# Patient Record
Sex: MALE | Race: BLACK OR AFRICAN AMERICAN | Employment: UNEMPLOYED | URBAN - METROPOLITAN AREA
[De-identification: names, ages, dates, MRNs, and addresses within clinical notes are randomized per-mention and may not be internally consistent; named-entity substitution may affect disease eponyms.]

---

## 2017-04-18 ENCOUNTER — OFFICE VISIT (OUTPATIENT)
Dept: FAMILY MEDICINE CLINIC | Age: 62
End: 2017-04-18

## 2017-04-18 VITALS
HEART RATE: 89 BPM | TEMPERATURE: 97.7 F | DIASTOLIC BLOOD PRESSURE: 84 MMHG | WEIGHT: 268 LBS | BODY MASS INDEX: 39.69 KG/M2 | RESPIRATION RATE: 18 BRPM | HEIGHT: 69 IN | SYSTOLIC BLOOD PRESSURE: 138 MMHG | OXYGEN SATURATION: 98 %

## 2017-04-18 DIAGNOSIS — R93.89 ABNORMAL CHEST X-RAY: Primary | ICD-10-CM

## 2017-04-18 DIAGNOSIS — K21.9 GASTROESOPHAGEAL REFLUX DISEASE WITHOUT ESOPHAGITIS: ICD-10-CM

## 2017-04-18 DIAGNOSIS — I10 ESSENTIAL HYPERTENSION: ICD-10-CM

## 2017-04-18 NOTE — MR AVS SNAPSHOT
Visit Information Date & Time Provider Department Dept. Phone Encounter #  
 4/18/2017  9:30 AM Kun Carr, [address] [facility] [id number] Follow-up Instructions Return if symptoms worsen or fail to improve. Upcoming Health Maintenance Date Due Hepatitis C Screening 1955 DTaP/Tdap/Td series (1 - Tdap) 5/24/1976 FOBT Q 1 YEAR AGE 50-75 5/24/2005 ZOSTER VACCINE AGE 60> 5/24/2015 INFLUENZA AGE 9 TO ADULT 8/1/2016 Allergies as of 4/18/2017  Review Complete On: 4/18/2017 By: Kun Carr MD  
 No Known Allergies Current Immunizations  Never Reviewed No immunizations on file. Not reviewed this visit You Were Diagnosed With   
  
 Codes Comments Abnormal chest x-ray    -  Primary ICD-10-CM: R93.8 ICD-9-CM: 793.2 Essential hypertension     ICD-10-CM: I10 
ICD-9-CM: 401.9 Gastroesophageal reflux disease without esophagitis     ICD-10-CM: K21.9 ICD-9-CM: 530.81 Vitals BP Pulse Temp Resp Height(growth percentile) Weight(growth percentile) 138/84 89 97.7 °F (36.5 °C) (Temporal) 18 5' 9\" (1.753 m) 268 lb (121.6 kg) SpO2 BMI Smoking Status 98% 39.58 kg/m2 Never Smoker Vitals History BMI and BSA Data Body Mass Index Body Surface Area  
 39.58 kg/m 2 2.43 m 2 Preferred Pharmacy Pharmacy Name Phone [facility], [address] [facility] [phone] Your Updated Medication List  
  
   
This list is accurate as of: 4/18/17 10:23 AM.  Always use your most recent med list. amLODIPine 10 mg tablet Commonly known as:  Rice Jus Take  by mouth daily. amoxicillin 500 mg Tab Take  by mouth. celecoxib 200 mg capsule Commonly known as:  CELEBREX Take 1 Cap by mouth two (2) times a day. cetirizine 10 mg tablet Commonly known as:  ZYRTEC Take  by mouth. CINNAMON 500 mg Cap Generic drug:  cinnamon bark Take  by mouth. DULoxetine 30 mg capsule Commonly known as:  CYMBALTA Take 30 mg by mouth daily. finasteride 5 mg tablet Commonly known as:  PROSCAR Take 1 Tab by mouth daily. FISH OIL PO Take  by mouth.  
  
 gabapentin 300 mg capsule Commonly known as:  NEURONTIN Take 300 mg by mouth three (3) times daily. meclizine 25 mg tablet Commonly known as:  ANTIVERT Take 1 Tab by mouth daily. Omeprazole delayed release 20 mg tablet Commonly known as:  PRILOSEC D/R Take 1 Tab by mouth daily. ONE-A-DAY MEN VITACRAVES 200 mcg Chew Generic drug:  multivit with min-folic acid Take  by mouth. raNITIdine 150 mg tablet Commonly known as:  ZANTAC Take 150 mg by mouth two (2) times a day. sertraline 100 mg tablet Commonly known as:  ZOLOFT Take  by mouth daily. sildenafil citrate 100 mg tablet Commonly known as:  VIAGRA Take 1 Tab by mouth as needed. Indications: Erectile Dysfunction  
  
 tamsulosin 0.4 mg capsule Commonly known as:  FLOMAX Take 0.4 mg by mouth daily. telmisartan-hydroCHLOROthiazide 80-12.5 mg per tablet Commonly known as:  MICARDIS HCT Take 1 Tab by mouth daily. traZODone 150 mg tablet Commonly known as:  Jerelene French Take 150 mg by mouth nightly. Follow-up Instructions Return if symptoms worsen or fail to improve. To-Do List   
 04/18/2017 Imaging:  XR CHEST PA LAT Patient Instructions Chest X-Rays: About These Tests What is it? A chest X-ray is a picture of the chest that shows your heart, lungs, airway, blood vessels, and lymph nodes. Chest X-rays can also show the bones of your spine and chest. 
Why is this test done? A chest X-ray is done to find problems with the organs and structures inside the chest. 
How can you prepare for the test? 
· Tell your doctor if you are or might be pregnant.  A chest X-ray is usually not done during pregnancy, but the chance of harm to the fetus is very small. If you need a chest X-ray during pregnancy, you will wear a lead apron to help protect your baby. What happens before the test? 
· Remove any jewelry that might get in the way of the X-ray picture. · You may need to take off all or most of your clothes above the waist. You will be given a gown to wear during the test. 
What happens during the test? 
Two X-ray views of the chest are usually taken. One view is taken from the back. The other view is taken from the side. · You stand with your chest against an X-ray plate for the pictures. · You will need to hold very still while the X-ray is taken. You may be asked to hold your breath for a few seconds. What else should you know about the test? 
· You won't feel any pain from the chest X-ray itself. · If you have pain from a chest problem, you may feel some discomfort if you need to hold a certain position, breathe deep, or hold your breath while the X-ray is done. How long does the test take? · The test will take about 10 minutes. What happens after the test? 
· You will probably be able to go home right away. The results of a chest X-ray are usually available in 1 to 2 days. · You can go back to your usual activities right away. Follow-up care is a key part of your treatment and safety. Be sure to make and go to all appointments, and call your doctor if you are having problems. It's also a good idea to keep a list of the medicines you take. Ask your doctor when you can expect to have your test results. Where can you learn more? Go to http://neel-stone.info/. Enter N777 in the search box to learn more about \"Chest X-Rays: About These Tests. \" Current as of: October 14, 2016 Content Version: 11.2 © 4496-1466 [employer].  Care instructions adapted under license by [employer] (which disclaims liability or warranty for this information). If you have questions about a medical condition or this instruction, always ask your healthcare professional. [address] any warranty or liability for your use of this information. Introducing [facility] SERVICES! [employer] introduces [employer] patient portal. Now you can access parts of your medical record, email your doctor's office, and request medication refills online. 1. In your internet browser, go to [email or web address] 2. Click on the First Time User? Click Here link in the Sign In box. You will see the New Member Sign Up page. 3. Enter your [employer] Access Code exactly as it appears below. You will not need to use this code after youve completed the sign-up process. If you do not sign up before the expiration date, you must request a new code. · Applix Access Code: [email or web address] Expires: 7/17/2017 10:16 AM 
 
4. Enter the last four digits of your Social Security Number (xxxx) and Date of Birth (mm/dd/yyyy) as indicated and click Submit. You will be taken to the next sign-up page. 5. Create a Applix ID. This will be your Applix login ID and cannot be changed, so think of one that is secure and easy to remember. 6. Create a [employer]ix password. You can change your password at any time. 7. Enter your Password Reset Question and Answer. This can be used at a later time if you forget your password. 8. Enter your e-mail address. You will receive e-mail notification when new information is available in [address] 9. Click Sign Up. You can now view and download portions of your medical record. 10. Click the Download Summary menu link to download a portable copy of your medical information. If you have questions, please visit the Frequently Asked Questions section of the [employer] website. Remember, Applix is NOT to be used for urgent needs. For medical emergencies, dial 911. Now available from your iPhone and Android! Please provide this summary of care documentation to your next provider. Your primary care clinician is listed as [id number] [facility]. If you have any questions after today's visit, please call [phone].

## 2017-04-18 NOTE — PROGRESS NOTES
Jake Melendez is a 64 y.o. male  presents for follow up. He was seen at [facility] in [place] and was told that his chest xray was abnormal and it should be repeated. No cough or cold sxs. No SOB fever or chills. No Known Allergies  Outpatient Prescriptions Marked as Taking for the 4/18/17 encounter (Office Visit) with Claire Swenson MD   Medication Sig Dispense Refill    gabapentin (NEURONTIN) 300 mg capsule Take 300 mg by mouth three (3) times daily.  amoxicillin 500 mg tab Take  by mouth.  cinnamon bark (CINNAMON) 500 mg cap Take  by mouth.  tamsulosin (FLOMAX) 0.4 mg capsule Take 0.4 mg by mouth daily.  traZODone (DESYREL) 150 mg tablet Take 150 mg by mouth nightly.  multivit with min-folic acid (ONE-A-DAY MEN VITACRAVES) 200 mcg chew Take  by mouth.  telmisartan-hydroCHLOROthiazide (MICARDIS HCT) 80-12.5 mg per tablet Take 1 Tab by mouth daily. 90 Tab 1    celecoxib (CELEBREX) 200 mg capsule Take 1 Cap by mouth two (2) times a day. 60 Cap 3    finasteride (PROSCAR) 5 mg tablet Take 1 Tab by mouth daily. 90 Tab 1    meclizine (ANTIVERT) 25 mg tablet Take 1 Tab by mouth daily. 90 Tab 1    Omeprazole delayed release (PRILOSEC D/R) 20 mg tablet Take 1 Tab by mouth daily. 90 Tab 1    sildenafil citrate (VIAGRA) 100 mg tablet Take 1 Tab by mouth as needed.  Indications: Erectile Dysfunction 24 Tab 1     Patient Active Problem List   Diagnosis Code    Arthritis M19.90    Essential hypertension I10    Gastroesophageal reflux disease without esophagitis K21.9    PTSD (post-traumatic stress disorder) F43.10    Benign prostatic hyperplasia without lower urinary tract symptoms N40.0    Vertigo R42     Past Medical History:   Diagnosis Date    Arthritis     Headache     Hypercholesterolemia     Hypertension     PTSD (post-traumatic stress disorder)     TBI (traumatic brain injury) ([facility] [address]      Social History     Social History    Marital status:      Spouse name: N/A  Number of children: N/A    Years of education: N/A     Social History Main Topics    Smoking status: Never Smoker    Smokeless tobacco: None    Alcohol use No    Drug use: None    Sexual activity: Not Asked     Other Topics Concern    None     Social History Narrative     No family history on file. Review of Systems   Constitutional: Negative for chills and fever. Respiratory: Negative for cough, shortness of breath and wheezing. Cardiovascular: Negative for chest pain and palpitations. Gastrointestinal: Negative for nausea and vomiting. Neurological: Negative. Vitals:    04/18/17 0954   BP: 138/84   Pulse: 89   Resp: 18   Temp: 97.7 °F (36.5 °C)   TempSrc: Temporal   SpO2: 98%   Weight: 268 lb (121.6 kg)   Height: 5' 9\" (1.753 m)   PainSc:   4   PainLoc: Ankle       Physical Exam   Constitutional: He is well-developed, well-nourished, and in no distress. Neck: Normal range of motion. Neck supple. Cardiovascular: Normal rate, regular rhythm and normal heart sounds. Pulmonary/Chest: Effort normal and breath sounds normal.   Skin: Skin is warm and dry. Nursing note and vitals reviewed. Assessment/Plan      ICD-10-CM ICD-9-CM    1. Abnormal chest x-ray R93.8 793.2 XR CHEST PA LAT   2. Essential hypertension I10 401.9    3. Gastroesophageal reflux disease without esophagitis K21.9 530.81      I have discussed the diagnosis with the patient and the intended plan of care as seen in the above orders. The patient has received an after-visit summary and questions were answered concerning future plans. I have discussed medication, side effects, and warnings with the patient in detail. The patient verbalized understanding and is in agreement with the plan of care. The patient will contact the office with any additional concerns.       Follow-up Disposition:  Return if symptoms worsen or fail to improve.  lab results and schedule of future lab studies reviewed with patient      Carson FORD Cammy Jacome MD

## 2017-04-18 NOTE — PROGRESS NOTES
Patient in the office for chest xray. Exam done and images sent to [facility].

## 2017-04-18 NOTE — PROGRESS NOTES
Pt in office to discuss xrays. 1. Have you been to the ER, urgent care clinic since your last visit? Hospitalized since your last visit? No     2. Have you seen or consulted any other health care providers outside of the [address] [place] [address] since your last visit? Include any pap smears or colon screening.  No

## 2017-04-18 NOTE — PATIENT INSTRUCTIONS
Chest X-Rays: About These Tests  What is it? A chest X-ray is a picture of the chest that shows your heart, lungs, airway, blood vessels, and lymph nodes. Chest X-rays can also show the bones of your spine and chest.  Why is this test done? A chest X-ray is done to find problems with the organs and structures inside the chest.  How can you prepare for the test?  · Tell your doctor if you are or might be pregnant. A chest X-ray is usually not done during pregnancy, but the chance of harm to the fetus is very small. If you need a chest X-ray during pregnancy, you will wear a lead apron to help protect your baby. What happens before the test?  · Remove any jewelry that might get in the way of the X-ray picture. · You may need to take off all or most of your clothes above the waist. You will be given a gown to wear during the test.  What happens during the test?  Two X-ray views of the chest are usually taken. One view is taken from the back. The other view is taken from the side. · You stand with your chest against an X-ray plate for the pictures. · You will need to hold very still while the X-ray is taken. You may be asked to hold your breath for a few seconds. What else should you know about the test?  · You won't feel any pain from the chest X-ray itself. · If you have pain from a chest problem, you may feel some discomfort if you need to hold a certain position, breathe deep, or hold your breath while the X-ray is done. How long does the test take? · The test will take about 10 minutes. What happens after the test?  · You will probably be able to go home right away. The results of a chest X-ray are usually available in 1 to 2 days. · You can go back to your usual activities right away. Follow-up care is a key part of your treatment and safety. Be sure to make and go to all appointments, and call your doctor if you are having problems. It's also a good idea to keep a list of the medicines you take. Ask your doctor when you can expect to have your test results. Where can you learn more? Go to http://neel-stone.info/. Enter I868 in the search box to learn more about \"Chest X-Rays: About These Tests. \"  Current as of: October 14, 2016  Content Version: 11.2  © 3630-0133 [employer]. Care instructions adapted under license by [employer] (which disclaims liability or warranty for this information). If you have questions about a medical condition or this instruction, always ask your healthcare professional. [address] any warranty or liability for your use of this information.

## 2017-06-20 ENCOUNTER — PATIENT OUTREACH (OUTPATIENT)
Dept: FAMILY MEDICINE CLINIC | Age: 62
End: 2017-06-20

## 2017-06-29 ENCOUNTER — OFFICE VISIT (OUTPATIENT)
Dept: FAMILY MEDICINE CLINIC | Age: 62
End: 2017-06-29

## 2017-06-29 ENCOUNTER — HOSPITAL ENCOUNTER (OUTPATIENT)
Dept: LAB | Age: 62
Discharge: HOME | End: 2017-06-29
Payer: MEDICARE

## 2017-06-29 VITALS
TEMPERATURE: 97.8 F | RESPIRATION RATE: 12 BRPM | OXYGEN SATURATION: 97 % | HEIGHT: 69 IN | HEART RATE: 79 BPM | SYSTOLIC BLOOD PRESSURE: 144 MMHG | DIASTOLIC BLOOD PRESSURE: 82 MMHG | WEIGHT: 271 LBS | BODY MASS INDEX: 40.14 KG/M2

## 2017-06-29 DIAGNOSIS — M54.50 CHRONIC BILATERAL LOW BACK PAIN WITHOUT SCIATICA: Primary | ICD-10-CM

## 2017-06-29 DIAGNOSIS — M19.90 ARTHRITIS: ICD-10-CM

## 2017-06-29 DIAGNOSIS — I10 ESSENTIAL HYPERTENSION: ICD-10-CM

## 2017-06-29 DIAGNOSIS — Z11.59 NEED FOR HEPATITIS C SCREENING TEST: ICD-10-CM

## 2017-06-29 DIAGNOSIS — G89.29 CHRONIC BILATERAL LOW BACK PAIN WITHOUT SCIATICA: Primary | ICD-10-CM

## 2017-06-29 LAB
ALBUMIN SERPL BCP-MCNC: 3.7 G/DL (ref 3.4–5)
ALBUMIN/GLOB SERPL: 1 {RATIO} (ref 0.8–1.7)
ALP SERPL-CCNC: 126 U/L (ref 45–117)
ALT SERPL-CCNC: 31 U/L (ref 16–61)
ANION GAP BLD CALC-SCNC: 8 MMOL/L (ref 3–18)
AST SERPL W P-5'-P-CCNC: 26 U/L (ref 15–37)
BILIRUB SERPL-MCNC: 0.4 MG/DL (ref 0.2–1)
BUN SERPL-MCNC: 26 MG/DL (ref 7–18)
BUN/CREAT SERPL: 19 (ref 12–20)
CALCIUM SERPL-MCNC: 9.4 MG/DL (ref 8.5–10.1)
CHLORIDE SERPL-SCNC: 106 MMOL/L (ref 100–108)
CHOLEST SERPL-MCNC: 226 MG/DL
CO2 SERPL-SCNC: 28 MMOL/L (ref 21–32)
CREAT SERPL-MCNC: 1.37 MG/DL (ref 0.6–1.3)
GLOBULIN SER CALC-MCNC: 3.6 G/DL (ref 2–4)
GLUCOSE SERPL-MCNC: 83 MG/DL (ref 74–99)
HDLC SERPL-MCNC: 51 MG/DL (ref 40–60)
HDLC SERPL: 4.4 {RATIO} (ref 0–5)
LDLC SERPL CALC-MCNC: 147.8 MG/DL (ref 0–100)
LIPID PROFILE,FLP: ABNORMAL
POTASSIUM SERPL-SCNC: 4.2 MMOL/L (ref 3.5–5.5)
PROT SERPL-MCNC: 7.3 G/DL (ref 6.4–8.2)
SODIUM SERPL-SCNC: 142 MMOL/L (ref 136–145)
TRIGL SERPL-MCNC: 136 MG/DL (ref ?–150)
VLDLC SERPL CALC-MCNC: 27.2 MG/DL

## 2017-06-29 PROCEDURE — 80061 LIPID PANEL: CPT | Performed by: FAMILY MEDICINE

## 2017-06-29 PROCEDURE — 86803 HEPATITIS C AB TEST: CPT | Performed by: FAMILY MEDICINE

## 2017-06-29 PROCEDURE — 80053 COMPREHEN METABOLIC PANEL: CPT | Performed by: FAMILY MEDICINE

## 2017-06-29 RX ORDER — AMLODIPINE BESYLATE 10 MG/1
10 TABLET ORAL DAILY
Qty: 90 TAB | Refills: 3 | Status: SHIPPED | OUTPATIENT
Start: 2017-06-29

## 2017-06-29 RX ORDER — CELECOXIB 200 MG/1
200 CAPSULE ORAL 2 TIMES DAILY
Qty: 180 CAP | Refills: 0 | Status: SHIPPED | OUTPATIENT
Start: 2017-06-29 | End: 2018-07-09 | Stop reason: SDUPTHER

## 2017-06-29 RX ORDER — DOXYCYCLINE 100 MG/1
100 CAPSULE ORAL DAILY
COMMUNITY

## 2017-06-29 RX ORDER — ONABOTULINUMTOXINA 200 [USP'U]/1
INJECTION, POWDER, LYOPHILIZED, FOR SOLUTION INTRADERMAL; INTRAMUSCULAR
COMMUNITY
Start: 2017-06-06 | End: 2018-10-16

## 2017-06-29 NOTE — MR AVS SNAPSHOT
Visit Information Date & Time Provider Department Dept. Phone Encounter #  
 6/29/2017  8:30 AM Brady Lott MD [address] [id number] Follow-up Instructions Return in about 3 months (around 9/29/2017). Upcoming Health Maintenance Date Due Hepatitis C Screening 1955 FOBT Q 1 YEAR AGE 50-75 5/24/2005 ZOSTER VACCINE AGE 60> 5/24/2015 INFLUENZA AGE 9 TO ADULT 8/1/2017 DTaP/Tdap/Td series (2 - Td) 6/29/2027 Allergies as of 6/29/2017  Review Complete On: 6/29/2017 By: Brady Lott MD  
 No Known Allergies Current Immunizations  Never Reviewed No immunizations on file. Not reviewed this visit You Were Diagnosed With   
  
 Codes Comments Chronic bilateral low back pain without sciatica    -  Primary ICD-10-CM: M54.5, G89.29 ICD-9-CM: 724.2, 338.29 Need for hepatitis C screening test     ICD-10-CM: Z11.59 
ICD-9-CM: V73.89 Arthritis     ICD-10-CM: M19.90 ICD-9-CM: 716.90 Essential hypertension     ICD-10-CM: I10 
ICD-9-CM: 401.9 Vitals BP Pulse Temp Resp Height(growth percentile) Weight(growth percentile) 144/82 (BP 1 Location: Left arm, BP Patient Position: Sitting) 79 97.8 °F (36.6 °C) (Oral) 12 5' 9\" (1.753 m) 271 lb (122.9 kg) SpO2 BMI Smoking Status 97% 40.02 kg/m2 Never Smoker Vitals History BMI and BSA Data Body Mass Index Body Surface Area 40.02 kg/m 2 2.45 m 2 Preferred Pharmacy Pharmacy Name Phone [facility], [address] [facility] [phone] Your Updated Medication List  
  
   
This list is accurate as of: 6/29/17  9:05 AM.  Always use your most recent med list. amLODIPine 10 mg tablet Commonly known as:  Opal Heymann Take 1 Tab by mouth daily. amoxicillin 500 mg Tab Take  by mouth. BOTOX 200 unit injection Generic drug:  onabotulinumtoxinA  
  
 celecoxib 200 mg capsule Commonly known as:  CELEBREX Take 1 Cap by mouth two (2) times a day. cetirizine 10 mg tablet Commonly known as:  ZYRTEC Take  by mouth. CINNAMON 500 mg Cap Generic drug:  cinnamon bark Take  by mouth. doxycycline 100 mg capsule Commonly known as:  Bean Apa Take 100 mg by mouth daily. DULoxetine 30 mg capsule Commonly known as:  CYMBALTA Take 30 mg by mouth daily. finasteride 5 mg tablet Commonly known as:  PROSCAR Take 1 Tab by mouth daily. FISH OIL PO Take  by mouth.  
  
 gabapentin 300 mg capsule Commonly known as:  NEURONTIN Take 300 mg by mouth three (3) times daily. meclizine 25 mg tablet Commonly known as:  ANTIVERT Take 1 Tab by mouth daily. Omeprazole delayed release 20 mg tablet Commonly known as:  PRILOSEC D/R Take 1 Tab by mouth daily. ONE-A-DAY MEN VITACRAVES 200 mcg Chew Generic drug:  multivit with min-folic acid Take  by mouth. raNITIdine 150 mg tablet Commonly known as:  ZANTAC Take 150 mg by mouth two (2) times a day. sertraline 100 mg tablet Commonly known as:  ZOLOFT Take  by mouth daily. sildenafil citrate 100 mg tablet Commonly known as:  VIAGRA Take 1 Tab by mouth as needed. Indications: Erectile Dysfunction  
  
 tamsulosin 0.4 mg capsule Commonly known as:  FLOMAX Take 0.4 mg by mouth daily. telmisartan-hydroCHLOROthiazide 80-12.5 mg per tablet Commonly known as:  MICARDIS HCT Take 1 Tab by mouth daily. traZODone 150 mg tablet Commonly known as:  Randye Rinku Take 150 mg by mouth nightly. Prescriptions Printed Refills  
 amLODIPine (NORVASC) 10 mg tablet 3 Sig: Take 1 Tab by mouth daily. Class: Print Route: Oral  
 celecoxib (CELEBREX) 200 mg capsule 0 Sig: Take 1 Cap by mouth two (2) times a day. Class: Print Route: Oral  
  
Follow-up Instructions Return in about 3 months (around 9/29/2017). To-Do List   
 06/29/2017 Lab:  HEPATITIS C AB   
  
 06/29/2017 Lab:  LIPID PANEL   
  
 06/29/2017 Lab:  METABOLIC PANEL, COMPREHENSIVE   
  
 06/29/2017 Imaging:  XR SPINE LUMB MIN 4 V Introducing [facility]! Ric Perez introduces [employer] patient portal. Now you can access parts of your medical record, email your doctor's office, and request medication refills online. 1. In your internet browser, go to [email or web address] 2. Click on the First Time User? Click Here link in the Sign In box. You will see the New Member Sign Up page. 3. Enter your [employer] Access Code exactly as it appears below. You will not need to use this code after youve completed the sign-up process. If you do not sign up before the expiration date, you must request a new code. · [employer] Access Code: [email or web address] Expires: 7/17/2017 10:16 AM 
 
4. Enter the last four digits of your Social Security Number (xxxx) and Date of Birth (mm/dd/yyyy) as indicated and click Submit. You will be taken to the next sign-up page. 5. Create a [employer] ID. This will be your [employer] login ID and cannot be changed, so think of one that is secure and easy to remember. 6. Create a [employer] password. You can change your password at any time. 7. Enter your Password Reset Question and Answer. This can be used at a later time if you forget your password. 8. Enter your e-mail address. You will receive e-mail notification when new information is available in [address] 9. Click Sign Up. You can now view and download portions of your medical record. 10. Click the Download Summary menu link to download a portable copy of your medical information. If you have questions, please visit the Frequently Asked Questions section of the [employer] website. Remember, [employer] is NOT to be used for urgent needs. For medical emergencies, dial 911. Now available from your iPhone and Android! Please provide this summary of care documentation to your next provider. Your primary care clinician is listed as [phone] [facility]. If you have any questions after today's visit, please call [phone].

## 2017-06-29 NOTE — PROGRESS NOTES
C/o bilateral flank pain and back pain x 1 month that is getting worse patient states he can barely stand up straight. 1. Have you been to the ER, urgent care clinic since your last visit? Hospitalized since your last visit? Yes When: 5/25/17 [facility]: [facility] Reason for visit: Prostate Biopsy    2. Have you seen or consulted any other health care providers outside of the [address] [place] [address] since your last visit? Include any pap smears or colon screening. Yes When: 5/25/17 Where: [facility] Reason for visit: proccedure  Pt asking for refills, HM has been addressed.

## 2017-06-29 NOTE — PROGRESS NOTES
Grover Mendez is a 58 y.o. male  presents for back pain. He has had sxs for months  He has sxs on both sides. He has prostate bx on May 25 2017. He had labs at that time. No Known Allergies  Outpatient Prescriptions Marked as Taking for the 6/29/17 encounter (Office Visit) with Mikhail Vazquez MD   Medication Sig Dispense Refill    BOTOX 200 unit injection       doxycycline (MONODOX) 100 mg capsule Take 100 mg by mouth daily.  gabapentin (NEURONTIN) 300 mg capsule Take 300 mg by mouth three (3) times daily.  cinnamon bark (CINNAMON) 500 mg cap Take  by mouth.  raNITIdine (ZANTAC) 150 mg tablet Take 150 mg by mouth two (2) times a day.  tamsulosin (FLOMAX) 0.4 mg capsule Take 0.4 mg by mouth daily.  traZODone (DESYREL) 150 mg tablet Take 150 mg by mouth nightly.  multivit with min-folic acid (ONE-A-DAY MEN VITACRAVES) 200 mcg chew Take  by mouth.  telmisartan-hydroCHLOROthiazide (MICARDIS HCT) 80-12.5 mg per tablet Take 1 Tab by mouth daily. 90 Tab 1    celecoxib (CELEBREX) 200 mg capsule Take 1 Cap by mouth two (2) times a day. 60 Cap 3    finasteride (PROSCAR) 5 mg tablet Take 1 Tab by mouth daily. 90 Tab 1    meclizine (ANTIVERT) 25 mg tablet Take 1 Tab by mouth daily. 90 Tab 1    Omeprazole delayed release (PRILOSEC D/R) 20 mg tablet Take 1 Tab by mouth daily. 90 Tab 1    sildenafil citrate (VIAGRA) 100 mg tablet Take 1 Tab by mouth as needed.  Indications: Erectile Dysfunction 24 Tab 1     Patient Active Problem List   Diagnosis Code    Arthritis M19.90    Essential hypertension I10    Gastroesophageal reflux disease without esophagitis K21.9    PTSD (post-traumatic stress disorder) F43.10    Benign prostatic hyperplasia without lower urinary tract symptoms N40.0    Vertigo R42     Past Medical History:   Diagnosis Date    Arthritis     Headache     Hypercholesterolemia     Hypertension     PTSD (post-traumatic stress disorder)     TBI (traumatic brain injury[facility])      Social History     Social History    Marital status:      Spouse name: N/A    Number of children: N/A    Years of education: N/A     Social History Main Topics    Smoking status: Never Smoker    Smokeless tobacco: None    Alcohol use No    Drug use: None    Sexual activity: Not Asked     Other Topics Concern    None     Social History Narrative     History reviewed. No pertinent family history. Review of Systems   Constitutional: Negative for chills and fever. Cardiovascular: Negative for chest pain and palpitations. Gastrointestinal: Negative for nausea and vomiting. Genitourinary: Positive for flank pain. Negative for dysuria, frequency, hematuria and urgency. Musculoskeletal: Positive for back pain. Negative for falls, joint pain and myalgias. Psychiatric/Behavioral: Negative. Vitals:    06/29/17 0844   BP: 144/82   Pulse: 79   Resp: 12   Temp: 97.8 °F (36.6 °C)   TempSrc: Oral   SpO2: 97%   Weight: 271 lb (122.9 kg)   Height: 5' 9\" (1.753 m)   PainSc:   3   PainLoc: Flank       Physical Exam   Constitutional: He is oriented to person, place, and time and well-developed, well-nourished, and in no distress. Neck: Normal range of motion. Neck supple. Cardiovascular: Normal rate, regular rhythm and normal heart sounds. Pulmonary/Chest: Effort normal and breath sounds normal.   Musculoskeletal: Normal range of motion. He exhibits no edema or tenderness. Neurological: He is alert and oriented to person, place, and time. Gait normal.   Skin: Skin is warm and dry. Nursing note and vitals reviewed. Assessment/Plan      ICD-10-CM ICD-9-CM    1. Chronic bilateral low back pain without sciatica M54.5 724.2 XR SPINE LUMB MIN 4 V    G89.29 338.29    2. Need for hepatitis C screening test Z11.59 V73.89 BOTOX 200 unit injection      HEPATITIS C AB   3. Arthritis M19.90 716.90 celecoxib (CELEBREX) 200 mg capsule   4.  Essential hypertension I10 401.9 amLODIPine (NORVASC) 10 mg tablet      METABOLIC PANEL, COMPREHENSIVE      LIPID PANEL     I have discussed the diagnosis with the patient and the intended plan of care as seen in the above orders. The patient has received an after-visit summary and questions were answered concerning future plans. I have discussed medication, side effects, and warnings with the patient in detail. The patient verbalized understanding and is in agreement with the plan of care. The patient will contact the office with any additional concerns. Follow-up Disposition:  Return in about 3 months (around 9/29/2017).   lab results and schedule of future lab studies reviewed with patient    Mikhail Vazquez MD

## 2017-06-30 LAB
HCV AB SER IA-ACNC: 0.04 INDEX
HCV AB SERPL QL IA: NEGATIVE
HCV COMMENT,HCGAC: NORMAL

## 2017-07-19 ENCOUNTER — TELEPHONE (OUTPATIENT)
Dept: FAMILY MEDICINE CLINIC | Age: 62
End: 2017-07-19

## 2017-07-19 NOTE — TELEPHONE ENCOUNTER
Spoke to patient to let him know that I would have to call him with results as results were not showing up in the chart. Pt expressed clear understanding and had no further questions.

## 2017-07-19 NOTE — TELEPHONE ENCOUNTER
Patient walked in for results of xray. States he just left physical therapy for his ankle but now his left hip area is bothering him and would like to know the results of this. Nurse aware patient is in waiting room.

## 2017-07-20 ENCOUNTER — PATIENT OUTREACH (OUTPATIENT)
Dept: FAMILY MEDICINE CLINIC | Age: 62
End: 2017-07-20

## 2017-07-20 NOTE — PROGRESS NOTES
NN health screening:    Noted colonoscopy report under media in [facility] this morning. There was no indication for a repeat. I called Lauree Hamman and it is to be repeated in 5 years making it due again in 2020. I have asked the office staff to scan into .

## 2017-07-21 NOTE — PROGRESS NOTES
Called patient had him verify his  he has been made aware of his results and told he will need to repeat his CMP appointment scheduled for lab on 17 at 9:00 AM.

## 2017-07-31 ENCOUNTER — LAB ONLY (OUTPATIENT)
Dept: FAMILY MEDICINE CLINIC | Age: 62
End: 2017-07-31

## 2017-07-31 ENCOUNTER — TELEPHONE (OUTPATIENT)
Dept: FAMILY MEDICINE CLINIC | Age: 62
End: 2017-07-31

## 2017-07-31 ENCOUNTER — HOSPITAL ENCOUNTER (OUTPATIENT)
Dept: LAB | Age: 62
Discharge: HOME | End: 2017-07-31
Payer: MEDICARE

## 2017-07-31 DIAGNOSIS — I10 ESSENTIAL HYPERTENSION: ICD-10-CM

## 2017-07-31 DIAGNOSIS — I10 ESSENTIAL HYPERTENSION: Primary | ICD-10-CM

## 2017-07-31 LAB
ALBUMIN SERPL BCP-MCNC: 3.4 G/DL (ref 3.4–5)
ALBUMIN/GLOB SERPL: 1.1 {RATIO} (ref 0.8–1.7)
ALP SERPL-CCNC: 100 U/L (ref 45–117)
ALT SERPL-CCNC: 38 U/L (ref 16–61)
ANION GAP BLD CALC-SCNC: 10 MMOL/L (ref 3–18)
AST SERPL W P-5'-P-CCNC: 25 U/L (ref 15–37)
BILIRUB SERPL-MCNC: 0.5 MG/DL (ref 0.2–1)
BUN SERPL-MCNC: 24 MG/DL (ref 7–18)
BUN/CREAT SERPL: 17 (ref 12–20)
CALCIUM SERPL-MCNC: 8.9 MG/DL (ref 8.5–10.1)
CHLORIDE SERPL-SCNC: 105 MMOL/L (ref 100–108)
CO2 SERPL-SCNC: 26 MMOL/L (ref 21–32)
CREAT SERPL-MCNC: 1.42 MG/DL (ref 0.6–1.3)
GLOBULIN SER CALC-MCNC: 3.2 G/DL (ref 2–4)
GLUCOSE SERPL-MCNC: 134 MG/DL (ref 74–99)
POTASSIUM SERPL-SCNC: 3.6 MMOL/L (ref 3.5–5.5)
PROT SERPL-MCNC: 6.6 G/DL (ref 6.4–8.2)
SODIUM SERPL-SCNC: 141 MMOL/L (ref 136–145)

## 2017-07-31 PROCEDURE — 80053 COMPREHEN METABOLIC PANEL: CPT | Performed by: FAMILY MEDICINE

## 2017-07-31 NOTE — TELEPHONE ENCOUNTER
Patient is requesting his results of the imaging done in [facility]. He would like a return call following Dr. Gaye Pacheco review.

## 2017-07-31 NOTE — PROGRESS NOTES
Pt came in to have blood drawn. Name/ verified. Venipuncture performed on left hand. Pt tolerated it well.      Frederick Strong LPN

## 2017-08-04 DIAGNOSIS — G89.29 CHRONIC BILATERAL LOW BACK PAIN WITHOUT SCIATICA: Primary | ICD-10-CM

## 2017-08-04 DIAGNOSIS — M47.819 ARTHRITIS OF SPINE: ICD-10-CM

## 2017-08-04 DIAGNOSIS — M54.50 CHRONIC BILATERAL LOW BACK PAIN WITHOUT SCIATICA: Primary | ICD-10-CM

## 2017-09-11 ENCOUNTER — OFFICE VISIT (OUTPATIENT)
Dept: ORTHOPEDIC SURGERY | Age: 62
End: 2017-09-11

## 2017-09-11 VITALS
DIASTOLIC BLOOD PRESSURE: 97 MMHG | RESPIRATION RATE: 18 BRPM | BODY MASS INDEX: 40.61 KG/M2 | HEART RATE: 72 BPM | OXYGEN SATURATION: 97 % | SYSTOLIC BLOOD PRESSURE: 161 MMHG | TEMPERATURE: 97.5 F | WEIGHT: 275 LBS

## 2017-09-11 DIAGNOSIS — M51.36 DDD (DEGENERATIVE DISC DISEASE), LUMBAR: Primary | ICD-10-CM

## 2017-09-11 DIAGNOSIS — M17.0 OSTEOARTHRITIS OF BOTH KNEES, UNSPECIFIED OSTEOARTHRITIS TYPE: ICD-10-CM

## 2017-09-11 DIAGNOSIS — M62.830 MUSCLE SPASM OF BACK: ICD-10-CM

## 2017-09-11 DIAGNOSIS — M47.816 LUMBAR FACET ARTHROPATHY: ICD-10-CM

## 2017-09-11 RX ORDER — DICLOFENAC SODIUM 10 MG/G
GEL TOPICAL 4 TIMES DAILY
Qty: 5 EACH | Refills: 3 | Status: SHIPPED | OUTPATIENT
Start: 2017-09-11 | End: 2018-02-12 | Stop reason: SDUPTHER

## 2017-09-11 NOTE — MR AVS SNAPSHOT
Visit Information Date & Time Provider Department Dept. Phone Encounter #  
 9/11/2017 11:00 AM Irma Charles, 27 [facility] [phone] Follow-up Instructions Return in about 3 weeks (around 10/2/2017) for on Thursday (in 3-4 weeks), Diagnostic Test follow up. Routing History Your Appointments 9/28/2017  8:30 AM  
ROUTINE CARE with Osiris Kaye MD  
[facility] Appt Note: 3 month f/u and MWV welcome   
 [id number] [address] [place] [address]  
[phone]  
  
   
 [address] Upcoming Health Maintenance Date Due ZOSTER VACCINE AGE 60> 3/24/2015 INFLUENZA AGE 9 TO ADULT 8/1/2017 COLONOSCOPY 10/7/2020 DTaP/Tdap/Td series (2 - Td) 6/29/2027 Allergies as of 9/11/2017  Review Complete On: 9/11/2017 By: Irma Charles MD  
 No Known Allergies Current Immunizations  Never Reviewed No immunizations on file. Not reviewed this visit You Were Diagnosed With   
  
 Codes Comments DDD (degenerative disc disease), lumbar    -  Primary ICD-10-CM: M51.36 
ICD-9-CM: 722.52 Lumbar facet arthropathy     ICD-10-CM: M12.88 ICD-9-CM: 721.3 Muscle spasm of back     ICD-10-CM: E79.146 ICD-9-CM: 724.8 Osteoarthritis of both knees, unspecified osteoarthritis type     ICD-10-CM: M17.0 ICD-9-CM: 715.96 Vitals BP Pulse Temp Resp Weight(growth percentile) SpO2  
 (!) 161/97 72 97.5 °F (36.4 °C) 18 275 lb (124.7 kg) 97% BMI Smoking Status 40.61 kg/m2 Never Smoker BMI and BSA Data Body Mass Index Body Surface Area  
 40.61 kg/m 2 2.46 m 2 Preferred Pharmacy Pharmacy Name Phone [facility], [address] [phone] Your Updated Medication List  
  
   
 This list is accurate as of: 9/11/17 12:17 PM.  Always use your most recent med list. amLODIPine 10 mg tablet Commonly known as:  Ama Robin Take 1 Tab by mouth daily. amoxicillin 500 mg Tab Take  by mouth. BOTOX 200 unit injection Generic drug:  onabotulinumtoxinA  
  
 celecoxib 200 mg capsule Commonly known as:  CELEBREX Take 1 Cap by mouth two (2) times a day. cetirizine 10 mg tablet Commonly known as:  ZYRTEC Take  by mouth. CINNAMON 500 mg Cap Generic drug:  cinnamon bark Take  by mouth. diclofenac 1 % Gel Commonly known as:  VOLTAREN Apply  to affected area four (4) times daily. Apply to both knees qid as directed  
  
 doxycycline 100 mg capsule Commonly known as:  Mary Austin Take 100 mg by mouth daily. DULoxetine 30 mg capsule Commonly known as:  CYMBALTA Take 30 mg by mouth daily. finasteride 5 mg tablet Commonly known as:  PROSCAR Take 1 Tab by mouth daily. FISH OIL PO Take  by mouth.  
  
 gabapentin 300 mg capsule Commonly known as:  NEURONTIN Take 300 mg by mouth three (3) times daily. meclizine 25 mg tablet Commonly known as:  ANTIVERT Take 1 Tab by mouth daily. Omeprazole delayed release 20 mg tablet Commonly known as:  PRILOSEC D/R Take 1 Tab by mouth daily. ONE-A-DAY MEN VITACRAVES 200 mcg Chew Generic drug:  multivit with min-folic acid Take  by mouth. raNITIdine 150 mg tablet Commonly known as:  ZANTAC Take 150 mg by mouth two (2) times a day. sertraline 100 mg tablet Commonly known as:  ZOLOFT Take  by mouth daily. sildenafil citrate 100 mg tablet Commonly known as:  VIAGRA Take 1 Tab by mouth as needed. Indications: Erectile Dysfunction  
  
 tamsulosin 0.4 mg capsule Commonly known as:  FLOMAX Take 0.4 mg by mouth daily. telmisartan-hydroCHLOROthiazide 80-12.5 mg per tablet Commonly known as:  MICARDIS HCT  
 Take 1 Tab by mouth daily. traZODone 150 mg tablet Commonly known as:  Laura Torres Take 150 mg by mouth nightly. Prescriptions Printed Refills  
 diclofenac (VOLTAREN) 1 % gel 3 Sig: Apply  to affected area four (4) times daily. Apply to both knees qid as directed Class: Print Route: Topical  
  
Follow-up Instructions Return in about 3 weeks (around 10/2/2017) for on Thursday (in 3-4 weeks), Diagnostic Test follow up. To-Do List   
 09/18/2017 Imaging:  MRI LUMB SPINE WO CONT Patient Instructions Low Back Arthritis: Exercises Your Care Instructions Here are some examples of typical rehabilitation exercises for your condition. Start each exercise slowly. Ease off the exercise if you start to have pain. Your doctor or physical therapist will tell you when you can start these exercises and which ones will work best for you. When you are not being active, find a comfortable position for rest. Some people are comfortable on the floor or a medium-firm bed with a small pillow under their head and another under their knees. Some people prefer to lie on their side with a pillow between their knees. Don't stay in one position for too long. Take short walks (10 to 20 minutes) every 2 to 3 hours. Avoid slopes, hills, and stairs until you feel better. Walk only distances you can manage without pain, especially leg pain. How to do the exercises Pelvic tilt 1. Lie on your back with your knees bent. 2. \"Brace\" your stomachtighten your muscles by pulling in and imagining your belly button moving toward your spine. 3. Press your lower back into the floor. You should feel your hips and pelvis rock back. 4. Hold for 6 seconds while breathing smoothly. 5. Relax and allow your pelvis and hips to rock forward. 6. Repeat 8 to 12 times. Back stretches 1. Get down on your hands and knees on the floor. 2. Relax your head and allow it to droop. Round your back up toward the ceiling until you feel a nice stretch in your upper, middle, and lower back. Hold this stretch for as long as it feels comfortable, or about 15 to 30 seconds. 3. Return to the starting position with a flat back while you are on your hands and knees. 4. Let your back sway by pressing your stomach toward the floor. Lift your buttocks toward the ceiling. 5. Hold this position for 15 to 30 seconds. 6. Repeat 2 to 4 times. Follow-up care is a key part of your treatment and safety. Be sure to make and go to all appointments, and call your doctor if you are having problems. It's also a good idea to know your test results and keep a list of the medicines you take. Where can you learn more? Go to http://neel-stone.info/. Enter E717 in the search box to learn more about \"Low Back Arthritis: Exercises. \" Current as of: March 21, 2017 Content Version: 11.3 © 3310-7155 Awesome.me. Care instructions adapted under license by [employer] (which disclaims liability or warranty for this information). If you have questions about a medical condition or this instruction, always ask your healthcare professional. [address] any warranty or liability for your use of this information. Introducing [facility]! Celina Laboy introduces SwingPal patient portal. Now you can access parts of your medical record, email your doctor's office, and request medication refills online. 1. In your internet browser, go to [email or web address] 2. Click on the First Time User? Click Here link in the Sign In box. You will see the New Member Sign Up page. 3. Enter your SwingPal Access Code exactly as it appears below. You will not need to use this code after youve completed the sign-up process. If you do not sign up before the expiration date, you must request a new code. · [email or web address] Access Code: [email or web address] Expires: 10/29/2017  9:26 AM 
 
4. Enter the last four digits of your Social Security Number (xxxx) and Date of Birth (mm/dd/yyyy) as indicated and click Submit. You will be taken to the next sign-up page. 5. Create a AHAlife.com ID. This will be your [email or web address] login ID and cannot be changed, so think of one that is secure and easy to remember. 6. Create a [email or web address].com password. You can change your password at any time. 7. Enter your Password Reset Question and Answer. This can be used at a later time if you forget your password. 8. Enter your e-mail address. You will receive e-mail notification when new information is available in [address] 9. Click Sign Up. You can now view and download portions of your medical record. 10. Click the Download Summary menu link to download a portable copy of your medical information. If you have questions, please visit the Frequently Asked Questions section of the [email or web address] website. Remember, [email or web address] is NOT to be used for urgent needs. For medical emergencies, dial 911. Now available from your iPhone and Android! Please provide this summary of care documentation to your next provider. Your primary care clinician is listed as [address]. If you have any questions after today's visit, please call [phone].

## 2017-09-11 NOTE — PROGRESS NOTES
[facility]  [address] [place], [address]  Phone: [phone]  Fax: [phone]    NEW PATIENT    ASSESSMENT   Diagnoses and all orders for this visit:    1. DDD (degenerative disc disease), lumbar  -     MRI LUMB SPINE WO CONT; Future    2. Lumbar facet arthropathy  -     MRI LUMB SPINE WO CONT; Future    3. Muscle spasm of back  -     MRI LUMB SPINE WO CONT; Future    4. Osteoarthritis of both knees, unspecified osteoarthritis type  -     diclofenac (VOLTAREN) 1 % gel; Apply  to affected area four (4) times daily. Apply to both knees qid as directed         IMPRESSION AND PLAN:  Sheila Lovelace is a 58 y.o. male with history of lumbar pain x 20+ years. Pt c/o across the lower back that has recently progressed but denies any pain radiating down the legs at this time. Of note, Pt was a paratrooper and also c/o knee, ankle, and shoulder pain. 1) Pt was given information on lumbar arthritis exercises. 2) A lumbar MRI was ordered. Pt has progressive lower back pain with difficulty standing and walking. 3) I recommended the Pt try water exercise. 4) He was prescribed Voltaren 1% gel. 5) Mr. Gilbert Celis has a reminder for a \"due or due soon\" health maintenance. I have asked that he contact his primary care provider, Elva Bailey MD, for follow-up on this health maintenance. 6)  demonstrated consistency with prescribing. 7) Pt will follow-up in 3-4 weeks. HISTORY OF PRESENT ILLNESS:  Sheila Lovelace is a 58 y.o. male with history of lumbar pain x 20+ years. He presents to the office today as a new patient referred by Dr. Cami Barth. Pt c/o across the lower back but denies any pain radiating down the legs at this time. He reports that his pain has recently progressed but he denies any falls or inciting injuries. Pt's pain is worse when getting out of bed in the morning.  He has difficulty extending his back once getting out of bed but this improves after a hot shower and throughout the day. Pt reports minimal pain when sitting but experiences pain when transitioning from sit to stand. He denies any loss of bowel or bladder control. Pt has not tried physical therapy. He takes Celebrex 200 mg 1 tab daily for arthritis in both hands with minimal relief. Pt experiences no relief with naproxen or ibuprofen 800 mg. He has never tried diclofenac. Pt at this time desires to proceed with a lumbar MRI and medication evaluation. Pt is a  and goes to the [employer] and occasionally goes to [employer]. Pt was a paratrooper and also c/o knee, ankle, and shoulder pain. Of note, Pt is not a smoker. He denies any prior spinal surgery or knee surgery. Pain Scale: 6/10     PCP: Indra Lew MD      Past Medical History:   Diagnosis Date    Arthritis     Headache     Hypercholesterolemia     Hypertension     PTSD (post-traumatic stress disorder)     TBI (traumatic brain injury) ([facility] [address]         Social History     Social History    Marital status:      Spouse name: N/A    Number of children: N/A    Years of education: N/A     Occupational History    Not on file. Social History Main Topics    Smoking status: Never Smoker    Smokeless tobacco: Not on file    Alcohol use No    Drug use: Not on file    Sexual activity: Not on file     Other Topics Concern    Not on file     Social History Narrative       Current Outpatient Prescriptions   Medication Sig Dispense Refill    diclofenac (VOLTAREN) 1 % gel Apply  to affected area four (4) times daily. Apply to both knees qid as directed 5 Each 3    BOTOX 200 unit injection       doxycycline (MONODOX) 100 mg capsule Take 100 mg by mouth daily.  amLODIPine (NORVASC) 10 mg tablet Take 1 Tab by mouth daily. 90 Tab 3    celecoxib (CELEBREX) 200 mg capsule Take 1 Cap by mouth two (2) times a day.  180 Cap 0    gabapentin (NEURONTIN) 300 mg capsule Take 300 mg by mouth three (3) times daily.  amoxicillin 500 mg tab Take  by mouth.  cinnamon bark (CINNAMON) 500 mg cap Take  by mouth.  DULoxetine (CYMBALTA) 30 mg capsule Take 30 mg by mouth daily.  raNITIdine (ZANTAC) 150 mg tablet Take 150 mg by mouth two (2) times a day.  sertraline (ZOLOFT) 100 mg tablet Take  by mouth daily.  tamsulosin (FLOMAX) 0.4 mg capsule Take 0.4 mg by mouth daily.  traZODone (DESYREL) 150 mg tablet Take 150 mg by mouth nightly.  multivit with min-folic acid (ONE-A-DAY MEN VITACRAVES) 200 mcg chew Take  by mouth.  telmisartan-hydroCHLOROthiazide (MICARDIS HCT) 80-12.5 mg per tablet Take 1 Tab by mouth daily. 90 Tab 1    finasteride (PROSCAR) 5 mg tablet Take 1 Tab by mouth daily. 90 Tab 1    meclizine (ANTIVERT) 25 mg tablet Take 1 Tab by mouth daily. 90 Tab 1    Omeprazole delayed release (PRILOSEC D/R) 20 mg tablet Take 1 Tab by mouth daily. 90 Tab 1    sildenafil citrate (VIAGRA) 100 mg tablet Take 1 Tab by mouth as needed. Indications: Erectile Dysfunction 24 Tab 1    cetirizine (ZYRTEC) 10 mg tablet Take  by mouth.  DOCOSAHEXANOIC ACID/EPA (FISH OIL PO) Take  by mouth. No Known Allergies    REVIEW OF SYSTEMS    Constitutional: Negative for fever, chills, or weight change. Respiratory: Negative for cough or shortness of breath. Cardiovascular: Negative for chest pain or palpitations. Gastrointestinal: Negative for acid reflux, change in bowel habits, or constipation. Genitourinary: Negative for dysuria and flank pain. Musculoskeletal: Positive for lumbar pain. Skin: Negative for rash. Neurological: Negative for headaches, dizziness, or numbness. Endo/Heme/Allergies: Negative for increased bruising. Psychiatric/Behavioral: Negative for difficulty with sleep.     PHYSICAL EXAMINATION  Visit Vitals    BP (!) 161/97    Pulse 72    Temp 97.5 °F (36.4 °C)    Resp 18    Wt 275 lb (124.7 kg)    SpO2 97%    BMI 40.61 kg/m2 Constitutional: Awake, alert, and in no acute distress  HEENT: Normocephalic. Atraumatic. Oropharynx is moist and clear. PERRL. EOMI. Sclerae are nonicteric  Heart: Regular rate and rhythm  Lungs: Clear to auscultation bilaterally  Abdomen: Soft and nontender. Bowel sounds are present  Neurological: 1+ symmetrical DTRs in the upper extremities. 1+ symmetrical DTRs in the lower extremities. Sensation to light touch is intact. Negative Marbella's sign bilaterally. Skin: warm, dry, and intact. Musculoskeletal: Good ROM in the cervical spine on all planes. Moderate pain with extension, lateral bending, and axial loading. Better with forward flexion. Minimal tenderness to palpation in the lower lumbar region. Moderate pain with internal rotation of his right hip. Negative straight leg raise bilaterally. Pt ambulates with the assistance of a single point cane. Biceps  Triceps Deltoids Wrist Ext Wrist Flex Hand Intrin   Right +4/5 +4/5 +4/5 +4/5 +4/5 +4/5   Left +4/5 +4/5 +4/5 +4/5 +4/5 +4/5      Hip Flex  Quads Hamstrings Ankle DF EHL Ankle PF   Right +4/5 +4/5 +4/5 +4/5 +4/5 +4/5   Left +4/5 +4/5 +4/5 +4/5 +4/5 +4/5     IMAGING:    Lumbar spine 4V x-rays from 06/30/2017 was personally reviewed with the patient and demonstrated:    Results from Appointment encounter on 06/30/17   XR SPINE LUMB MIN 4 V   Narrative Lumbar spine, 5 views:        INDICATION:    Chronic bilateral low back pain with sciatica. COMPARISON STUDY: None. FINDINGS:    No evidence of lumbar scoliosis or hyperlordosis. Lumbar vertebral bodies are of normal heights and in normal alignment. There is no evidence of lumbar spondylolysis or spondylolisthesis. At L5-S1 level there are findings of mild degenerative disc disease with mild  narrowing of the space and mild spurring from and place. In the facet joints  there are mild-to-moderate facet osteoarthritic changes present.     At L4/L5 level there are findings of mild to moderate degenerative disc disease  with mild to moderate narrowing of the space and mild osteophytosis from  vertebral endplates. At the facet joints there are moderate osteoarthritic  changes demonstrated. In rest of lumbar spine there is no other diagnostic finding. IMPRESSIONS:        At L4/L5 and L5 and S1 levels of lumbar spine, there are findings of spondylosis  with mild-to-moderate degenerative disc disease and facet osteoarthritis, as  described. Written by Sam Meadows, as dictated by Bam Martínez MD.  I, Dr. Bam Martínez confirm that all documentation is accurate.

## 2017-09-11 NOTE — PATIENT INSTRUCTIONS

## 2017-09-14 ENCOUNTER — HOSPITAL ENCOUNTER (OUTPATIENT)
Age: 62
Discharge: HOME | End: 2017-09-14
Attending: PHYSICAL MEDICINE & REHABILITATION
Payer: MEDICARE

## 2017-09-14 DIAGNOSIS — M51.36 DDD (DEGENERATIVE DISC DISEASE), LUMBAR: ICD-10-CM

## 2017-09-14 DIAGNOSIS — M62.830 MUSCLE SPASM OF BACK: ICD-10-CM

## 2017-09-14 DIAGNOSIS — M47.816 LUMBAR FACET ARTHROPATHY: ICD-10-CM

## 2017-09-14 PROCEDURE — 72148 MRI LUMBAR SPINE W/O DYE: CPT

## 2017-09-28 ENCOUNTER — OFFICE VISIT (OUTPATIENT)
Dept: FAMILY MEDICINE CLINIC | Age: 62
End: 2017-09-28

## 2017-09-28 VITALS
BODY MASS INDEX: 40.73 KG/M2 | WEIGHT: 275 LBS | OXYGEN SATURATION: 99 % | DIASTOLIC BLOOD PRESSURE: 82 MMHG | HEART RATE: 78 BPM | TEMPERATURE: 98.3 F | RESPIRATION RATE: 16 BRPM | HEIGHT: 69 IN | SYSTOLIC BLOOD PRESSURE: 128 MMHG

## 2017-09-28 DIAGNOSIS — Z23 ENCOUNTER FOR IMMUNIZATION: ICD-10-CM

## 2017-09-28 DIAGNOSIS — I10 ESSENTIAL HYPERTENSION: ICD-10-CM

## 2017-09-28 DIAGNOSIS — Z71.89 ACP (ADVANCE CARE PLANNING): ICD-10-CM

## 2017-09-28 DIAGNOSIS — Z00.00 WELCOME TO MEDICARE PREVENTIVE VISIT: Primary | ICD-10-CM

## 2017-09-28 NOTE — PROGRESS NOTES
Chief Complaint   Patient presents with    Welcome To Medicare         This is a \"Welcome to [employer]"  Initial Preventive Physical Examination (IPPE) providing Personalized Prevention Plan Services (Performed in the first 12 months of enrollment)    I have reviewed the patient's medical history in detail and updated the computerized patient record. History     Past Medical History:   Diagnosis Date    Arthritis     Headache     Hypercholesterolemia     Hypertension     PTSD (post-traumatic stress disorder)     TBI (traumatic brain injury) ([facility] [address]       Past Surgical History:   Procedure Laterality Date    HX ORTHOPAEDIC      HX OTHER SURGICAL  02/02/2017    tendon surgery      Current Outpatient Prescriptions   Medication Sig Dispense Refill    diclofenac (VOLTAREN) 1 % gel Apply  to affected area four (4) times daily. Apply to both knees qid as directed 5 Each 3    BOTOX 200 unit injection       doxycycline (MONODOX) 100 mg capsule Take 100 mg by mouth daily.  amLODIPine (NORVASC) 10 mg tablet Take 1 Tab by mouth daily. 90 Tab 3    celecoxib (CELEBREX) 200 mg capsule Take 1 Cap by mouth two (2) times a day. 180 Cap 0    gabapentin (NEURONTIN) 300 mg capsule Take 300 mg by mouth three (3) times daily.  amoxicillin 500 mg tab Take  by mouth.  cinnamon bark (CINNAMON) 500 mg cap Take  by mouth.  DULoxetine (CYMBALTA) 30 mg capsule Take 30 mg by mouth daily.  raNITIdine (ZANTAC) 150 mg tablet Take 150 mg by mouth two (2) times a day.  sertraline (ZOLOFT) 100 mg tablet Take  by mouth daily.  traZODone (DESYREL) 150 mg tablet Take 150 mg by mouth nightly.  multivit with min-folic acid (ONE-A-DAY MEN VITACRAVES) 200 mcg chew Take  by mouth.  telmisartan-hydroCHLOROthiazide (MICARDIS HCT) 80-12.5 mg per tablet Take 1 Tab by mouth daily. 90 Tab 1    finasteride (PROSCAR) 5 mg tablet Take 1 Tab by mouth daily.  90 Tab 1    meclizine (ANTIVERT) 25 mg tablet Take 1 Tab by mouth daily. 90 Tab 1    Omeprazole delayed release (PRILOSEC D/R) 20 mg tablet Take 1 Tab by mouth daily. 90 Tab 1    sildenafil citrate (VIAGRA) 100 mg tablet Take 1 Tab by mouth as needed. Indications: Erectile Dysfunction 24 Tab 1    cetirizine (ZYRTEC) 10 mg tablet Take  by mouth.  tamsulosin (FLOMAX) 0.4 mg capsule Take 0.4 mg by mouth daily.  DOCOSAHEXANOIC ACID/EPA (FISH OIL PO) Take  by mouth. No Known Allergies  No family history on file. Social History   Substance Use Topics    Smoking status: Never Smoker    Smokeless tobacco: Not on file    Alcohol use No     Diet, Lifestyle:  Pt avoids drinking sodas and eating fried foods    Exercise level: Just joined the [facility]    Depression Risk Screen     PHQ over the last two weeks 4/18/2017   PHQ Not Done Active Diagnosis of Depression or Bipolar Disorder     Alcohol Risk Screen   You do not drink alcohol or very rarely. Functional Ability and Level of Safety   Hearing LossHearing is good. Vision Screening  Vision is good. No exam data present      Activities of Daily Living  The home contains: no safety equipment  Patient does total self care    Fall Risk Screen  Fall Risk Assessment, last 12 mths 9/11/2017   Able to walk? Yes   Fall in past 12 months? No       Abuse Screen  Patient is not abused    Screening EKG   EKG order placed: Yes    Patient Care Team   Patient Care Team:  Micki Christine MD as PCP - General (Family Practice)     End of Life Planning   Advanced care planning directives were discussed with the patient and /or family/caregiver. Assessment/Plan   Education and counseling provided:  Are appropriate based on today's review and evaluation    Diagnoses and all orders for this visit:    1.  Welcome to Medicare preventive visit  -     AMB POC EKG ROUTINE W/ 12 LEADS, INTER & REP    2. Encounter for immunization  -     varicella zoster vacine live (ZOSTAVAX) 19,400 unit/0.65 mL susr injection; 1 Vial by SubCUTAneous route once for 1 dose. 3. Essential hypertension    4.  ACP (advance care planning)      Health Maintenance Due   Topic Date Due    ZOSTER VACCINE AGE 60>  03/24/2015    INFLUENZA AGE 9 TO ADULT  08/01/2017     Any Pressley MD

## 2017-09-28 NOTE — ACP (ADVANCE CARE PLANNING)
Advance Care Planning (ACP) Provider Conversation Snapshot    Date of ACP Conversation: 09/28/17  Persons included in Conversation:  patient  Length of ACP Conversation in minutes:  16 minutes    Authorized Decision Maker (if patient is incapable of making informed decisions):    This person is:   Healthcare Agent/Medical Power of  under Advance Directive          For Patients with Decision Making Capacity:   Values/Goals: Exploration of values, goals, and preferences if recovery is not expected, even with continued medical treatment in the event of:  Imminent death  Severe, permanent brain injury    Conversation Outcomes / Follow-Up Plan:   Recommended completion of Advance Directive form after review of ACP materials and conversation with prospective healthcare agent

## 2017-09-28 NOTE — PATIENT INSTRUCTIONS
Medicare Wellness Visit, Male    The best way to live healthy is to have a healthy lifestyle by eating a well-balanced diet, exercising regularly, limiting alcohol and stopping smoking. Regular physical exams and screening tests are another way to keep healthy. Preventive exams provided by your health care provider can find health problems before they become diseases or illnesses. Preventive services including immunizations, screening tests, monitoring and exams can help you take care of your own health. All people over age 72 should have a pneumovax  and and a prevnar shot to prevent pneumonia. These are once in a lifetime unless you and your provider decide differently. All people over 65 should have a yearly flu shot and a tetanus vaccine every 10 years. Screening for diabetes mellitus with a blood sugar test should be done every year. Glaucoma is a disease of the eye due to increased ocular pressure that can lead to blindness and it should be done every year by an eye professional.    Cardiovascular screening tests that check for elevated lipids (fatty part of blood) which can lead to heart disease and strokes should be done every 5 years. Colorectal screening that evaluates for blood or polyps in your colon should be done yearly as a stool test or every five years as a flexible sigmoidoscope or every 10 years as a colonoscopy up to age 76. Men up to age 76 may need a screening blood test for prostate cancer at certain intervals, depending on their personal and family history. This decision is between the patient and his provider. If you have been a smoker or had family history of abdominal aortic aneurysms, you and your provider may decide to schedule an ultrasound test of your aorta. Hepatitis C screening is also recommended for anyone born between [birth year] through [address]. A shingles vaccine is also recommended once in a lifetime after age 61.     Your Medicare Wellness Exam is recommended annually. Here is a list of your current Health Maintenance items with a due date:  Health Maintenance Due   Topic Date Due    Shingles Vaccine  03/24/2015    Flu Vaccine  08/01/2017          Advance Directives: Care Instructions  Your Care Instructions  An advance directive is a legal way to state your wishes at the end of your life. It tells your family and your doctor what to do if you can no longer say what you want. There are two main types of advance directives. You can change them any time that your wishes change. · A living will tells your family and your doctor your wishes about life support and other treatment. · A durable power of  for health care lets you name a person to make treatment decisions for you when you can't speak for yourself. This person is called a health care agent. If you do not have an advance directive, decisions about your medical care may be made by a doctor or a  who doesn't know you. It may help to think of an advance directive as a gift to the people who care for you. If you have one, they won't have to make tough decisions by themselves. Follow-up care is a key part of your treatment and safety. Be sure to make and go to all appointments, and call your doctor if you are having problems. It's also a good idea to know your test results and keep a list of the medicines you take. How can you care for yourself at home? · Discuss your wishes with your loved ones and your doctor. This way, there are no surprises. · Many states have a unique form. Or you might use a universal form that has been approved by many states. This kind of form can sometimes be completed and stored online. Your electronic copy will then be available wherever you have a connection to the Internet. In most cases, doctors will respect your wishes even if you have a form from a different state. · You don't need a  to do an advance directive.  But you may want to get legal advice. · Think about these questions when you prepare an advance directive:  ¨ Who do you want to make decisions about your medical care if you are not able to? Many people choose a family member or close friend. ¨ Do you know enough about life support methods that might be used? If not, talk to your doctor so you understand. ¨ What are you most afraid of that might happen? You might be afraid of having pain, losing your independence, or being kept alive by machines. ¨ Where would you prefer to die? Choices include your home, a hospital, or a nursing home. ¨ Would you like to have information about hospice care to support you and your family? ¨ Do you want to donate organs when you die? ¨ Do you want certain Hinduism practices performed before you die? If so, put your wishes in the advance directive. · Read your advance directive every year, and make changes as needed. When should you call for help? Be sure to contact your doctor if you have any questions. Where can you learn more? Go to http://neel-stone.info/. Enter R264 in the search box to learn more about \"Advance Directives: Care Instructions. \"  Current as of: November 17, 2016  Content Version: 11.3  © 8689-0776 [employer]. Care instructions adapted under license by "[employer] (which disclaims liability or warranty for this information). If you have questions about a medical condition or this instruction, always ask your healthcare professional. [place] [address] any warranty or liability for your use of this information.

## 2017-09-28 NOTE — MR AVS SNAPSHOT
Visit Information Date & Time Provider Department Dept. Phone Encounter #  
 9/28/2017  8:30 AM Sarbjit Mcwilliams MD [id number] Follow-up Instructions Return in about 3 months (around 12/28/2017). Your Appointments 10/5/2017  1:45 PM  
DIAG TEST F/U with Taj Orlando MD  
[facility] - [facility] Appt Note: MRI F/U BRING DISC/PT AWR OF [facility] LOC  
 [address] [facility] [address] [facility] [id number]  
[address] [facility] Upcoming Health Maintenance Date Due ZOSTER VACCINE AGE 60> 3/24/2015 INFLUENZA AGE 9 TO ADULT 8/1/2017 COLONOSCOPY 10/7/2020 DTaP/Tdap/Td series (2 - Td) 6/29/2027 Allergies as of 9/28/2017  Review Complete On: 9/28/2017 By: Sarbjit Mcwilliams MD  
 No Known Allergies Current Immunizations  Never Reviewed No immunizations on file. Not reviewed this visit You Were Diagnosed With   
  
 Codes Comments Welcome to Medicare preventive visit    -  Primary ICD-10-CM: Z00.00 ICD-9-CM: V70.0 Encounter for immunization     ICD-10-CM: R81 ICD-9-CM: V03.89 Essential hypertension     ICD-10-CM: I10 
ICD-9-CM: 401.9 ACP (advance care planning)     ICD-10-CM: Z71.89 ICD-9-CM: V65.49 Vitals BP Pulse Temp Resp Height(growth percentile) Weight(growth percentile) 128/82 (BP 1 Location: Left arm, BP Patient Position: Sitting) 78 98.3 °F (36.8 °C) 16 5' 9\" (1.753 m) 275 lb (124.7 kg) SpO2 BMI Smoking Status 99% 40.61 kg/m2 Never Smoker Vitals History BMI and BSA Data Body Mass Index Body Surface Area  
 40.61 kg/m 2 2.46 m 2 Preferred Pharmacy Pharmacy Name Phone [facility] [address] [phone] Your Updated Medication List  
  
   
This list is accurate as of: 9/28/17  8:58 AM.  Always use your most recent med list. amLODIPine 10 mg tablet Commonly known as:  Harlon Doyne Take 1 Tab by mouth daily. amoxicillin 500 mg Tab Take  by mouth. BOTOX 200 unit injection Generic drug:  onabotulinumtoxinA  
  
 celecoxib 200 mg capsule Commonly known as:  CELEBREX Take 1 Cap by mouth two (2) times a day. cetirizine 10 mg tablet Commonly known as:  ZYRTEC Take  by mouth. CINNAMON 500 mg Cap Generic drug:  cinnamon bark Take  by mouth. diclofenac 1 % Gel Commonly known as:  VOLTAREN Apply  to affected area four (4) times daily. Apply to both knees qid as directed  
  
 doxycycline 100 mg capsule Commonly known as:  Cassel Nik Take 100 mg by mouth daily. DULoxetine 30 mg capsule Commonly known as:  CYMBALTA Take 30 mg by mouth daily. finasteride 5 mg tablet Commonly known as:  PROSCAR Take 1 Tab by mouth daily. FISH OIL PO Take  by mouth.  
  
 gabapentin 300 mg capsule Commonly known as:  NEURONTIN Take 300 mg by mouth three (3) times daily. meclizine 25 mg tablet Commonly known as:  ANTIVERT Take 1 Tab by mouth daily. Omeprazole delayed release 20 mg tablet Commonly known as:  PRILOSEC D/R Take 1 Tab by mouth daily. ONE-A-DAY MEN VITACRAVES 200 mcg Chew Generic drug:  multivit with min-folic acid Take  by mouth. raNITIdine 150 mg tablet Commonly known as:  ZANTAC Take 150 mg by mouth two (2) times a day. sertraline 100 mg tablet Commonly known as:  ZOLOFT Take  by mouth daily. sildenafil citrate 100 mg tablet Commonly known as:  VIAGRA Take 1 Tab by mouth as needed. Indications: Erectile Dysfunction  
  
 tamsulosin 0.4 mg capsule Commonly known as:  FLOMAX Take 0.4 mg by mouth daily. telmisartan-hydroCHLOROthiazide 80-12.5 mg per tablet Commonly known as:  MICARDIS HCT Take 1 Tab by mouth daily. traZODone 150 mg tablet Commonly known as:  Parry Spatz Take 150 mg by mouth nightly. varicella zoster vacine live 19,400 unit/0.65 mL Susr injection Commonly known as:  ZOSTAVAX  
1 Vial by SubCUTAneous route once for 1 dose. Prescriptions Printed Refills  
 varicella zoster vacine live (ZOSTAVAX) 19,400 unit/0.65 mL susr injection 0 Si Vial by SubCUTAneous route once for 1 dose. Class: Print Route: SubCUTAneous We Performed the Following AMB POC EKG ROUTINE  LEADS, INTER & REP [47968 CPT(R)] Follow-up Instructions Return in about 3 months (around 2017). Patient Instructions Medicare Wellness Visit, Male The best way to live healthy is to have a healthy lifestyle by eating a well-balanced diet, exercising regularly, limiting alcohol and stopping smoking. Regular physical exams and screening tests are another way to keep healthy. Preventive exams provided by your health care provider can find health problems before they become diseases or illnesses. Preventive services including immunizations, screening tests, monitoring and exams can help you take care of your own health. All people over age 72 should have a pneumovax  and and a prevnar shot to prevent pneumonia. These are once in a lifetime unless you and your provider decide differently. All people over 65 should have a yearly flu shot and a tetanus vaccine every 10 years. Screening for diabetes mellitus with a blood sugar test should be done every year. Glaucoma is a disease of the eye due to increased ocular pressure that can lead to blindness and it should be done every year by an eye professional. 
 
Cardiovascular screening tests that check for elevated lipids (fatty part of blood) which can lead to heart disease and strokes should be done every 5 years.  
 
Colorectal screening that evaluates for blood or polyps in your colon should be done yearly as a stool test or every five years as a flexible sigmoidoscope or every 10 years as a colonoscopy up to age 76. Men up to age 76 may need a screening blood test for prostate cancer at certain intervals, depending on their personal and family history. This decision is between the patient and his provider. If you have been a smoker or had family history of abdominal aortic aneurysms, you and your provider may decide to schedule an ultrasound test of your aorta. Hepatitis C screening is also recommended for anyone born between [birth year] through [address]. A shingles vaccine is also recommended once in a lifetime after age 61. Your Medicare Wellness Exam is recommended annually. Here is a list of your current Health Maintenance items with a due date: 
Health Maintenance Due Topic Date Due  Shingles Vaccine  03/24/2015  Flu Vaccine  08/01/2017 Advance Directives: Care Instructions Your Care Instructions An advance directive is a legal way to state your wishes at the end of your life. It tells your family and your doctor what to do if you can no longer say what you want. There are two main types of advance directives. You can change them any time that your wishes change. · A living will tells your family and your doctor your wishes about life support and other treatment. · A durable power of  for health care lets you name a person to make treatment decisions for you when you can't speak for yourself. This person is called a health care agent. If you do not have an advance directive, decisions about your medical care may be made by a doctor or a  who doesn't know you. It may help to think of an advance directive as a gift to the people who care for you. If you have one, they won't have to make tough decisions by themselves. Follow-up care is a key part of your treatment and safety.  Be sure to make and go to all appointments, and call your doctor if you are having problems. It's also a good idea to know your test results and keep a list of the medicines you take. How can you care for yourself at home? · Discuss your wishes with your loved ones and your doctor. This way, there are no surprises. · Many states have a unique form. Or you might use a universal form that has been approved by many states. This kind of form can sometimes be completed and stored online. Your electronic copy will then be available wherever you have a connection to the Internet. In most cases, doctors will respect your wishes even if you have a form from a different state. · You don't need a  to do an advance directive. But you may want to get legal advice. · Think about these questions when you prepare an advance directive: ¨ Who do you want to make decisions about your medical care if you are not able to? Many people choose a family member or close friend. ¨ Do you know enough about life support methods that might be used? If not, talk to your doctor so you understand. ¨ What are you most afraid of that might happen? You might be afraid of having pain, losing your independence, or being kept alive by machines. ¨ Where would you prefer to die? Choices include your home, a hospital, or a nursing home. ¨ Would you like to have information about hospice care to support you and your family? ¨ Do you want to donate organs when you die? ¨ Do you want certain Mandaeism practices performed before you die? If so, put your wishes in the advance directive. · Read your advance directive every year, and make changes as needed. When should you call for help? Be sure to contact your doctor if you have any questions. Where can you learn more? Go to http://neel-stone.info/. Enter R264 in the search box to learn more about \"Advance Directives: Care Instructions. \" Current as of: November 17, 2016 Content Version: 11.3 © 2284-7731 [employer]. Care instructions adapted under license by [employer] (which disclaims liability or warranty for this information). If you have questions about a medical condition or this instruction, always ask your healthcare professional. [address]  any warranty or liability for your use of this information. Introducing [facility]! [address] introduces [employer] patient portal. Now you can access parts of your medical record, email your doctor's office, and request medication refills online. 1. In your internet browser, go to [email or web address] 2. Click on the First Time User? Click Here link in the Sign In box. You will see the New Member Sign Up page. 3. Enter your [employer] Access Code exactly as it appears below. You will not need to use this code after youve completed the sign-up process. If you do not sign up before the expiration date, you must request a new code. · [employer] Access Code: [email or web address] Expires: 10/29/2017  9:26 AM 
 
4. Enter the last four digits of your Social Security Number (xxxx) and Date of Birth (mm/dd/yyyy) as indicated and click Submit. You will be taken to the next sign-up page. 5. Create a [employer] ID. This will be your [employer] login ID and cannot be changed, so think of one that is secure and easy to remember. 6. Create a [employer] password. You can change your password at any time. 7. Enter your Password Reset Question and Answer. This can be used at a later time if you forget your password. 8. Enter your e-mail address. You will receive e-mail notification when new information is available in [address] 9. Click Sign Up. You can now view and download portions of your medical record. 10. Click the Download Summary menu link to download a portable copy of your medical information. If you have questions, please visit the Frequently Asked Questions section of the [employer] website. Remember, [employer] is NOT to be used for urgent needs. For medical emergencies, dial 911. Now available from your iPhone and Android! Please provide this summary of care documentation to your next provider. Your primary care clinician is listed as [id number] [facility]. If you have any questions after today's visit, please call [phone].

## 2017-10-11 ENCOUNTER — OFFICE VISIT (OUTPATIENT)
Dept: ORTHOPEDIC SURGERY | Age: 62
End: 2017-10-11

## 2017-10-11 VITALS
HEART RATE: 89 BPM | WEIGHT: 276 LBS | DIASTOLIC BLOOD PRESSURE: 76 MMHG | BODY MASS INDEX: 40.88 KG/M2 | TEMPERATURE: 98.1 F | SYSTOLIC BLOOD PRESSURE: 124 MMHG | HEIGHT: 69 IN | RESPIRATION RATE: 18 BRPM

## 2017-10-11 DIAGNOSIS — M48.061 SPINAL STENOSIS OF LUMBAR REGION WITHOUT NEUROGENIC CLAUDICATION: Primary | ICD-10-CM

## 2017-10-11 DIAGNOSIS — M17.0 OSTEOARTHRITIS OF BOTH KNEES, UNSPECIFIED OSTEOARTHRITIS TYPE: ICD-10-CM

## 2017-10-11 DIAGNOSIS — M47.816 LUMBAR FACET ARTHROPATHY: ICD-10-CM

## 2017-10-11 DIAGNOSIS — N28.1 BILATERAL RENAL CYSTS: ICD-10-CM

## 2017-10-11 DIAGNOSIS — M62.830 MUSCLE SPASM OF BACK: ICD-10-CM

## 2017-10-11 NOTE — PATIENT INSTRUCTIONS
Low Back Arthritis: Exercises  Your Care Instructions  Here are some examples of typical rehabilitation exercises for your condition. Start each exercise slowly. Ease off the exercise if you start to have pain. Your doctor or physical therapist will tell you when you can start these exercises and which ones will work best for you. When you are not being active, find a comfortable position for rest. Some people are comfortable on the floor or a medium-firm bed with a small pillow under their head and another under their knees. Some people prefer to lie on their side with a pillow between their knees. Don't stay in one position for too long. Take short walks (10 to 20 minutes) every 2 to 3 hours. Avoid slopes, hills, and stairs until you feel better. Walk only distances you can manage without pain, especially leg pain. How to do the exercises  Pelvic tilt    1. Lie on your back with your knees bent. 2. \"Brace\" your stomach--tighten your muscles by pulling in and imagining your belly button moving toward your spine. 3. Press your lower back into the floor. You should feel your hips and pelvis rock back. 4. Hold for 6 seconds while breathing smoothly. 5. Relax and allow your pelvis and hips to rock forward. 6. Repeat 8 to 12 times. Back stretches    1. Get down on your hands and knees on the floor. 2. Relax your head and allow it to droop. Round your back up toward the ceiling until you feel a nice stretch in your upper, middle, and lower back. Hold this stretch for as long as it feels comfortable, or about 15 to 30 seconds. 3. Return to the starting position with a flat back while you are on your hands and knees. 4. Let your back sway by pressing your stomach toward the floor. Lift your buttocks toward the ceiling. 5. Hold this position for 15 to 30 seconds. 6. Repeat 2 to 4 times. Follow-up care is a key part of your treatment and safety.  Be sure to make and go to all appointments, and call your doctor if you are having problems. It's also a good idea to know your test results and keep a list of the medicines you take. Where can you learn more? Go to http://neel-stone.info/. Enter R219 in the search box to learn more about \"Low Back Arthritis: Exercises. \"  Current as of: March 21, 2017  Content Version: 11.3  © 9951-0200 [employer]. Care instructions adapted under license by [employer] (which disclaims liability or warranty for this information). If you have questions about a medical condition or this instruction, always ask your healthcare professional. [place] [address] any warranty or liability for your use of this information. Lumbar Spinal Stenosis: Care Instructions  Your Care Instructions    Stenosis in the spine is a narrowing of the canal that is around the spinal cord and nerve roots in your back. It can happen as part of aging. Sometimes bone and other tissue grow into this canal and press on the nerves that branch out from the spinal cord. This can cause pain, numbness, and weakness. When it happens in the lower part of your back, it is called lumbar spinal stenosis. It can cause problems in the legs, feet, and rear end (buttocks). You may be able to get relief from the symptoms of spinal stenosis by taking pain medicine. Your doctor may suggest physical therapy and exercises to keep your spine strong and flexible. Some people try steroid shots to reduce swelling. If pain and numbness in your legs are still so bad that you cannot do your normal activities, you may need surgery. Follow-up care is a key part of your treatment and safety. Be sure to make and go to all appointments, and call your doctor if you are having problems. It's also a good idea to know your test results and keep a list of the medicines you take. How can you care for yourself at home?   · Take an over-the-counter pain medicine, such as acetaminophen (Tylenol), ibuprofen (Advil, Motrin), or naproxen (Aleve). Be safe with medicines. Read and follow all instructions on the label. · Do not take two or more pain medicines at the same time unless the doctor told you to. Many pain medicines have acetaminophen, which is Tylenol. Too much acetaminophen (Tylenol) can be harmful. · Stay at a healthy weight. Being overweight puts extra strain on your spine. · Change positions often when you sit or stand. This can ease pain. It may also reduce pressure on the spinal cord and its nerves. · Avoid doing things that make your symptoms worse. Walking downhill and standing for a long time may cause pain. · Stretch and strengthen your back muscles as your doctor or physical therapist recommends. If your doctor says it is okay to do them, these exercises may help. ¨ Lie on your back with your knees bent. Gently pull one bent knee to your chest. Put that foot back on the floor, and then pull the other knee to your chest.  ¨ Do pelvic tilts. Lie on your back with your knees bent. Tighten your stomach muscles. Pull your belly button (navel) in and up toward your ribs. You should feel like your back is pressing to the floor and your hips and pelvis are slightly lifting off the floor. Hold for 6 seconds while breathing smoothly. ¨ Stand with your back flat against a wall. Slowly slide down until your knees are slightly bent. Hold for 10 seconds, then slide back up the wall. · Remove or change anything in your house that may cause you to fall. Keep walkways clear of clutter, electrical cords, and throw rugs. When should you call for help? Call 911 anytime you think you may need emergency care. For example, call if:  · You are unable to move a leg at all. Call your doctor now or seek immediate medical care if:  · You have new or worse symptoms in your legs, belly, or buttocks. Symptoms may include:  ¨ Numbness or tingling. ¨ Weakness. ¨ Pain.   · You lose bladder or bowel control. Watch closely for changes in your health, and be sure to contact your doctor if:  · You are not getting better as expected. Where can you learn more? Go to http://neel-stone.info/. Geovanna Marcelo in the search box to learn more about \"Lumbar Spinal Stenosis: Care Instructions. \"  Current as of: March 21, 2017  Content Version: 11.3  © 9225-3767 [employer]. Care instructions adapted under license by [employer] (which disclaims liability or warranty for this information). If you have questions about a medical condition or this instruction, always ask your healthcare professional. [place] [address] any warranty or liability for your use of this information. Learning About Medial Branch Block and Neurotomy  What are medial branch block and neurotomy? Facet joints connect your vertebrae to each other. Problems in these joints can cause chronic (long-term) pain in the neck or back. They can sometimes affect the shoulders, arms, buttocks, or legs. Medial branch nerves are the nerves that carry many of the pain messages from your facet joints. Radiofrequency medial branch neurotomy is a type of medial branch neurotomy that is used to relieve arthritis pain. It uses radio waves to damage nerves in your neck or back so that they can no longer send pain messages to your brain. Before your doctor knows if a neurotomy will help you, he or she will do a medial branch block to find out if certain nerves are the ones that are a source of your pain. You will need two separate visits to the outpatient center or hospital to have both procedures. How is a medial branch block done? The doctor will use a tiny needle to numb the skin where you will get the block. Then he or she puts the block needle into the numbed area. You may feel some pressure, but you should not feel pain.  Using fluoroscopy (live X-ray) to guide the needle, the doctor injects medicine onto one or more nerves to make them numb. If you get relief from your pain in the next 4 to 6 hours, it's a sign that those nerves may be contributing to your pain. The relief will last only a short time. You may then have a medial branch neurotomy at a later visit to try to get longer relief. It takes 20 to 30 minutes to get the block. You can go home after the doctor watches you for about an hour. You will get instructions on how to report how much pain you have when you are at home. You will need someone to drive you home. How is medial branch neurotomy done? The doctor will use a tiny needle to numb the skin where you will get the neurotomy. Then he or she puts the neurotomy needle into the numbed area. You may feel some pressure. Using fluoroscopy (live X-ray) to guide the needle, the doctor sends radio waves through the needle to the nerve for 60 to 90 seconds. The radio waves heat the nerve, which damages it. The doctor may do this several times. And he or she may treat more than one nerve. It takes 45 to 90 minutes to get a neurotomy, depending on how many nerves are heated. You will probably go home 30 to 60 minutes later. You will need someone to drive you home. What can you expect after a neurotomy? You may feel a little sore or tender at the injection site at first. But after a successful neurotomy, most people have pain relief right away. It often lasts for 9 to 12 months or longer. Sometimes the pain relief is permanent. If your pain does come back, it may mean that the damaged nerve has healed and can send pain messages again. Or it can mean that a different nerve is causing pain. Your doctor will discuss your options with you. Follow-up care is a key part of your treatment and safety. Be sure to make and go to all appointments, and call your doctor if you are having problems. It's also a good idea to know your test results and keep a list of the medicines you take.   Where can you learn more? Go to http://neel-stone.info/. Enter O751 in the search box to learn more about \"Learning About Medial Branch Block and Neurotomy. \"  Current as of: October 14, 2016  Content Version: 11.3  © 5963-6872 [employer], [facility]. Care instructions adapted under license by [facility] (which disclaims liability or warranty for this information). If you have questions about a medical condition or this instruction, always ask your healthcare professional. [place] [address] any warranty or liability for your use of this information.

## 2017-10-11 NOTE — PROGRESS NOTES
[facility]  [address] [place], [facility] [address]  Phone: [phone]  Fax: [phone]      ASSESSMENT   Diagnoses and all orders for this visit:    1. Spinal stenosis of lumbar region without neurogenic claudication    2. Lumbar facet arthropathy  -     REFERRAL TO PAIN MANAGEMENT    3. Muscle spasm of back  -     REFERRAL TO PAIN MANAGEMENT    4. Bilateral renal cysts  -     US RETROPERITONEUM COMP; Future    5. Osteoarthritis of both knees, unspecified osteoarthritis type         IMPRESSION AND PLAN:  Celestina Betancourt is a 58 y.o. male with history of chronic lumbar pain. He c/o pain in the lower back that radiates down the right thigh. Pt reports difficulty extending his back when waking in the morning and states that this improves as the day progresses. 1) Pt was given information on lumbar arthritis exercises and spinal stenosis. 2) A renal ultrasound was ordered. 3) Discussed treatment options with the Pt, including physical therapy, steroid injections, a RFA, and surgical intervention. 4) Pt was given information on radiofrequency ablation. 5) He was referred to Dr. Jameel Mo for lumbar RFA evaluation. 6) Mr. Do Bolaños has a reminder for a \"due or due soon\" health maintenance. I have asked that he contact his primary care provider, Governor Stefania MD, for follow-up on this health maintenance. 7)  demonstrated consistency with prescribing. 8) Pt will follow-up in 1 month. HISTORY OF PRESENT ILLNESS:  Celestina Betancourt is a 58 y.o. male with history of chronic lumbar pain. He presents to the office today for lumbar MRI follow up. He c/o pain in the lower back that radiates down the right thigh. Pt reports difficulty extending his back when waking in the morning and states that this improves as the day progresses. 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  Pt at this time desires to proceed with a referral to Dr. Sully Nunes for lumbar RFA evaluation. Pain Scale: 5/10    PCP: Dalton Norris MD       Past Medical History:   Diagnosis Date    Arthritis     Headache     Hypercholesterolemia     Hypertension     PTSD (post-traumatic stress disorder)     TBI (traumatic brain injury) ([facility] [address]         Social History     Social History    Marital status:      Spouse name: N/A    Number of children: N/A    Years of education: N/A     Occupational History    Not on file. Social History Main Topics    Smoking status: Never Smoker    Smokeless tobacco: Not on file    Alcohol use No    Drug use: Not on file    Sexual activity: Not on file     Other Topics Concern    Not on file     Social History Narrative       Current Outpatient Prescriptions   Medication Sig Dispense Refill    diclofenac (VOLTAREN) 1 % gel Apply  to affected area four (4) times daily. Apply to both knees qid as directed 5 Each 3    BOTOX 200 unit injection       doxycycline (MONODOX) 100 mg capsule Take 100 mg by mouth daily.  amLODIPine (NORVASC) 10 mg tablet Take 1 Tab by mouth daily. 90 Tab 3    celecoxib (CELEBREX) 200 mg capsule Take 1 Cap by mouth two (2) times a day. 180 Cap 0    gabapentin (NEURONTIN) 300 mg capsule Take 300 mg by mouth three (3) times daily.  amoxicillin 500 mg tab Take  by mouth.  cinnamon bark (CINNAMON) 500 mg cap Take  by mouth.  DULoxetine (CYMBALTA) 30 mg capsule Take 30 mg by mouth daily.  raNITIdine (ZANTAC) 150 mg tablet Take 150 mg by mouth two (2) times a day.  sertraline (ZOLOFT) 100 mg tablet Take  by mouth daily.  tamsulosin (FLOMAX) 0.4 mg capsule Take 0.4 mg by mouth daily.  traZODone (DESYREL) 150 mg tablet Take 150 mg by mouth nightly.  multivit with min-folic acid (ONE-A-DAY MEN VITACRAVES) 200 mcg chew Take  by mouth.  telmisartan-hydroCHLOROthiazide (MICARDIS HCT) 80-12.5 mg per tablet Take 1 Tab by mouth daily. 90 Tab 1    finasteride (PROSCAR) 5 mg tablet Take 1 Tab by mouth daily. 90 Tab 1    meclizine (ANTIVERT) 25 mg tablet Take 1 Tab by mouth daily. 90 Tab 1    Omeprazole delayed release (PRILOSEC D/R) 20 mg tablet Take 1 Tab by mouth daily. 90 Tab 1    sildenafil citrate (VIAGRA) 100 mg tablet Take 1 Tab by mouth as needed. Indications: Erectile Dysfunction 24 Tab 1    cetirizine (ZYRTEC) 10 mg tablet Take  by mouth.  DOCOSAHEXANOIC ACID/EPA (FISH OIL PO) Take  by mouth. No Known Allergies      REVIEW OF SYSTEMS    Constitutional: Negative for fever, chills, or weight change. Respiratory: Negative for cough or shortness of breath. Cardiovascular: Negative for chest pain or palpitations. Gastrointestinal: Negative for acid reflux, change in bowel habits, or constipation. Genitourinary: Negative for dysuria and flank pain. Musculoskeletal: Positive for lumbar pain. Skin: Negative for rash. Neurological: Negative for headaches, dizziness, or numbness. Endo/Heme/Allergies: Negative for increased bruising. Psychiatric/Behavioral: Negative for difficulty with sleep. PHYSICAL EXAMINATION  Visit Vitals    /76    Pulse 89    Temp 98.1 °F (36.7 °C) (Oral)    Resp 18    Ht 5' 9\" (1.753 m)    Wt 276 lb (125.2 kg)    BMI 40.76 kg/m2       Constitutional: Awake, alert, and in no acute distress. Neurological: 1+ symmetrical DTRs in the lower extremities. Sensation to light touch is intact. Skin: warm, dry, and intact. Musculoskeletal: Tenderness to palpation in the lower lumbar region. Pain with extension and axial loading. Better with forward flexion. Difficulty transitioning from sit to stand. No pain with internal or external rotation of his hips. Negative straight leg raise bilaterally. Patient ambulates with the assistance of a single point cane.      Hip Flex  Quads Hamstrings Ankle DF EHL Ankle PF   Right +4/5 +4/5 +4/5 +4/5 +4/5 +4/5   Left +4/5 +4/5 +4/5 +4/5 +4/5 +4/5     IMAGING:    Lumbar spine MRI from 09/14/2017 was personally reviewed with the patient and demonstrated:    [facility]   MRI LUMB SPINE WO CONT   Narrative EXAM:  MRI LUMB SPINE WO CONT    INDICATION:   Degenerative disc disease lumbar, lumbar facet arthropathy, muscle  spasm of back    COMPARISON: None    TECHNIQUE:   MR imaging of the lumbar spine was performed with sagittal T1, T2, STIR;  axial  T1, T2. Contrast was not administered. FINDINGS:  Normal lumbar spine alignment. Vertebral body heights maintained. Disc space  heights are maintained and hydrated. Normal bone marrow signal. Normal conus  medullaris signal terminating at T12/L1 level. There is developmental narrowing  of the lumbar spinal canal due to short pedicles. Visualized lower thoracic levels T11-L1 demonstrate unremarkable discs with  patent canal and foramina. Correlation of sagittal and axial images demonstrates the following:    T12/L1: Unremarkable disc. No central canal or foraminal stenosis. Loletta Nunnery L1/2:  Unremarkable disc. No central canal or foraminal stenosis. L2/3:  Unremarkable disc. Mild facet and ligamentum hypertrophy. Mild central  canal stenosis. No foraminal stenosis. L3/4:  Minimal disc bulge. Mild facet and ligamentum hypertrophy. Mild central  canal stenosis. Mild foraminal stenosis    L4 level: At the level of L4 vertebral body there is abundant epidural fat and  moderate narrowing of the thecal sac. L4/5:  Mild disc bulge with central disc protrusion. Moderate facet and  ligamentum hypertrophy. Moderate to severe central canal stenosis. Moderate  foraminal stenosis. L5 level: At the level of L5 vertebral body, there is abundant epidural fat and  moderate to severe narrowing of the thecal sac. L5/S1:  Mild disc bulge. Moderate facet and ligamentum hypertrophy. Prominent  epidural fat. Lack of CSF and the tapering of the thecal sac.  Moderate foraminal  stenosis. Incompletely evaluated bilateral T2 hyperintense lesions in the kidneys up to 2  cm. Impression IMPRESSION:      1. Mild/moderate degenerative changes in the lower lumbar spine superimposed on  developmentally small central canal and prominent epidural  fat resulting in  moderate to severe compression of the thecal sac from L3/4 disc level to L5/S1  as discussed. Moderate foraminal stenosis at L4/5 and L5/S1. 2. Bilateral renal lesions up to 2 cm, likely cysts and can be evaluated with  ultrasound. Written by Araseli Rea, as dictated by Ryan Gregg MD.  I, Dr. Ryan Gregg confirm that all documentation is accurate.

## 2017-10-11 NOTE — MR AVS SNAPSHOT
Visit Information Date & Time Provider Department Dept. Phone Encounter #  
 10/11/2017  7:50 AM Jessica Stringer MD [facility] [phone] [id number] Follow-up Instructions Return in about 1 month (around 11/11/2017) for Diagnostic Test follow up. Routing History Your Appointments 12/28/2017  8:30 AM  
Follow Up with Teresita Díaz MD  
[facility] [address]) Appt Note: 3mo f/u  
 [address] [facility] [address] [place] [address]  
[phone]  
  
   
 [facility] [address] [place] [address] Upcoming Health Maintenance Date Due COLONOSCOPY 10/7/2020 DTaP/Tdap/Td series (2 - Td) 6/29/2027 Allergies as of 10/11/2017  Review Complete On: 10/11/2017 By: Rashid Gurrola LPN No Known Allergies Current Immunizations  Never Reviewed No immunizations on file. Not reviewed this visit You Were Diagnosed With   
  
 Codes Comments Spinal stenosis of lumbar region without neurogenic claudication    -  Primary ICD-10-CM: M48.061 
ICD-9-CM: 724.02 Lumbar facet arthropathy     ICD-10-CM: M12.88 ICD-9-CM: 721.3 Muscle spasm of back     ICD-10-CM: F09.556 ICD-9-CM: 724.8 Bilateral renal cysts     ICD-10-CM: N28.1 ICD-9-CM: 753.10 Osteoarthritis of both knees, unspecified osteoarthritis type     ICD-10-CM: M17.0 ICD-9-CM: 715.96 Vitals BP Pulse Temp Resp Height(growth percentile) Weight(growth percentile) 124/76 89 98.1 °F (36.7 °C) (Oral) 18 5' 9\" (1.753 m) 276 lb (125.2 kg) BMI Smoking Status 40.76 kg/m2 Never Smoker BMI and BSA Data Body Mass Index Body Surface Area 40.76 kg/m 2 2.47 m 2 Preferred Pharmacy Pharmacy Name Phone [facility], [address] [facility] [phone] Your Updated Medication List  
  
   
 This list is accurate as of: 10/11/17  8:41 AM.  Always use your most recent med list. amLODIPine 10 mg tablet Commonly known as:  Lenrachelle Eagles Take 1 Tab by mouth daily. amoxicillin 500 mg Tab Take  by mouth. BOTOX 200 unit injection Generic drug:  onabotulinumtoxinA  
  
 celecoxib 200 mg capsule Commonly known as:  CELEBREX Take 1 Cap by mouth two (2) times a day. cetirizine 10 mg tablet Commonly known as:  ZYRTEC Take  by mouth. CINNAMON 500 mg Cap Generic drug:  cinnamon bark Take  by mouth. diclofenac 1 % Gel Commonly known as:  VOLTAREN Apply  to affected area four (4) times daily. Apply to both knees qid as directed  
  
 doxycycline 100 mg capsule Commonly known as:  Mitchael Oxford Take 100 mg by mouth daily. DULoxetine 30 mg capsule Commonly known as:  CYMBALTA Take 30 mg by mouth daily. finasteride 5 mg tablet Commonly known as:  PROSCAR Take 1 Tab by mouth daily. FISH OIL PO Take  by mouth.  
  
 gabapentin 300 mg capsule Commonly known as:  NEURONTIN Take 300 mg by mouth three (3) times daily. meclizine 25 mg tablet Commonly known as:  ANTIVERT Take 1 Tab by mouth daily. Omeprazole delayed release 20 mg tablet Commonly known as:  PRILOSEC D/R Take 1 Tab by mouth daily. ONE-A-DAY MEN VITACRAVES 200 mcg Chew Generic drug:  multivit with min-folic acid Take  by mouth. raNITIdine 150 mg tablet Commonly known as:  ZANTAC Take 150 mg by mouth two (2) times a day. sertraline 100 mg tablet Commonly known as:  ZOLOFT Take  by mouth daily. sildenafil citrate 100 mg tablet Commonly known as:  VIAGRA Take 1 Tab by mouth as needed. Indications: Erectile Dysfunction  
  
 tamsulosin 0.4 mg capsule Commonly known as:  FLOMAX Take 0.4 mg by mouth daily. telmisartan-hydroCHLOROthiazide 80-12.5 mg per tablet Commonly known as:  MICARDIS HCT  
 Take 1 Tab by mouth daily. traZODone 150 mg tablet Commonly known as:  Carry [place] Take 150 mg by mouth nightly. We Performed the Following REFERRAL TO PAIN MANAGEMENT [[id number] Custom] Comments:  
 Refer for Lumbar RFA Follow-up Instructions Return in about 1 month (around 11/11/2017) for Diagnostic Test follow up. To-Do List   
 10/12/2017 1:00 PM  
  Appointment with [facility] 1 at [address] [place] [address] ([phone]) OUTSIDE FILMS  - Any outside films related to the study being scheduled should be brought with you on the day of the exam.  If this cannot be done there may be a delay in the reading of the study. MEDICATIONS  - Patient must bring a complete list of all medications currently taking to include prescriptions, over-the-counter meds, herbals, vitamins & any dietary supplements  GENERAL -Patient must drink 32 ounces of water 1 hour prior to the exam. -Patient must arrive with a full bladder. 10/18/2017 Imaging:  US RETROPERITONEUM COMP Referral Information Referral ID Referred By Referred To  
  
 [id number] Haydee Rodriguez MD   
   [address] [place] [address] [facility] [address] Phone: [phone] Fax: [phone] Visits Status Start Date End Date 1 New Request 10/11/17 10/11/18 If your referral has a status of pending review or denied, additional information will be sent to support the outcome of this decision. Patient Instructions Low Back Arthritis: Exercises Your Care Instructions Here are some examples of typical rehabilitation exercises for your condition. Start each exercise slowly. Ease off the exercise if you start to have pain. Your doctor or physical therapist will tell you when you can start these exercises and which ones will work best for you. When you are not being active, find a comfortable position for rest. Some people are comfortable on the floor or a medium-firm bed with a small pillow under their head and another under their knees. Some people prefer to lie on their side with a pillow between their knees. Don't stay in one position for too long. Take short walks (10 to 20 minutes) every 2 to 3 hours. Avoid slopes, hills, and stairs until you feel better. Walk only distances you can manage without pain, especially leg pain. How to do the exercises Pelvic tilt 1. Lie on your back with your knees bent. 2. \"Brace\" your stomachtighten your muscles by pulling in and imagining your belly button moving toward your spine. 3. Press your lower back into the floor. You should feel your hips and pelvis rock back. 4. Hold for 6 seconds while breathing smoothly. 5. Relax and allow your pelvis and hips to rock forward. 6. Repeat 8 to 12 times. Back stretches 1. Get down on your hands and knees on the floor. 2. Relax your head and allow it to droop. Round your back up toward the ceiling until you feel a nice stretch in your upper, middle, and lower back. Hold this stretch for as long as it feels comfortable, or about 15 to 30 seconds. 3. Return to the starting position with a flat back while you are on your hands and knees. 4. Let your back sway by pressing your stomach toward the floor. Lift your buttocks toward the ceiling. 5. Hold this position for 15 to 30 seconds. 6. Repeat 2 to 4 times. Follow-up care is a key part of your treatment and safety. Be sure to make and go to all appointments, and call your doctor if you are having problems. It's also a good idea to know your test results and keep a list of the medicines you take. Where can you learn more? Go to http://neel-stone.info/. Enter D528 in the search box to learn more about \"Low Back Arthritis: Exercises. \" Current as of: March 21, 2017 Content Version: 11.3 © 8276-0219 [employer]. Care instructions adapted under license by [employer] (which disclaims liability or warranty for this information). If you have questions about a medical condition or this instruction, always ask your healthcare professional. [address] any warranty or liability for your use of this information. Lumbar Spinal Stenosis: Care Instructions Your Care Instructions Stenosis in the spine is a narrowing of the canal that is around the spinal cord and nerve roots in your back. It can happen as part of aging. Sometimes bone and other tissue grow into this canal and press on the nerves that branch out from the spinal cord. This can cause pain, numbness, and weakness. When it happens in the lower part of your back, it is called lumbar spinal stenosis. It can cause problems in the legs, feet, and rear end (buttocks). You may be able to get relief from the symptoms of spinal stenosis by taking pain medicine. Your doctor may suggest physical therapy and exercises to keep your spine strong and flexible. Some people try steroid shots to reduce swelling. If pain and numbness in your legs are still so bad that you cannot do your normal activities, you may need surgery. Follow-up care is a key part of your treatment and safety. Be sure to make and go to all appointments, and call your doctor if you are having problems. It's also a good idea to know your test results and keep a list of the medicines you take. How can you care for yourself at home? · Take an over-the-counter pain medicine, such as acetaminophen (Tylenol), ibuprofen (Advil, Motrin), or naproxen (Aleve). Be safe with medicines. Read and follow all instructions on the label. · Do not take two or more pain medicines at the same time unless the doctor told you to.  Many pain medicines have acetaminophen, which is Tylenol. Too much acetaminophen (Tylenol) can be harmful. · Stay at a healthy weight. Being overweight puts extra strain on your spine. · Change positions often when you sit or stand. This can ease pain. It may also reduce pressure on the spinal cord and its nerves. · Avoid doing things that make your symptoms worse. Walking downhill and standing for a long time may cause pain. · Stretch and strengthen your back muscles as your doctor or physical therapist recommends. If your doctor says it is okay to do them, these exercises may help. ¨ Lie on your back with your knees bent. Gently pull one bent knee to your chest. Put that foot back on the floor, and then pull the other knee to your chest. 
¨ Do pelvic tilts. Lie on your back with your knees bent. Tighten your stomach muscles. Pull your belly button (navel) in and up toward your ribs. You should feel like your back is pressing to the floor and your hips and pelvis are slightly lifting off the floor. Hold for 6 seconds while breathing smoothly. ¨ Stand with your back flat against a wall. Slowly slide down until your knees are slightly bent. Hold for 10 seconds, then slide back up the wall. · Remove or change anything in your house that may cause you to fall. Keep walkways clear of clutter, electrical cords, and throw rugs. When should you call for help? Call 911 anytime you think you may need emergency care. For example, call if: 
· You are unable to move a leg at all. Call your doctor now or seek immediate medical care if: 
· You have new or worse symptoms in your legs, belly, or buttocks. Symptoms may include: ¨ Numbness or tingling. ¨ Weakness. ¨ Pain. · You lose bladder or bowel control. Watch closely for changes in your health, and be sure to contact your doctor if: 
· You are not getting better as expected. Where can you learn more? Go to http://neel-stone.info/. Harrison Russell in the search box to learn more about \"Lumbar Spinal Stenosis: Care Instructions. \" Current as of: March 21, 2017 Content Version: 11.3 © 1408-5797 [employer]byMe. Care instructions adapted under license by [employer] (which disclaims liability or warranty for this information). If you have questions about a medical condition or this instruction, always ask your healthcare professional. [place] [address] any warranty or liability for your use of this information. Learning About Medial Branch Block and Neurotomy What are medial branch block and neurotomy? Facet joints connect your vertebrae to each other. Problems in these joints can cause chronic (long-term) pain in the neck or back. They can sometimes affect the shoulders, arms, buttocks, or legs. Medial branch nerves are the nerves that carry many of the pain messages from your facet joints. Radiofrequency medial branch neurotomy is a type of medial branch neurotomy that is used to relieve arthritis pain. It uses radio waves to damage nerves in your neck or back so that they can no longer send pain messages to your brain. Before your doctor knows if a neurotomy will help you, he or she will do a medial branch block to find out if certain nerves are the ones that are a source of your pain. You will need two separate visits to the outpatient center or hospital to have both procedures. How is a medial branch block done? The doctor will use a tiny needle to numb the skin where you will get the block. Then he or she puts the block needle into the numbed area. You may feel some pressure, but you should not feel pain. Using fluoroscopy (live X-ray) to guide the needle, the doctor injects medicine onto one or more nerves to make them numb. If you get relief from your pain in the next 4 to 6 hours, it's a sign that those nerves may be contributing to your pain.  The relief will last only a short time. You may then have a medial branch neurotomy at a later visit to try to get longer relief. It takes 20 to 30 minutes to get the block. You can go home after the doctor watches you for about an hour. You will get instructions on how to report how much pain you have when you are at home. You will need someone to drive you home. How is medial branch neurotomy done? The doctor will use a tiny needle to numb the skin where you will get the neurotomy. Then he or she puts the neurotomy needle into the numbed area. You may feel some pressure. Using fluoroscopy (live X-ray) to guide the needle, the doctor sends radio waves through the needle to the nerve for 60 to 90 seconds. The radio waves heat the nerve, which damages it. The doctor may do this several times. And he or she may treat more than one nerve. It takes 45 to 90 minutes to get a neurotomy, depending on how many nerves are heated. You will probably go home 30 to 60 minutes later. You will need someone to drive you home. What can you expect after a neurotomy? You may feel a little sore or tender at the injection site at first. But after a successful neurotomy, most people have pain relief right away. It often lasts for 9 to 12 months or longer. Sometimes the pain relief is permanent. If your pain does come back, it may mean that the damaged nerve has healed and can send pain messages again. Or it can mean that a different nerve is causing pain. Your doctor will discuss your options with you. Follow-up care is a key part of your treatment and safety. Be sure to make and go to all appointments, and call your doctor if you are having problems. It's also a good idea to know your test results and keep a list of the medicines you take. Where can you learn more? Go to http://neel-stone.info/. Enter X224 in the search box to learn more about \"Learning About Medial Branch Block and Neurotomy. \" 
 Current as of: October 14, 2016 Content Version: 11.3 © 6415-0847 [employer], [employer]. Care instructions adapted under license by [employer] (which disclaims liability or warranty for this information). If you have questions about a medical condition or this instruction, always ask your healthcare professional. [address] any warranty or liability for your use of this information. Introducing [facility]! Kali Johnny introduces DesignMyNight patient portal. Now you can access parts of your medical record, email your doctor's office, and request medication refills online. 1. In your internet browser, go to [email or web address] 2. Click on the First Time User? Click Here link in the Sign In box. You will see the New Member Sign Up page. 3. Enter your DesignMyNight Access Code exactly as it appears below. You will not need to use this code after youve completed the sign-up process. If you do not sign up before the expiration date, you must request a new code. · DesignMyNight Access Code: [email or web address] Expires: 10/29/2017  9:26 AM 
 
4. Enter the last four digits of your Social Security Number (xxxx) and Date of Birth (mm/dd/yyyy) as indicated and click Submit. You will be taken to the next sign-up page. 5. Create a DesignMyNight ID. This will be your DesignMyNight login ID and cannot be changed, so think of one that is secure and easy to remember. 6. Create a DesignMyNight password. You can change your password at any time. 7. Enter your Password Reset Question and Answer. This can be used at a later time if you forget your password. 8. Enter your e-mail address. You will receive e-mail notification when new information is available in [address] 9. Click Sign Up. You can now view and download portions of your medical record. 10. Click the Download Summary menu link to download a portable copy of your medical information. If you have questions, please visit the Frequently Asked Questions section of the [employer] website. Remember, [employer] is NOT to be used for urgent needs. For medical emergencies, dial 911. Now available from your iPhone and Android! Please provide this summary of care documentation to your next provider. Your primary care clinician is listed as [id number] [facility]. If you have any questions after today's visit, please call [phone].

## 2017-10-12 ENCOUNTER — HOSPITAL ENCOUNTER (OUTPATIENT)
Dept: ULTRASOUND IMAGING | Age: 62
Discharge: HOME | End: 2017-10-12
Attending: PHYSICAL MEDICINE & REHABILITATION
Payer: MEDICARE

## 2017-10-12 DIAGNOSIS — N28.1 BILATERAL RENAL CYSTS: ICD-10-CM

## 2017-10-12 PROCEDURE — 76770 US EXAM ABDO BACK WALL COMP: CPT

## 2017-10-24 ENCOUNTER — TELEPHONE (OUTPATIENT)
Dept: ORTHOPEDIC SURGERY | Age: 62
End: 2017-10-24

## 2017-10-24 NOTE — TELEPHONE ENCOUNTER
Patient called as he was told by [facility] . Dr. Tera De La Rosa is not taking new patients at this time. What is patient to do now for his pain?   Please call him back at [phone]

## 2017-10-26 ENCOUNTER — OFFICE VISIT (OUTPATIENT)
Dept: PAIN MANAGEMENT | Age: 62
End: 2017-10-26

## 2017-10-26 VITALS
TEMPERATURE: 97 F | DIASTOLIC BLOOD PRESSURE: 97 MMHG | HEIGHT: 69 IN | HEART RATE: 90 BPM | RESPIRATION RATE: 14 BRPM | BODY MASS INDEX: 41.18 KG/M2 | WEIGHT: 278 LBS | SYSTOLIC BLOOD PRESSURE: 137 MMHG

## 2017-10-26 DIAGNOSIS — G89.4 CHRONIC PAIN SYNDROME: ICD-10-CM

## 2017-10-26 DIAGNOSIS — M47.816 LUMBAR FACET ARTHROPATHY: ICD-10-CM

## 2017-10-26 DIAGNOSIS — M47.816 LUMBAR SPONDYLOSIS: Primary | ICD-10-CM

## 2017-10-26 DIAGNOSIS — M51.37 DEGENERATIVE DISC DISEASE AT L5-S1 LEVEL: ICD-10-CM

## 2017-10-26 DIAGNOSIS — M48.061 SPINAL STENOSIS OF LUMBAR REGION WITHOUT NEUROGENIC CLAUDICATION: ICD-10-CM

## 2017-10-26 NOTE — TELEPHONE ENCOUNTER
Sent Tyrone Samples an email with [facility] re:Whether Dr. Veda Floyd is taking on new patients, I think patient might have misunderstood the conversation. I have sent over many patients and not heard of any being rejected. Waiting to hear back.

## 2017-10-26 NOTE — PROGRESS NOTES
[address] [facility]  Interventional Pain Management Consultation History & Physical    PATIENT NAME:  Js Dailey     YOB: 1955    DATE OF SERVICE:   10/26/2017      CHIEF COMPLAINT:  Back Pain and Knee Pain (right)      REASON FOR VISIT:   Js Dailey presents to the pain clinic today for initial evaluation and to consider interventional pain management options as indicated for the type and location of the pain the patient is presenting with. HISTORY OF PRESENT ILLNESS:   This is an initial evaluation and consideration for interventional procedures. Patient is referred to us from Dr. Tyler Paredes for consideration for lumbar radiofrequency neurotomy procedures as indicated. Patient states that he has had chronic low back pain of a long time. Likely many years. He has had a recent exacerbation of his low back pain over the last 6 months. He endorses primarily axial and paraspinal low back pain. Aching throbbing pain, spreads to either buttocks area. He denies radicular symptoms. He likely has associated knee pain which is due to knee osteoarthritis, unrelated to his low back pain. Patient endorses lumbar facet loading maneuvers that increase lumbar paraspinal pain symptoms, including lumbar twisting and turning, flexion and extension of the lumbar spine. These increase lumbar and low back pain symptoms. [place] He is tried medications with some benefit. He has not tried physical therapy. He is not previously had interventional pain procedures. He has not had previous lumbar spine surgery. He does not take blood thinners. We reviewed his lumbar MRI, this is dated from 2012. This is significant for lumbar facet arthropathy lumbar facet hypertrophy at several lumbar levels. Moderate to advanced canal and neuroforaminal narrowing due to epidural lipomatosis at several levels.   Mild disc bulging otherwise noted.        ASSESSMENT/OPTIONS: as follows. We discussed options. I am in agreement with Dr. Segun Rm and that I believe this patient has signs and symptoms, as well as exam and imaging evidence suggestive of low back pain that is lumbar facet mediated and due to lumbar facet hypertrophy and lumbar facet arthropathy. I believe he will benefit from lumbar radiofrequency neurotomy procedures at bilateral L3-4, L4-5, L5-S1 levels with IV conscious sedation. I have discussed the risks and benefits, indications, contraindications, and side effects of intended procedure with the patient. I have used skeleton spine model to describe and discuss the procedure with the patient. I have answered all questions relating to the procedure. Patient understands the nature of the procedure and wishes to proceed. Patient has no further questions. MRI Results (most recent):    Results from [place] encounter on 09/14/17   MRI LUMB SPINE WO CONT   Narrative EXAM:  MRI LUMB SPINE WO CONT    INDICATION:   Degenerative disc disease lumbar, lumbar facet arthropathy, muscle  spasm of back    COMPARISON: None    TECHNIQUE:   MR imaging of the lumbar spine was performed with sagittal T1, T2, STIR;  axial  T1, T2. Contrast was not administered. FINDINGS:  Normal lumbar spine alignment. Vertebral body heights maintained. Disc space  heights are maintained and hydrated. Normal bone marrow signal. Normal conus  medullaris signal terminating at T12/L1 level. There is developmental narrowing  of the lumbar spinal canal due to short pedicles. Visualized lower thoracic levels T11-L1 demonstrate unremarkable discs with  patent canal and foramina. Correlation of sagittal and axial images demonstrates the following:    T12/L1: Unremarkable disc. No central canal or foraminal stenosis. [place] L1/2:  Unremarkable disc. No central canal or foraminal stenosis. L2/3:  Unremarkable disc.  Mild facet and ligamentum hypertrophy. Mild central  canal stenosis. No foraminal stenosis. L3/4:  Minimal disc bulge. Mild facet and ligamentum hypertrophy. Mild central  canal stenosis. Mild foraminal stenosis    L4 level: At the level of L4 vertebral body there is abundant epidural fat and  moderate narrowing of the thecal sac. L4/5:  Mild disc bulge with central disc protrusion. Moderate facet and  ligamentum hypertrophy. Moderate to severe central canal stenosis. Moderate  foraminal stenosis. L5 level: At the level of L5 vertebral body, there is abundant epidural fat and  moderate to severe narrowing of the thecal sac. L5/S1:  Mild disc bulge. Moderate facet and ligamentum hypertrophy. Prominent  epidural fat. Lack of CSF and the tapering of the thecal sac. Moderate foraminal  stenosis. Incompletely evaluated bilateral T2 hyperintense lesions in the kidneys up to 2  cm. Impression IMPRESSION:      1. Mild/moderate degenerative changes in the lower lumbar spine superimposed on  developmentally small central canal and prominent epidural  fat resulting in  moderate to severe compression of the thecal sac from L3/4 disc level to L5/S1  as discussed. Moderate foraminal stenosis at L4/5 and L5/S1. 2. Bilateral renal lesions up to 2 cm, likely cysts and can be evaluated with  ultrasound. PAST MEDICAL HISTORY:   The patient  has a past medical history of Arthritis; Headache; Hypercholesterolemia; Hypertension; PTSD (post-traumatic stress disorder); and TBI (traumatic brain injury) ([facility] [address] PAST SURGICAL HISTORY:   The patient  has a past surgical history that includes orthopaedic and other surgical (02/02/2017).     CURRENT MEDICATIONS:   The patient has a current medication list which includes the following prescription(s): diclofenac, botox, doxycycline, amlodipine, celecoxib, gabapentin, cinnamon bark, cetirizine, duloxetine, ranitidine, sertraline, tamsulosin, trazodone, docosahexanoic acid/epa, multivit with min-folic acid, telmisartan-hydrochlorothiazide, finasteride, meclizine, omeprazole delayed release, sildenafil citrate, and amoxicillin. ALLERGIES:   No Known Allergies    FAMILY HISTORY:   The patient family history is not on file. SOCIAL HISTORY:   The patient  reports that he has never smoked. He does not have any smokeless tobacco history on file. The patient  reports that he does not drink alcohol. He also  has no drug history on file. REVIEW OF SYSTEMS:    The patient denies fever, chills, weight loss (Constitutional), rash, itching (Skin), tinnitus, congestion (HENT), blurred vision, photophobia (Eyes), palpitations, orthopnea (Cardiovascular), hemoptysis, wheezing (Respiratory), nausea, vomiting, diarrhea (Gastrointestinal), dysuria, hematuria, urgency (Genitourinary), bowel or bladder incontinence, loss of consciousness (Neurologic), suicidal or homicidal ideation or hallucinations (Psychiatric). Denies swelling, axillary or groin masses (Lymphatic). PHYSICAL EXAM:  VS:   Visit Vitals    BP (!) 137/97  Comment: PCP to follow B/P    Pulse 90    Temp 97 °F (36.1 °C)    Resp 14    Ht 5' 9\" (1.753 m)    Wt 126.1 kg (278 lb)    BMI 41.05 kg/m2     General: Well-developed and well-nourished. Body habitus consistent with recorded height and weight and the calculated BMI. Apparent distress due to low back pain. Head: Normocephalic, atraumatic. Skin: Inspection of the skin reveals no rashes, lesions or infection. CV: Regular rate. No murmurs or rubs noted. No peripheral edema noted. Pulm: Respirations are even and unlabored. Extr: No clubbing, cyanosis, or edema noted. Musculoskeletal:  1. Cervical spine -decreased ROM. No paraspinous tenderness at any level. There is no scoliosis, asymmetry, or musculoskeletal defect. 2. Thoracic spine - Full ROM. No paraspinous tenderness at any level.   There is no scoliosis, asymmetry, or musculoskeletal defect. 3. Lumbar spine -decreased range of motion all axes . Paraspinous tenderness bilaterally . SI joints are nontender bilaterally. There is no scoliosis, asymmetry, or musculoskeletal defect. 4. Right upper extremity - Full ROM. 5/5 muscle strength in all muscle groups. No pain or tenderness in shoulder, elbow, wrist, or hand. 5. Left upper extremity - Full ROM. 5/5 muscle strength in all muscle groups. No pain or tenderness in shoulder, elbow, wrist, or hand. 6. Right lower extremity - Full ROM. 5/5 muscle strength in all muscle groups. No pain, tenderness, or swelling in the hip, knee, ankle or foot. 7. Left lower extremity - Full ROM. 5/5 muscle strength in all muscle groups. No pain, tenderness, or swelling in the hip, knee, ankle or foot. Neurological:  1. Mental Status - Alert, awake and oriented. Speech is clear and appropriate. 2. Cranial Nerves - Extraocular muscles intact bilaterally. Cranial nerves II-XII grossly intact bilaterally. 3. Gait - antalgic   4. Reflexes - 2+ and symmetric throughout. 5. Sensation - Intact to light touch and pin prick. 6. Provocative Tests - Straight leg raise negative bilaterally. Psychological:  1. Mood and affect - Appropriate. 2. Speech - Appropriate. 3. Though content - Appropriate. 4. Judgment - Appropriate. ASSESSMENT:      ICD-10-CM ICD-9-CM    1. Lumbar spondylosis M47.816 721.3    2. Lumbar facet arthropathy M12.88 721.3    3. Degenerative disc disease at L5-S1 level M51.36 722.52    4. Spinal stenosis of lumbar region without neurogenic claudication M48.061 724.02    5. Chronic pain syndrome G89.4 338.4            PLAN:    1.    I have thoroughly discussed the risks and benefits, indications, contraindications, and side effects of any and procedures that were mentioned at today's patient visit.  I have used a skeleton model to explain all procedures, as well as to provide added emphasis regarding procedures and as well for patient education purposes. I have answered all questions in great detail, and I have obtained verbal confirmation for all procedures planned with the patient. 3.    I have reviewed in great detail today the patient's MRI and other imaging studies with the patient. I have explained to the patient their condition using both actual recent and relevant images insofar as I am able to obtain actual images. I have used a skeleton model for added emphasis as well as patient education. 4.    I have advised patient to have a primary care provider continue to care for their health maintenance and general medical conditions. 5,    I have placed appropriate referrals to specialty care providers as I have deemed necessary through today's clinical consultation with the patient. 5.    I have explained to the patient that if any significant side effects, issues, problems, concerns, or perceived complications may have arisen at around the time of the patient's procedures, they should either call the pain management clinic or go to the emergency room immediately for medical provider evaluation. 6.   I have encouraged all patients to call the pain management clinic with any questions or concerns that they may have pertaining to their procedures. DISPOSITION:   The patients condition and plan were discussed at length and all questions were answered. The patient agrees with the plan. A total of 45 minutes was spent with the patient of which over half of the time was spent counseling the patient. Марина Gonzalez MD 10/26/2017 9:20 AM    Note: Although these clinic notes were documented by the provider at the time of the exam, they have not been proofed and are subject to transcription variance.

## 2017-10-30 ENCOUNTER — HOSPITAL ENCOUNTER (OUTPATIENT)
Age: 62
Setting detail: OUTPATIENT SURGERY
Discharge: HOME | End: 2017-10-30
Attending: PHYSICAL MEDICINE & REHABILITATION | Admitting: PHYSICAL MEDICINE & REHABILITATION
Payer: MEDICARE

## 2017-10-30 ENCOUNTER — APPOINTMENT (OUTPATIENT)
Dept: GENERAL RADIOLOGY | Age: 62
End: 2017-10-30
Attending: PHYSICAL MEDICINE & REHABILITATION
Payer: MEDICARE

## 2017-10-30 VITALS
HEIGHT: 69 IN | RESPIRATION RATE: 16 BRPM | SYSTOLIC BLOOD PRESSURE: 109 MMHG | BODY MASS INDEX: 41.18 KG/M2 | HEART RATE: 90 BPM | TEMPERATURE: 97.8 F | WEIGHT: 278 LBS | OXYGEN SATURATION: 96 % | DIASTOLIC BLOOD PRESSURE: 74 MMHG

## 2017-10-30 PROCEDURE — 74011000250 HC RX REV CODE- 250: Performed by: PHYSICAL MEDICINE & REHABILITATION

## 2017-10-30 PROCEDURE — 74011250637 HC RX REV CODE- 250/637: Performed by: PHYSICAL MEDICINE & REHABILITATION

## 2017-10-30 PROCEDURE — 74011000250 HC RX REV CODE- 250

## 2017-10-30 PROCEDURE — 77030003672 HC NDL SPN HALY -A: Performed by: PHYSICAL MEDICINE & REHABILITATION

## 2017-10-30 PROCEDURE — 76010000009 HC PAIN MGT 0 TO 30 MIN PROC: Performed by: PHYSICAL MEDICINE & REHABILITATION

## 2017-10-30 PROCEDURE — 74011250636 HC RX REV CODE- 250/636

## 2017-10-30 PROCEDURE — 74011250636 HC RX REV CODE- 250/636: Performed by: PHYSICAL MEDICINE & REHABILITATION

## 2017-10-30 RX ORDER — LIDOCAINE HYDROCHLORIDE 10 MG/ML
INJECTION, SOLUTION EPIDURAL; INFILTRATION; INTRACAUDAL; PERINEURAL AS NEEDED
Status: DISCONTINUED | OUTPATIENT
Start: 2017-10-30 | End: 2017-10-30 | Stop reason: HOSPADM

## 2017-10-30 RX ORDER — DIAZEPAM 5 MG/1
5-20 TABLET ORAL ONCE
Status: COMPLETED | OUTPATIENT
Start: 2017-10-30 | End: 2017-10-30

## 2017-10-30 RX ORDER — ROPIVACAINE HYDROCHLORIDE 2 MG/ML
INJECTION, SOLUTION EPIDURAL; INFILTRATION; PERINEURAL AS NEEDED
Status: DISCONTINUED | OUTPATIENT
Start: 2017-10-30 | End: 2017-10-30 | Stop reason: HOSPADM

## 2017-10-30 RX ADMIN — DIAZEPAM 10 MG: 5 TABLET ORAL at 11:29

## 2017-10-30 NOTE — DISCHARGE INSTRUCTIONS
[facility]      Post Procedures Instructions    *Resume Diet and Activity as tolerated. Rest for the remainder of the day. *You may fell worse before you feel better as the numbing medications wear off before the steroids take effect if used for your procedures. *Do not use affected extremity until numbness or loss of sensation has completely resolved without assistance. *DO NOT DRIVE, operate machinery/heavey equipment for 24 hours. *DO NOT DRINK ALCOHOL for 24 hours as it may interact with the sedation if you received it and also thins your blood and may cause you to bleed. *WAIT 24 hours before starting back ANY Blood thinning medications:   (Heparin, Coumadin, Warfarin, Lovenox, Plavix, Aggrenox)    *Resume Pre-Procedure Medications as prescribed except Blood Thinners unless directed by your Physician or Cardiologist.     *Avoid Hot tubs and Heating pad for 24 hours to prevent dissipation of medications, you may shower to remove bandages and remaining prep residue on the skin. * If you develop a Headache, drink plenty of fluids including beverages with caffeine (Coffee, Mt. Dew etc.) and rest.  If the headache persists longer than 24 hoursor intensifies - Please call [facility]) [phone]    * If you are DIABETIC, check your blood sugar three times a day for the next three days, the steroids will increase your blood sugar. If your blood sugar is greater than 400 have someone drive you to the nearest [address]. * If you experience any of the following problems, call the [facility] [phone] between 8:00 am - 4:30pm or After Hours [phone].     Shortness of breath    Fever of 101 F or higher    Nausea / Vomiting (not normal to you)    Increasing stiffness in the neck    Weakness or numbness in the arms or legs that is not resolving    Prolonged and increasing pain > than 4 days    ANYTHING OUT of the ORDINARY TO YOU    If YOU are experiencing a severe reaction / complication that you have never had before post procedure, call 911 or go to the nearest emergency room! All patients must have a  for transportation [place] regardless if you do or do not receive sedation. DISCHARGE SUMMARY from Nurse      PATIENT INSTRUCTIONS:    After Oral  or intravenous sedation, for 24 hours or while taking prescription Narcotics:  · Limit your activities  · Do not drive and operate hazardous machinery  · Do not make important personal or business decisions  · Do  not drink alcoholic beverages  · If you have not urinated within 8 hours after discharge, please contact your surgeon on call. Report the following to your surgeon:  · Excessive pain, swelling, redness or odor of or around the surgical area  · Temperature over 101  · Nausea and vomiting lasting longer than 4 hours or if unable to take medications  · Any signs of decreased circulation or nerve impairment to extremity: change in color, persistent  numbness, tingling, coldness or increase pain  · Any questions        What to do at Home:  Recommended activity: Activity as tolerated, NO DRIVING FOR 24 Hours post injection          *  Please give a list of your current medications to your Primary Care Provider. *  Please update this list whenever your medications are discontinued, doses are      changed, or new medications (including over-the-counter products) are added. *  Please carry medication information at all times in case of emergency situations. These are general instructions for a healthy lifestyle:    No smoking/ No tobacco products/ Avoid exposure to second hand smoke    Surgeon General's Warning:  Quitting smoking now greatly reduces serious risk to your health.     Obesity, smoking, and sedentary lifestyle greatly increases your risk for illness    A healthy diet, regular physical exercise & weight monitoring are important for maintaining a healthy lifestyle    You may be retaining fluid if you have a history of heart failure or if you experience any of the following symptoms:  Weight gain of 3 pounds or more overnight or 5 pounds in a week, increased swelling in our hands or feet or shortness of breath while lying flat in bed. Please call your doctor as soon as you notice any of these symptoms; do not wait until your next office visit. Recognize signs and symptoms of STROKE:    F-face looks uneven    A-arms unable to move or move unevenly    S-speech slurred or non-existent    T-time-call 911 as soon as signs and symptoms begin-DO NOT go       Back to bed or wait to see if you get better-TIME IS BRAIN.

## 2017-10-30 NOTE — INTERVAL H&P NOTE
H&P Update:  Js Dailey was seen and examined. History and physical has been reviewed. The patient has been examined.  There have been no significant clinical changes since the completion of the originally dated History and Physical.    Signed By: Raysa Guzman MD     October 30, 2017 10:53 AM

## 2017-10-30 NOTE — PROCEDURES
[facility]    DIAG LUMBAR FACET INJECTIONS  PROCEDURE REPORT      PATIENT:  Amanda Velazquez  YOB: 1955  DATE OF SERVICE:  10/30/2017  [facility]    PRE-PROCEDURE DIAGNOSIS:  See Above    POST-PROCEDURE DIAGNOSIS:  See Above                PROCEDURE:  1. Bilateral diagnostic lumbar medial branch blocks via the  L3/L4,  L4/L5,  L5/S1 medial branch nerves ( 62461, 50;  52581, 50;  40681, 50 )  2. Fluoroscopic needle guidance (41350)      LEVELS TREATED:  Bilateral sided  L3/L4,  L4/L5,  L5/S1 medial branch nerves     ANESTHESIA:  See Medication Administration Record    COMPLICATIONS: None. PHYSICIAN:  Maco Ellsworth MD    PRE-PROCEDURE NOTE:  Pre-procedural assessment of the patient was performed including a limited history and physical examination. The details of the procedure were discussed with the patient, including the risks, benefits and alternative options and an informed consent was obtained. The patients NPO status, if necessary for the specific procedure and/or administration of moderate intravenous sedation, if utilized, and availability of a responsible adult to escort the patient following the procedure were confirmed. PROCEDURE NOTE:  The patient was brought to the procedure suite and positioned on the fluoroscopy table in the prone position. Physiologic monitors were applied and supplemental oxygen was administered via nasal cannula. The skin was prepped in the standard surgical fashion and sterile drapes were applied over the procedure site. Please refer to the Flowsheet for documentation of the patients vital signs and the Medication Administration Record for any oral and/or intravenous sedation administered prior to or during the procedure. 1% Lidocaine was utilized for local anesthesia.  Under AP fluoroscopic guidance a 25-gauge, 3-1/2 inch short bevel spinal needle was advanced to the junction of the superior articular process and transverse process of each vertebral level immediately inferior to the above-mentioned dorsal rami medial branch nerves. A needle was also placed along the sacral ala to block the L5 medial branch nerve. After all needles were placed,  0.5 mL of 0.2% ropivacaine was injected at each location after the negative aspiration of blood, air or CSF. The needles were removed and the stilets were replaced. The procedure was performed on the contralateral side in the same fashion and at the same levels using the same volume of local anesthetic following negative aspiration of blood, air or CSF. The needles were removed intact. The area was thoroughly cleaned and sterile bandages applied as necessary. The patient tolerated the procedure well and vital signs remained stable throughout the procedure. The patient was assessed immediately following the procedure and was noted to have greater than 50% reduction in pain (a reduction from 7/10 to 2/10 in severity of lumbar pain). Based on these results, this procedure will be repeated to assess if the patient is an appropriate candidate for radiofrequency ablation of the above-mentioned medial branch nerves. Based on these results, the patient is considered an appropriate candidate for radiofrequency ablation of the above-mentioned medial branch nerves and will be scheduled for that procedure. The total levels successfully blocked were 3 levels, both sides. POST-PROCEDURE COURSE:  The patient was escorted from the procedure suite in satisfactory condition and recovered per facility protocol based on the type of procedure performed and/or the sedation utilized. The patient did not experience any adverse events and remained hemodynamically stable during the post-procedure period.       DISCHARGE NOTE:  Upon discharge, the patient was able to tolerate fluids and was in no acute distress. The patient was oriented to person, place and time and vital signs were stable. Appropriate post-procedure instructions were provided and explained to the patient in detail and all questions were answered.     Martine Jones MD 10/30/2017 1:43 PM

## 2017-10-31 ENCOUNTER — TELEPHONE (OUTPATIENT)
Dept: PAIN MANAGEMENT | Age: 62
End: 2017-10-31

## 2017-10-31 NOTE — TELEPHONE ENCOUNTER
Mr. Darci Rick was contacted for follow-up status post Bilateral diagnostic lumbar medial branch blocks via the  L3/L4,  L4/L5,  L5/S1 medial branch nerves on October 30, 2017.  He reports:    Pre-procedure numerical pain score: 5/10  Post-procedure numerical pain score one week after: 2/10  Duration of relief post-procedure (if applicable): 7 hrs  Improvement in functional activities (if applicable): YES  Percentage of overall improvement: 70%    COMMENTS:

## 2017-11-10 RX ORDER — DIAZEPAM 5 MG/1
10 TABLET ORAL ONCE
Status: CANCELLED | OUTPATIENT
Start: 2017-11-13 | End: 2017-11-13

## 2017-11-10 RX ORDER — SODIUM CHLORIDE 0.9 % (FLUSH) 0.9 %
5-10 SYRINGE (ML) INJECTION AS NEEDED
Status: CANCELLED | OUTPATIENT
Start: 2017-11-10

## 2017-11-13 ENCOUNTER — APPOINTMENT (OUTPATIENT)
Dept: GENERAL RADIOLOGY | Age: 62
End: 2017-11-13
Attending: PHYSICAL MEDICINE & REHABILITATION
Payer: MEDICARE

## 2017-11-13 ENCOUNTER — OFFICE VISIT (OUTPATIENT)
Dept: ORTHOPEDIC SURGERY | Age: 62
End: 2017-11-13

## 2017-11-13 ENCOUNTER — HOSPITAL ENCOUNTER (OUTPATIENT)
Age: 62
Setting detail: OUTPATIENT SURGERY
Discharge: HOME | End: 2017-11-13
Attending: PHYSICAL MEDICINE & REHABILITATION | Admitting: PHYSICAL MEDICINE & REHABILITATION
Payer: MEDICARE

## 2017-11-13 VITALS
HEART RATE: 87 BPM | DIASTOLIC BLOOD PRESSURE: 74 MMHG | HEIGHT: 69 IN | SYSTOLIC BLOOD PRESSURE: 96 MMHG | OXYGEN SATURATION: 94 % | BODY MASS INDEX: 39.99 KG/M2 | TEMPERATURE: 97.9 F | RESPIRATION RATE: 18 BRPM | WEIGHT: 270 LBS

## 2017-11-13 VITALS — BODY MASS INDEX: 39.99 KG/M2 | WEIGHT: 270 LBS | RESPIRATION RATE: 16 BRPM | HEIGHT: 69 IN

## 2017-11-13 DIAGNOSIS — G89.29 CHRONIC BILATERAL LOW BACK PAIN WITHOUT SCIATICA: ICD-10-CM

## 2017-11-13 DIAGNOSIS — M54.50 CHRONIC BILATERAL LOW BACK PAIN WITHOUT SCIATICA: ICD-10-CM

## 2017-11-13 DIAGNOSIS — M47.816 LUMBAR FACET ARTHROPATHY: Primary | ICD-10-CM

## 2017-11-13 DIAGNOSIS — N28.1 BILATERAL RENAL CYSTS: ICD-10-CM

## 2017-11-13 PROCEDURE — 74011250636 HC RX REV CODE- 250/636

## 2017-11-13 PROCEDURE — 74011000250 HC RX REV CODE- 250: Performed by: PHYSICAL MEDICINE & REHABILITATION

## 2017-11-13 PROCEDURE — 76010000009 HC PAIN MGT 0 TO 30 MIN PROC: Performed by: PHYSICAL MEDICINE & REHABILITATION

## 2017-11-13 PROCEDURE — 74011250636 HC RX REV CODE- 250/636: Performed by: PHYSICAL MEDICINE & REHABILITATION

## 2017-11-13 PROCEDURE — 74011000250 HC RX REV CODE- 250

## 2017-11-13 PROCEDURE — 77030003672 HC NDL SPN HALY -A: Performed by: PHYSICAL MEDICINE & REHABILITATION

## 2017-11-13 PROCEDURE — 74011250637 HC RX REV CODE- 250/637: Performed by: PHYSICAL MEDICINE & REHABILITATION

## 2017-11-13 RX ORDER — DIAZEPAM 5 MG/1
10 TABLET ORAL ONCE
Status: COMPLETED | OUTPATIENT
Start: 2017-11-13 | End: 2017-11-13

## 2017-11-13 RX ORDER — SODIUM CHLORIDE 0.9 % (FLUSH) 0.9 %
5-10 SYRINGE (ML) INJECTION AS NEEDED
Status: DISCONTINUED | OUTPATIENT
Start: 2017-11-13 | End: 2017-11-13 | Stop reason: HOSPADM

## 2017-11-13 RX ORDER — DIAZEPAM 5 MG/1
10 TABLET ORAL ONCE
Status: DISCONTINUED | OUTPATIENT
Start: 2017-11-13 | End: 2017-11-13

## 2017-11-13 RX ORDER — LIDOCAINE HYDROCHLORIDE 10 MG/ML
INJECTION, SOLUTION EPIDURAL; INFILTRATION; INTRACAUDAL; PERINEURAL AS NEEDED
Status: DISCONTINUED | OUTPATIENT
Start: 2017-11-13 | End: 2017-11-13 | Stop reason: HOSPADM

## 2017-11-13 RX ORDER — ROPIVACAINE HYDROCHLORIDE 2 MG/ML
INJECTION, SOLUTION EPIDURAL; INFILTRATION; PERINEURAL AS NEEDED
Status: DISCONTINUED | OUTPATIENT
Start: 2017-11-13 | End: 2017-11-13 | Stop reason: HOSPADM

## 2017-11-13 RX ADMIN — DIAZEPAM 10 MG: 5 TABLET ORAL at 09:54

## 2017-11-13 NOTE — INTERVAL H&P NOTE
H&P Update:  Kentrell Bojorquez was seen and examined. History and physical has been reviewed. The patient has been examined.  There have been no significant clinical changes since the completion of the originally dated History and Physical.    Signed By: Bridgette Richards MD     November 13, 2017 9:20 AM

## 2017-11-13 NOTE — PROGRESS NOTES
[facility]  [address] [place], [facility] [address]  Phone: [phone]  Fax: [phone]      ASSESSMENT   Diagnoses and all orders for this visit:    1. Lumbar facet arthropathy    2. Chronic bilateral low back pain without sciatica    3. Bilateral renal cysts         IMPRESSION AND PLAN:  Avril Crawford is a 58 y.o. male with history of lumbar pain. He presents to the office today for renal ultrasound follow up that demonstrated multiple renal cysts and a right lower pole renal stone. Pt underwent a bilateral L3-L5 lumbar RFA trial with Dr. Niko Poe on 10/30/2017 and experienced 4-6 hours of relief. 1) Pt was given information on lumbar arthritis exercises. 2) He will proceed with his second lumbar RFA trail with Dr. Niko Poe today. 3) Mr. Valeria Eden has a reminder for a \"due or due soon\" health maintenance. I have asked that he contact his primary care provider, Flora Jacobs MD, for follow-up on this health maintenance. 4)  demonstrated consistency with prescribing. 5) Pt will follow-up in 3 months. HISTORY OF PRESENT ILLNESS:  Avril Crawford is a 58 y.o. male with history of lumbar pain. He presents to the office today for renal ultrasound follow up. Pt underwent a bilateral L3-L5 lumbar RFA trial with Dr. Niko Poe on 10/30/2017 and experienced 4-6 hours of relief. He will follow up with Dr. Niko Poe today for his second RFA trial injection. Of note, patients renal ultrasound demonstrated multiple renal cysts and a right lower pole renal stone. Pt at this time desires to continue with current care.     Pain Scale: /10    PCP: Flora Jacobs MD       Past Medical History:   Diagnosis Date    Arthritis     Headache     Hypercholesterolemia     Hypertension     PTSD (post-traumatic stress disorder)     TBI (traumatic brain injury) ([facility] [address]         Social History     Social History    Marital status:      Spouse name: N/A    Number of children: N/A    Years of education: N/A     Occupational History    Not on file. Social History Main Topics    Smoking status: Never Smoker    Smokeless tobacco: Not on file    Alcohol use No    Drug use: Not on file    Sexual activity: Not on file     Other Topics Concern    Not on file     Social History Narrative       Current Outpatient Prescriptions   Medication Sig Dispense Refill    diclofenac (VOLTAREN) 1 % gel Apply  to affected area four (4) times daily. Apply to both knees qid as directed 5 Each 3    BOTOX 200 unit injection       doxycycline (MONODOX) 100 mg capsule Take 100 mg by mouth daily.  amLODIPine (NORVASC) 10 mg tablet Take 1 Tab by mouth daily. 90 Tab 3    celecoxib (CELEBREX) 200 mg capsule Take 1 Cap by mouth two (2) times a day. 180 Cap 0    gabapentin (NEURONTIN) 300 mg capsule Take 300 mg by mouth three (3) times daily.  amoxicillin 500 mg tab Take  by mouth.  cinnamon bark (CINNAMON) 500 mg cap Take  by mouth.  DULoxetine (CYMBALTA) 30 mg capsule Take 30 mg by mouth daily.  raNITIdine (ZANTAC) 150 mg tablet Take 150 mg by mouth two (2) times a day.  sertraline (ZOLOFT) 100 mg tablet Take  by mouth daily.  tamsulosin (FLOMAX) 0.4 mg capsule Take 0.4 mg by mouth daily.  traZODone (DESYREL) 150 mg tablet Take 150 mg by mouth nightly.  multivit with min-folic acid (ONE-A-DAY MEN VITACRAVES) 200 mcg chew Take  by mouth.  telmisartan-hydroCHLOROthiazide (MICARDIS HCT) 80-12.5 mg per tablet Take 1 Tab by mouth daily. 90 Tab 1    meclizine (ANTIVERT) 25 mg tablet Take 1 Tab by mouth daily. 90 Tab 1    Omeprazole delayed release (PRILOSEC D/R) 20 mg tablet Take 1 Tab by mouth daily. 90 Tab 1    sildenafil citrate (VIAGRA) 100 mg tablet Take 1 Tab by mouth as needed. Indications: Erectile Dysfunction 24 Tab 1    cetirizine (ZYRTEC) 10 mg tablet Take  by mouth.  DOCOSAHEXANOIC ACID/EPA (FISH OIL PO) Take  by mouth.       finasteride (PROSCAR) 5 mg tablet Take 1 Tab by mouth daily. 90 Tab 1     Facility-Administered Medications Ordered in Other Visits   Medication Dose Route Frequency Provider Last Rate Last Dose    sodium chloride (NS) flush 5-10 mL  5-10 mL IntraVENous PRN Benjamin Arguello MD        lidocaine (PF) (XYLOCAINE) 10 mg/mL (1 %) injection    PRN Benjamin Arguello MD   3 mL at 11/13/17 1100    ropivacaine (NAROPIN) injection    PRN Benjamin Arguello MD   3 mL at 11/13/17 1100       No Known Allergies      REVIEW OF SYSTEMS    Constitutional: Negative for fever, chills, or weight change. Respiratory: Negative for cough or shortness of breath. Cardiovascular: Negative for chest pain or palpitations. Gastrointestinal: Negative for acid reflux, change in bowel habits, or constipation. Genitourinary: Negative for dysuria and flank pain. Musculoskeletal: Positive for lumbar pain. Skin: Negative for rash. Neurological: Negative for headaches, dizziness, or numbness. Endo/Heme/Allergies: Negative for increased bruising. Psychiatric/Behavioral: Negative for difficulty with sleep. PHYSICAL EXAMINATION  Visit Vitals    Resp 16    Ht 5' 9\" (1.753 m)    Wt 270 lb (122.5 kg)    BMI 39.87 kg/m2       Constitutional: Awake, alert, and in no acute distress. Neurological: 1+ symmetrical DTRs in the lower extremities. Sensation to light touch is intact. Skin: warm, dry, and intact. Musculoskeletal: Tenderness to palpation in the lower lumbar region. Moderate pain with extension and axial loading. No pain with internal or external rotation of his hips. Negative straight leg raise bilaterally.      Hip Flex  Quads Hamstrings Ankle DF EHL Ankle PF   Right +4/5 +4/5 +4/5 +4/5 +4/5 +4/5   Left +4/5 +4/5 +4/5 +4/5 +4/5 +4/5     IMAGING:    Lumbar spine MRI from 09/14/2017 was personally reviewed with the patient and demonstrated:    Results from [place] encounter on 09/14/17   MRI LUMB SPINE WO CONT Narrative EXAM:  MRI LUMB SPINE WO CONT    INDICATION:   Degenerative disc disease lumbar, lumbar facet arthropathy, muscle  spasm of back    COMPARISON: None    TECHNIQUE:   MR imaging of the lumbar spine was performed with sagittal T1, T2, STIR;  axial  T1, T2. Contrast was not administered. FINDINGS:  Normal lumbar spine alignment. Vertebral body heights maintained. Disc space  heights are maintained and hydrated. Normal bone marrow signal. Normal conus  medullaris signal terminating at T12/L1 level. There is developmental narrowing  of the lumbar spinal canal due to short pedicles. Visualized lower thoracic levels T11-L1 demonstrate unremarkable discs with  patent canal and foramina. Correlation of sagittal and axial images demonstrates the following:    T12/L1: Unremarkable disc. No central canal or foraminal stenosis. Loletta Nunnery L1/2:  Unremarkable disc. No central canal or foraminal stenosis. L2/3:  Unremarkable disc. Mild facet and ligamentum hypertrophy. Mild central  canal stenosis. No foraminal stenosis. L3/4:  Minimal disc bulge. Mild facet and ligamentum hypertrophy. Mild central  canal stenosis. Mild foraminal stenosis    L4 level: At the level of L4 vertebral body there is abundant epidural fat and  moderate narrowing of the thecal sac. L4/5:  Mild disc bulge with central disc protrusion. Moderate facet and  ligamentum hypertrophy. Moderate to severe central canal stenosis. Moderate  foraminal stenosis. L5 level: At the level of L5 vertebral body, there is abundant epidural fat and  moderate to severe narrowing of the thecal sac. L5/S1:  Mild disc bulge. Moderate facet and ligamentum hypertrophy. Prominent  epidural fat. Lack of CSF and the tapering of the thecal sac. Moderate foraminal  stenosis. Incompletely evaluated bilateral T2 hyperintense lesions in the kidneys up to 2  cm. Impression IMPRESSION:      1.  Mild/moderate degenerative changes in the lower lumbar spine superimposed on  developmentally small central canal and prominent epidural  fat resulting in  moderate to severe compression of the thecal sac from L3/4 disc level to L5/S1  as discussed. Moderate foraminal stenosis at L4/5 and L5/S1. 2. Bilateral renal lesions up to 2 cm, likely cysts and can be evaluated with  ultrasound. Renal Ultrasound from 10/12/2017 was personally reviewed with the patient and demonstrated:    Results from [place] encounter on 10/12/17   US RETROPERITONEUM COMP   Narrative PROCEDURE:  Ultrasound Retroperitoneal Scan. CPT code 89222. INDICATION:  T2 high signal structures in the kidneys noted on recent MR.    COMPARISON:  MR lumbar spine 9/14/17. FINDINGS:    Right kidney size:  10.1 x 6.0 x 6.0 cm. Left kidney size:  10.6 x 6.5 x 5.5 cm. Spleen size:  9.1 x 4.0 x 8.6 cm. Multiple renal cortical hypoechoic or anechoic structures are identified in both  kidneys. The largest anechoic structure in the right kidney measures about 2.5  x 2.7 x 2.3 cm, located at the mid interpolar region. Another small exophytic  anechoic structure measuring 0.9 x 0.9 x 1.0 cm, located at the mid interpolar  region. At the lower pole, an exophytic 1.3 x 1.4 x 1.2 cm cyst is observed. Near the lower pole, an echogenic posterior shadowing 1.1 x 0.8 x 0.4 cm ovoid  structure is observed, suggestive of an intrarenal stone. In the left kidney, a circumscribed 2.4 x 2.0 x 1.7 cm cystic structure is  observed at the mid interpolar region, with a barely detectable thin internal  septation. At the mid to upper interpolar region, an exophytic 1.0 x 0.8 x 0.8  cm cyst is identified. No hydronephrosis is detected. No renal pelviectasis. The parenchymal  echogenicity is without significant alteration to suggest medical renal disease. The bladder appears grossly unremarkable. The prevoid volume is estimated at  487 mL. The post void volume is estimated at 80 mL.   Prostate is estimated to  be about 4.6 x 4.0 x 3.8 cm. No free intraperitoneal fluid is seen. The visualized portions of the liver and spleen are unremarkable. The inferior  vena cava and aorta are not well visualized. Impression IMPRESSION:    1.  Multiple renal cysts. The left kidney mid interpolar region cyst appears to  show a minimally complex feature of a thin internal septation. No  postobstructive changes in the kidneys. 2.  Right kidney lower pole stone. 3.  Mild postvoid residual.        Written by Matthew Kapoor, as dictated by Aung Strange MD.  I, Dr. Aung Strange confirm that all documentation is accurate.

## 2017-11-13 NOTE — PROCEDURES
[facility]    DIAG LUMBAR FACET INJECTIONS  PROCEDURE REPORT      PATIENT:  John Melara  YOB: 1955  DATE OF SERVICE:  11/13/2017  [facility]    PRE-PROCEDURE DIAGNOSIS:  See Above    POST-PROCEDURE DIAGNOSIS:  See Above                PROCEDURE:  1. Bilateral diagnostic lumbar medial branch blocks via the  L3/L4,  L4/L5,  L5/S1 medial branch nerves ( 50447, 50;  18579, 50;  86925, 50 )  2. Fluoroscopic needle guidance (82966)      LEVELS TREATED:  Bilateral sided  L3/L4,  L4/L5,  L5/S1 medial branch nerves     ANESTHESIA:  See Medication Administration Record    COMPLICATIONS: None. PHYSICIAN:  Kelley Guillaume MD    PRE-PROCEDURE NOTE:  Pre-procedural assessment of the patient was performed including a limited history and physical examination. The details of the procedure were discussed with the patient, including the risks, benefits and alternative options and an informed consent was obtained. The patients NPO status, if necessary for the specific procedure and/or administration of moderate intravenous sedation, if utilized, and availability of a responsible adult to escort the patient following the procedure were confirmed. PROCEDURE NOTE:  The patient was brought to the procedure suite and positioned on the fluoroscopy table in the prone position. Physiologic monitors were applied and supplemental oxygen was administered via nasal cannula. The skin was prepped in the standard surgical fashion and sterile drapes were applied over the procedure site. Please refer to the Flowsheet for documentation of the patients vital signs and the Medication Administration Record for any oral and/or intravenous sedation administered prior to or during the procedure. 1% Lidocaine was utilized for local anesthesia.  Under AP fluoroscopic guidance a 25-gauge, 3-1/2 inch short bevel spinal needle was advanced to the junction of the superior articular process and transverse process of each vertebral level immediately inferior to the above-mentioned dorsal rami medial branch nerves. A needle was also placed along the sacral ala to block the L5 medial branch nerve. After all needles were placed,  0.5 mL of 0.2% ropivacaine was injected at each location after the negative aspiration of blood, air or CSF. The needles were removed and the stilets were replaced. The procedure was performed on the contralateral side in the same fashion and at the same levels using the same volume of local anesthetic following negative aspiration of blood, air or CSF. The needles were removed intact. The area was thoroughly cleaned and sterile bandages applied as necessary. The patient tolerated the procedure well and vital signs remained stable throughout the procedure. The patient was assessed immediately following the procedure and was noted to have greater than 50% reduction in pain (a reduction from 7/10 to 2/10 in severity of lumbar pain). Based on these results, the patient is considered an appropriate candidate for radiofrequency ablation of the above-mentioned medial branch nerves and will be scheduled for that procedure. The total levels successfully blocked were 3 levels, both sides. POST-PROCEDURE COURSE:  The patient was escorted from the procedure suite in satisfactory condition and recovered per facility protocol based on the type of procedure performed and/or the sedation utilized. The patient did not experience any adverse events and remained hemodynamically stable during the post-procedure period. DISCHARGE NOTE:  Upon discharge, the patient was able to tolerate fluids and was in no acute distress. The patient was oriented to person, place and time and vital signs were stable.  Appropriate post-procedure instructions were provided and explained to the patient in detail and all questions were answered.     Martine Jones MD 11/13/2017 2:17 PM

## 2017-11-13 NOTE — DISCHARGE INSTRUCTIONS
[facility]      Post Procedures Instructions    *Resume Diet and Activity as tolerated. Rest for the remainder of the day. *You may fell worse before you feel better as the numbing medications wear off before the steroids take effect if used for your procedures. *Do not use affected extremity until numbness or loss of sensation has completely resolved without assistance. *DO NOT DRIVE, operate machinery/heavey equipment for 24 hours. *DO NOT DRINK ALCOHOL for 24 hours as it may interact with the sedation if you received it and also thins your blood and may cause you to bleed. *WAIT 24 hours before starting back ANY Blood thinning medications:   (Heparin, Coumadin, Warfarin, Lovenox, Plavix, Aggrenox)    *Resume Pre-Procedure Medications as prescribed except Blood Thinners unless directed by your Physician or Cardiologist.     *Avoid Hot tubs and Heating pad for 24 hours to prevent dissipation of medications, you may shower to remove bandages and remaining prep residue on the skin. * If you develop a Headache, drink plenty of fluids including beverages with caffeine (Coffee, Mt. Dew etc.) and rest.  If the headache persists longer than 24 hoursor intensifies - Please call [facility] ([facility]) [phone]    * If you are DIABETIC, check your blood sugar three times a day for the next three days, the steroids will increase your blood sugar. If your blood sugar is greater than 400 have someone drive you to the nearest [address]. * If you experience any of the following problems, call the [facility] [phone] between 8:00 am - 4:30pm or After Hours [phone].     Shortness of breath    Fever of 101 F or higher    Nausea / Vomiting (not normal to you)    Increasing stiffness in the neck    Weakness or numbness in the arms or legs that is not resolving    Prolonged and increasing pain > than 4 days    ANYTHING OUT of the ORDINARY TO YOU    If YOU are experiencing a severe reaction / complication that you have never had before post procedure, call 911 or go to the nearest emergency room! All patients must have a  for transportation [place] regardless if you do or do not receive sedation. DISCHARGE SUMMARY from Nurse      PATIENT INSTRUCTIONS:    After Oral  or intravenous sedation, for 24 hours or while taking prescription Narcotics:  · Limit your activities  · Do not drive and operate hazardous machinery  · Do not make important personal or business decisions  · Do  not drink alcoholic beverages  · If you have not urinated within 8 hours after discharge, please contact your surgeon on call. Report the following to your surgeon:  · Excessive pain, swelling, redness or odor of or around the surgical area  · Temperature over 101  · Nausea and vomiting lasting longer than 4 hours or if unable to take medications  · Any signs of decreased circulation or nerve impairment to extremity: change in color, persistent  numbness, tingling, coldness or increase pain  · Any questions        What to do at Home:  Recommended activity: Activity as tolerated, NO DRIVING FOR 24 Hours post injection          *  Please give a list of your current medications to your Primary Care Provider. *  Please update this list whenever your medications are discontinued, doses are      changed, or new medications (including over-the-counter products) are added. *  Please carry medication information at all times in case of emergency situations. These are general instructions for a healthy lifestyle:    No smoking/ No tobacco products/ Avoid exposure to second hand smoke    Surgeon General's Warning:  Quitting smoking now greatly reduces serious risk to your health.     Obesity, smoking, and sedentary lifestyle greatly increases your risk for illness    A healthy diet, regular physical exercise & weight monitoring are important for maintaining a healthy lifestyle    You may be retaining fluid if you have a history of heart failure or if you experience any of the following symptoms:  Weight gain of 3 pounds or more overnight or 5 pounds in a week, increased swelling in our hands or feet or shortness of breath while lying flat in bed. Please call your doctor as soon as you notice any of these symptoms; do not wait until your next office visit. Recognize signs and symptoms of STROKE:    F-face looks uneven    A-arms unable to move or move unevenly    S-speech slurred or non-existent    T-time-call 911 as soon as signs and symptoms begin-DO NOT go       Back to bed or wait to see if you get better-TIME IS BRAIN.

## 2017-11-13 NOTE — PATIENT INSTRUCTIONS

## 2017-11-13 NOTE — MR AVS SNAPSHOT
Visit Information Date & Time Provider Department Dept. Phone Encounter #  
 11/13/2017  7:50 AM Blanquita Patterson MD [facility] [phone] [id number] Follow-up Instructions Return in about 3 months (around 2/13/2018) for RFA follow up. Your Appointments 12/28/2017  8:30 AM  
Follow Up with Margaret Holloway MD  
[facility] Appt Note: 3mo f/u  
 [address] [facility] [address] [place] [address]  
[phone]  
  
   
 [place] [id number] [place] [address] Upcoming Health Maintenance Date Due COLONOSCOPY 10/7/2020 DTaP/Tdap/Td series (2 - Td) 6/29/2027 Allergies as of 11/13/2017  Review Complete On: 11/13/2017 By: Cass Andrews LPN No Known Allergies Current Immunizations  Never Reviewed No immunizations on file. Not reviewed this visit You Were Diagnosed With   
  
 Codes Comments Lumbar facet arthropathy    -  Primary ICD-10-CM: M12.88 ICD-9-CM: 325. 3 Chronic bilateral low back pain without sciatica     ICD-10-CM: M54.5, G89.29 ICD-9-CM: 724.2, 338.29 Bilateral renal cysts     ICD-10-CM: N28.1 ICD-9-CM: 753.10 Vitals Resp Height(growth percentile) Weight(growth percentile) BMI Smoking Status 16 5' 9\" (1.753 m) 270 lb (122.5 kg) 39.87 kg/m2 Never Smoker BMI and BSA Data Body Mass Index Body Surface Area  
 39.87 kg/m 2 2.44 m 2 Preferred Pharmacy Pharmacy Name Phone [facility], [address] [facility] [phone] Your Updated Medication List  
  
   
This list is accurate as of: 11/13/17  8:22 AM.  Always use your most recent med list. amLODIPine 10 mg tablet Commonly known as:  Danisha Pace Take 1 Tab by mouth daily. amoxicillin 500 mg Tab Take  by mouth. BOTOX 200 unit injection Generic drug:  onabotulinumtoxinA celecoxib 200 mg capsule Commonly known as:  CELEBREX Take 1 Cap by mouth two (2) times a day. cetirizine 10 mg tablet Commonly known as:  ZYRTEC Take  by mouth. CINNAMON 500 mg Cap Generic drug:  cinnamon bark Take  by mouth. diclofenac 1 % Gel Commonly known as:  VOLTAREN Apply  to affected area four (4) times daily. Apply to both knees qid as directed  
  
 doxycycline 100 mg capsule Commonly known as:  Gume Sabot Take 100 mg by mouth daily. DULoxetine 30 mg capsule Commonly known as:  CYMBALTA Take 30 mg by mouth daily. finasteride 5 mg tablet Commonly known as:  PROSCAR Take 1 Tab by mouth daily. FISH OIL PO Take  by mouth.  
  
 gabapentin 300 mg capsule Commonly known as:  NEURONTIN Take 300 mg by mouth three (3) times daily. meclizine 25 mg tablet Commonly known as:  ANTIVERT Take 1 Tab by mouth daily. Omeprazole delayed release 20 mg tablet Commonly known as:  PRILOSEC D/R Take 1 Tab by mouth daily. ONE-A-DAY MEN VITACRAVES 200 mcg Chew Generic drug:  multivit with min-folic acid Take  by mouth. raNITIdine 150 mg tablet Commonly known as:  ZANTAC Take 150 mg by mouth two (2) times a day. sertraline 100 mg tablet Commonly known as:  ZOLOFT Take  by mouth daily. sildenafil citrate 100 mg tablet Commonly known as:  VIAGRA Take 1 Tab by mouth as needed. Indications: Erectile Dysfunction  
  
 tamsulosin 0.4 mg capsule Commonly known as:  FLOMAX Take 0.4 mg by mouth daily. telmisartan-hydroCHLOROthiazide 80-12.5 mg per tablet Commonly known as:  MICARDIS HCT Take 1 Tab by mouth daily. traZODone 150 mg tablet Commonly known as:  Cooper Oh Take 150 mg by mouth nightly. Follow-up Instructions Return in about 3 months (around 2/13/2018) for RFA follow up. Patient Instructions Low Back Arthritis: Exercises Your Care Instructions Here are some examples of typical rehabilitation exercises for your condition. Start each exercise slowly. Ease off the exercise if you start to have pain. Your doctor or physical therapist will tell you when you can start these exercises and which ones will work best for you. When you are not being active, find a comfortable position for rest. Some people are comfortable on the floor or a medium-firm bed with a small pillow under their head and another under their knees. Some people prefer to lie on their side with a pillow between their knees. Don't stay in one position for too long. Take short walks (10 to 20 minutes) every 2 to 3 hours. Avoid slopes, hills, and stairs until you feel better. Walk only distances you can manage without pain, especially leg pain. How to do the exercises Pelvic tilt 1. Lie on your back with your knees bent. 2. \"Brace\" your stomach-tighten your muscles by pulling in and imagining your belly button moving toward your spine. 3. Press your lower back into the floor. You should feel your hips and pelvis rock back. 4. Hold for 6 seconds while breathing smoothly. 5. Relax and allow your pelvis and hips to rock forward. 6. Repeat 8 to 12 times. Back stretches 1. Get down on your hands and knees on the floor. 2. Relax your head and allow it to droop. Round your back up toward the ceiling until you feel a nice stretch in your upper, middle, and lower back. Hold this stretch for as long as it feels comfortable, or about 15 to 30 seconds. 3. Return to the starting position with a flat back while you are on your hands and knees. 4. Let your back sway by pressing your stomach toward the floor. Lift your buttocks toward the ceiling. 5. Hold this position for 15 to 30 seconds. 6. Repeat 2 to 4 times. Follow-up care is a key part of your treatment and safety.  Be sure to make and go to all appointments, and call your doctor if you are having problems. It's also a good idea to know your test results and keep a list of the medicines you take. Where can you learn more? Go to http://neel-stone.info/. Enter P531 in the search box to learn more about \"Low Back Arthritis: Exercises. \" Current as of: March 21, 2017 Content Version: 11.4 © 7816-4589 [employer]. Care instructions adapted under license by [employer] (which disclaims liability or warranty for this information). If you have questions about a medical condition or this instruction, always ask your healthcare professional. [address] any warranty or liability for your use of this information. Introducing \[facility]\"" & HEALTH SERVICES! Sudhir Butcher introduces Extended [employer] Information [employer] patient portal. Now you can access parts of your medical record, email your doctor's office, and request medication refills online. 1. In your internet browser, go to [email or web address] 2. Click on the First Time User? Click Here link in the Sign In box. You will see the New Member Sign Up page. 3. Enter your Extended Care Information Network Access Code exactly as it appears below. You will not need to use this code after youve completed the sign-up process. If you do not sign up before the expiration date, you must request a new code. · Extended [facility] Information [employer] Access Code: [email or web address] Expires: 1/28/2018  7:21 AM 
 
4. Enter the last four digits of your Social Security Number (xxxx) and Date of Birth (mm/dd/yyyy) as indicated and click Submit. You will be taken to the next sign-up page. 5. Create a MyVerset ID. This will be your Extended Care Information Network login ID and cannot be changed, so think of one that is secure and easy to remember. 6. Create a Extended Care Information Network password. You can change your password at any time. 7. Enter your Password Reset Question and Answer. This can be used at a later time if you forget your password. 8. Enter your e-mail address.  You will receive e-mail notification when new information is available in Meshify. 9. Click Sign Up. You can now view and download portions of your medical record. 10. Click the Download Summary menu link to download a portable copy of your medical information. If you have questions, please visit the Frequently Asked Questions section of the M[employer] website. Remember, [employer] is NOT to be used for urgent needs. For medical emergencies, dial 911. Now available from your iPhone and Android! Please provide this summary of care documentation to your next provider. Your primary care clinician is listed as [id number] [facility]. If you have any questions after today's visit, please call [phone].

## 2017-11-16 ENCOUNTER — TELEPHONE (OUTPATIENT)
Dept: PAIN MANAGEMENT | Age: 62
End: 2017-11-16

## 2017-11-16 NOTE — TELEPHONE ENCOUNTER
Mr. Maren Melara was contacted for follow-up status post Bilateral diagnostic lumbar medial branch blocks via the  L3/L4,  L4/L5,  L5/S1 medial branch nerves on November 13, 2017.  He reports:    Pre-procedure numerical pain score: 5/10  Post-procedure numerical pain score one week after: 1/10  Duration of relief post-procedure (if applicable): 8 hrs  Improvement in functional activities (if applicable): Yes  Percentage of overall improvement: 80%    COMMENTS:

## 2017-12-08 RX ORDER — MIDAZOLAM HYDROCHLORIDE 1 MG/ML
.5-6 INJECTION, SOLUTION INTRAMUSCULAR; INTRAVENOUS
Status: CANCELLED | OUTPATIENT
Start: 2017-12-11

## 2017-12-08 RX ORDER — SODIUM CHLORIDE 0.9 % (FLUSH) 0.9 %
5-10 SYRINGE (ML) INJECTION AS NEEDED
Status: CANCELLED | OUTPATIENT
Start: 2017-12-11

## 2017-12-11 ENCOUNTER — HOSPITAL ENCOUNTER (OUTPATIENT)
Age: 62
Setting detail: OUTPATIENT SURGERY
Discharge: HOME | End: 2017-12-11
Attending: PHYSICAL MEDICINE & REHABILITATION | Admitting: PHYSICAL MEDICINE & REHABILITATION
Payer: MEDICARE

## 2017-12-11 ENCOUNTER — APPOINTMENT (OUTPATIENT)
Dept: GENERAL RADIOLOGY | Age: 62
End: 2017-12-11
Attending: PHYSICAL MEDICINE & REHABILITATION
Payer: MEDICARE

## 2017-12-11 VITALS
BODY MASS INDEX: 39.99 KG/M2 | HEIGHT: 69 IN | TEMPERATURE: 98.2 F | WEIGHT: 270 LBS | SYSTOLIC BLOOD PRESSURE: 118 MMHG | OXYGEN SATURATION: 95 % | DIASTOLIC BLOOD PRESSURE: 75 MMHG | HEART RATE: 99 BPM | RESPIRATION RATE: 16 BRPM

## 2017-12-11 PROCEDURE — 74011250636 HC RX REV CODE- 250/636: Performed by: PHYSICAL MEDICINE & REHABILITATION

## 2017-12-11 PROCEDURE — 77030020508 HC PD GRND GENRTR BAYL -A: Performed by: PHYSICAL MEDICINE & REHABILITATION

## 2017-12-11 PROCEDURE — 74011250636 HC RX REV CODE- 250/636

## 2017-12-11 PROCEDURE — 99152 MOD SED SAME PHYS/QHP 5/>YRS: CPT | Performed by: PHYSICAL MEDICINE & REHABILITATION

## 2017-12-11 PROCEDURE — 76010000009 HC PAIN MGT 0 TO 30 MIN PROC: Performed by: PHYSICAL MEDICINE & REHABILITATION

## 2017-12-11 PROCEDURE — 77030029505: Performed by: PHYSICAL MEDICINE & REHABILITATION

## 2017-12-11 PROCEDURE — 74011000250 HC RX REV CODE- 250: Performed by: PHYSICAL MEDICINE & REHABILITATION

## 2017-12-11 PROCEDURE — 74011000250 HC RX REV CODE- 250

## 2017-12-11 RX ORDER — SODIUM CHLORIDE 0.9 % (FLUSH) 0.9 %
5-10 SYRINGE (ML) INJECTION AS NEEDED
Status: DISCONTINUED | OUTPATIENT
Start: 2017-12-11 | End: 2017-12-11 | Stop reason: HOSPADM

## 2017-12-11 RX ORDER — FENTANYL CITRATE 50 UG/ML
INJECTION, SOLUTION INTRAMUSCULAR; INTRAVENOUS AS NEEDED
Status: DISCONTINUED | OUTPATIENT
Start: 2017-12-11 | End: 2017-12-11 | Stop reason: HOSPADM

## 2017-12-11 RX ORDER — MIDAZOLAM HYDROCHLORIDE 1 MG/ML
.5-6 INJECTION, SOLUTION INTRAMUSCULAR; INTRAVENOUS
Status: DISCONTINUED | OUTPATIENT
Start: 2017-12-11 | End: 2017-12-11 | Stop reason: HOSPADM

## 2017-12-11 RX ORDER — LIDOCAINE HYDROCHLORIDE 10 MG/ML
INJECTION, SOLUTION EPIDURAL; INFILTRATION; INTRACAUDAL; PERINEURAL AS NEEDED
Status: DISCONTINUED | OUTPATIENT
Start: 2017-12-11 | End: 2017-12-11 | Stop reason: HOSPADM

## 2017-12-11 RX ORDER — DEXAMETHASONE SODIUM PHOSPHATE 100 MG/10ML
INJECTION INTRAMUSCULAR; INTRAVENOUS AS NEEDED
Status: DISCONTINUED | OUTPATIENT
Start: 2017-12-11 | End: 2017-12-11 | Stop reason: HOSPADM

## 2017-12-11 RX ORDER — LIDOCAINE HYDROCHLORIDE 20 MG/ML
INJECTION, SOLUTION EPIDURAL; INFILTRATION; INTRACAUDAL; PERINEURAL AS NEEDED
Status: DISCONTINUED | OUTPATIENT
Start: 2017-12-11 | End: 2017-12-11 | Stop reason: HOSPADM

## 2017-12-11 RX ORDER — MIDAZOLAM HYDROCHLORIDE 1 MG/ML
INJECTION, SOLUTION INTRAMUSCULAR; INTRAVENOUS AS NEEDED
Status: DISCONTINUED | OUTPATIENT
Start: 2017-12-11 | End: 2017-12-11 | Stop reason: HOSPADM

## 2017-12-11 NOTE — PROCEDURES
[facility]    THERMOCOAGULATION OF LUMBAR MEDIAL BRANCH  PROCEDURE REPORT      PATIENT:  Ignacio Huynh  YOB: 1955  DATE OF SERVICE:  12/11/2017  SITE:  [facility] Special Procedures Suite    PRE-PROCEDURE DIAGNOSIS:  See Above    POST-PROCEDURE DIAGNOSIS:  See Above    PROCEDURE:      1. Right radiofrequency thermocoagulation of lumbar medial branch nerves,  L3/L4, L4/L5, L5/S1 (26177, 64636 x2)  2. Fluoroscopic needle guidance (spinal) (94169)  3. Supervision of moderate sedation (87735)  4. Additional 15 minutes of supervised moderate sedation (26997)    ANESTHESIA:   Local with moderate IV sedation. See Medication Administration Record for specific medications and dosage. COMPLICATIONS: None. PHYSICIAN:  Tesfaye Almanza MD    PRE-PROCEDURE NOTE:  Pre-procedural assessment of the patient was performed including a limited history and physical examination. The details of the procedure were discussed with the patient, including the risks, benefits and alternative options and an informed consent was obtained. The patients NPO status, if necessary for the specific procedure and/or administration of moderate intravenous sedation, if utilized, and availability of a responsible adult to escort the patient following the procedure were confirmed. A peripheral intravenous cannula was placed without difficulty and lactated Ringers solution administered. See nursing notes for details. PROCEDURE NOTE:  The patient was brought to the procedure suite and positioned on the fluoroscopy table in the prone position. Physiologic monitors were applied and supplemental oxygen was administered via nasal cannula. The skin was prepped in the standard surgical fashion and sterile drapes were applied over the procedure site.  Please refer to the Flowsheet for documentation of the patients vital signs and the Medication Administration Record for any oral and/or intravenous sedation administered prior to or during the procedure. 1% Lidocaine was utilized for local anesthesia. Under 10-15 degree ipsilateral oblique fluoroscopic guidance a 15cm 18gauge radiofrequency needle with a 10 mm curved active tip was advanced to the junction of the superior articular process and transverse process of each vertebral level immediately inferior to the above-mentioned dorsal rami medial branch nerves. Under AP fluoroscopic guidance, a similar needle was then placed over the superior margin of the sacral ala to thermocoagulate the L5 medial branch nerve. After each individual needle was placed, sensory and motor testing, at 50 Hz and 2 Hz, respectively, were performed which elicited ipsilateral deep local back discomfort without evidence of motor stimulation in the ipsilateral gluteal muscles or extremity. Following this, 1 mL of lidocaine 2% was injected after the negative aspiration of blood, air or CSF. Final correct needle placement was then confirmed by viewing each needle in AP and lateral fluoroscopic views in addition to repeat sensory and motor testing which, again, elicited ipsilateral deep local back discomfort without evidence of motor stimulation in the ipsilateral gluteal muscles or extremity. Medial branch nerve radiofrequency thermocoagulation was then performed at each level for 120 seconds at 80° centigrade x 1 cycle. Following this, 1/2ml to 1ml of a mixture of lidocaine 1% admixed with dexamethasone 5mg [10mg/ml] was injected through each radiofrequency needle after negative aspiration and before removing each needle. Then, all needles were removed intact. The area was thoroughly cleaned and sterile bandages applied as necessary. The patient tolerated the procedure well without complication and the vital signs remained stable throughout the procedure.     POST-PROCEDURE COURSE:   The patient was escorted from the procedure suite in satisfactory condition and recovered per facility protocol based on the type of procedure performed and/or the sedation utilized. The patient did not experience any adverse events and remained hemodynamically stable during the post-procedure period. DISCHARGE NOTE:  Upon discharge, the patient was able to tolerate fluids and was in no acute distress. The patient was oriented to person, place and time and vital signs were stable. Appropriate post-procedure instructions were provided and explained to the patient in detail and all questions were answered.                     Aurelio Penaloza MD 12/11/2017 9:25 AM

## 2017-12-11 NOTE — H&P (VIEW-ONLY)
[facility]  [address] [place], [facility] [address]  Phone: [phone]  Fax: [phone]      ASSESSMENT   Diagnoses and all orders for this visit:    1. Lumbar facet arthropathy    2. Chronic bilateral low back pain without sciatica    3. Bilateral renal cysts         IMPRESSION AND PLAN:  Meghan Paiz is a 58 y.o. male with history of lumbar pain. He presents to the office today for renal ultrasound follow up that demonstrated multiple renal cysts and a right lower pole renal stone. Pt underwent a bilateral L3-L5 lumbar RFA trial with Dr. Kendell Torres on 10/30/2017 and experienced 4-6 hours of relief. 1) Pt was given information on lumbar arthritis exercises. 2) He will proceed with his second lumbar RFA trail with Dr. Kendell Torres today. 3)  [facility] has a reminder for a \"due or due soon\" health maintenance. I have asked that he contact his primary care provider, Eliezer Young MD, for follow-up on this health maintenance. 4)  demonstrated consistency with prescribing. 5) Pt will follow-up in 3 months. HISTORY OF PRESENT ILLNESS:  Meghan Paiz is a 58 y.o. male with history of lumbar pain. He presents to the office today for renal ultrasound follow up. Pt underwent a bilateral L3-L5 lumbar RFA trial with Dr. Kendell Torres on 10/30/2017 and experienced 4-6 hours of relief. He will follow up with Dr. Kendell Torres today for his second RFA trial injection. Of note, patients renal ultrasound demonstrated multiple renal cysts and a right lower pole renal stone. Pt at this time desires to continue with current care.     Pain Scale: /10    PCP: Eliezer Young MD       Past Medical History:   Diagnosis Date    Arthritis     Headache     Hypercholesterolemia     Hypertension     PTSD (post-traumatic stress disorder)     TBI (traumatic brain injury) ([facility] [address]         Social History     Social History    Marital status:      Spouse name: N/A    Number of children: N/A    Years of education: N/A     Occupational History    Not on file. Social History Main Topics    Smoking status: Never Smoker    Smokeless tobacco: Not on file    Alcohol use No    Drug use: Not on file    Sexual activity: Not on file     Other Topics Concern    Not on file     Social History Narrative       Current Outpatient Prescriptions   Medication Sig Dispense Refill    diclofenac (VOLTAREN) 1 % gel Apply  to affected area four (4) times daily. Apply to both knees qid as directed 5 Each 3    BOTOX 200 unit injection       doxycycline (MONODOX) 100 mg capsule Take 100 mg by mouth daily.  amLODIPine (NORVASC) 10 mg tablet Take 1 Tab by mouth daily. 90 Tab 3    celecoxib (CELEBREX) 200 mg capsule Take 1 Cap by mouth two (2) times a day. 180 Cap 0    gabapentin (NEURONTIN) 300 mg capsule Take 300 mg by mouth three (3) times daily.  amoxicillin 500 mg tab Take  by mouth.  cinnamon bark (CINNAMON) 500 mg cap Take  by mouth.  DULoxetine (CYMBALTA) 30 mg capsule Take 30 mg by mouth daily.  raNITIdine (ZANTAC) 150 mg tablet Take 150 mg by mouth two (2) times a day.  sertraline (ZOLOFT) 100 mg tablet Take  by mouth daily.  tamsulosin (FLOMAX) 0.4 mg capsule Take 0.4 mg by mouth daily.  traZODone (DESYREL) 150 mg tablet Take 150 mg by mouth nightly.  multivit with min-folic acid (ONE-A-DAY MEN VITACRAVES) 200 mcg chew Take  by mouth.  telmisartan-hydroCHLOROthiazide (MICARDIS HCT) 80-12.5 mg per tablet Take 1 Tab by mouth daily. 90 Tab 1    meclizine (ANTIVERT) 25 mg tablet Take 1 Tab by mouth daily. 90 Tab 1    Omeprazole delayed release (PRILOSEC D/R) 20 mg tablet Take 1 Tab by mouth daily. 90 Tab 1    sildenafil citrate (VIAGRA) 100 mg tablet Take 1 Tab by mouth as needed. Indications: Erectile Dysfunction 24 Tab 1    cetirizine (ZYRTEC) 10 mg tablet Take  by mouth.  DOCOSAHEXANOIC ACID/EPA (FISH OIL PO) Take  by mouth.       finasteride (PROSCAR) 5 mg tablet Take 1 Tab by mouth daily. 90 Tab 1     Facility-Administered Medications Ordered in Other Visits   Medication Dose Route Frequency Provider Last Rate Last Dose    sodium chloride (NS) flush 5-10 mL  5-10 mL IntraVENous PRN Kelley Guillaume MD        lidocaine (PF) (XYLOCAINE) 10 mg/mL (1 %) injection    PRN Kelley Guillaume MD   3 mL at 11/13/17 1100    ropivacaine (NAROPIN) injection    PRN Kelley Guillaume MD   3 mL at 11/13/17 1100       No Known Allergies      REVIEW OF SYSTEMS    Constitutional: Negative for fever, chills, or weight change. Respiratory: Negative for cough or shortness of breath. Cardiovascular: Negative for chest pain or palpitations. Gastrointestinal: Negative for acid reflux, change in bowel habits, or constipation. Genitourinary: Negative for dysuria and flank pain. Musculoskeletal: Positive for lumbar pain. Skin: Negative for rash. Neurological: Negative for headaches, dizziness, or numbness. Endo/Heme/Allergies: Negative for increased bruising. Psychiatric/Behavioral: Negative for difficulty with sleep. PHYSICAL EXAMINATION  Visit Vitals    Resp 16    Ht 5' 9\" (1.753 m)    Wt 270 lb (122.5 kg)    BMI 39.87 kg/m2       Constitutional: Awake, alert, and in no acute distress. Neurological: 1+ symmetrical DTRs in the lower extremities. Sensation to light touch is intact. Skin: warm, dry, and intact. Musculoskeletal: Tenderness to palpation in the lower lumbar region. Moderate pain with extension and axial loading. No pain with internal or external rotation of his hips. Negative straight leg raise bilaterally.      Hip Flex  Quads Hamstrings Ankle DF EHL Ankle PF   Right +4/5 +4/5 +4/5 +4/5 +4/5 +4/5   Left +4/5 +4/5 +4/5 +4/5 +4/5 +4/5     IMAGING:    Lumbar spine MRI from 09/14/2017 was personally reviewed with the patient and demonstrated:    Results from [place] encounter on 09/14/17   MRI LUMB SPINE WO CONT Narrative EXAM:  MRI LUMB SPINE WO CONT    INDICATION:   Degenerative disc disease lumbar, lumbar facet arthropathy, muscle  spasm of back    COMPARISON: None    TECHNIQUE:   MR imaging of the lumbar spine was performed with sagittal T1, T2, STIR;  axial  T1, T2. Contrast was not administered. FINDINGS:  Normal lumbar spine alignment. Vertebral body heights maintained. Disc space  heights are maintained and hydrated. Normal bone marrow signal. Normal conus  medullaris signal terminating at T12/L1 level. There is developmental narrowing  of the lumbar spinal canal due to short pedicles. Visualized lower thoracic levels T11-L1 demonstrate unremarkable discs with  patent canal and foramina. Correlation of sagittal and axial images demonstrates the following:    T12/L1: Unremarkable disc. No central canal or foraminal stenosis. Oris Ansari L1/2:  Unremarkable disc. No central canal or foraminal stenosis. L2/3:  Unremarkable disc. Mild facet and ligamentum hypertrophy. Mild central  canal stenosis. No foraminal stenosis. L3/4:  Minimal disc bulge. Mild facet and ligamentum hypertrophy. Mild central  canal stenosis. Mild foraminal stenosis    L4 level: At the level of L4 vertebral body there is abundant epidural fat and  moderate narrowing of the thecal sac. L4/5:  Mild disc bulge with central disc protrusion. Moderate facet and  ligamentum hypertrophy. Moderate to severe central canal stenosis. Moderate  foraminal stenosis. L5 level: At the level of L5 vertebral body, there is abundant epidural fat and  moderate to severe narrowing of the thecal sac. L5/S1:  Mild disc bulge. Moderate facet and ligamentum hypertrophy. Prominent  epidural fat. Lack of CSF and the tapering of the thecal sac. Moderate foraminal  stenosis. Incompletely evaluated bilateral T2 hyperintense lesions in the kidneys up to 2  cm. Impression IMPRESSION:      1.  Mild/moderate degenerative changes in the lower lumbar spine superimposed on  developmentally small central canal and prominent epidural  fat resulting in  moderate to severe compression of the thecal sac from L3/4 disc level to L5/S1  as discussed. Moderate foraminal stenosis at L4/5 and L5/S1. 2. Bilateral renal lesions up to 2 cm, likely cysts and can be evaluated with  ultrasound. Renal Ultrasound from 10/12/2017 was personally reviewed with the patient and demonstrated:    Results from [place] encounter on 10/12/17   US RETROPERITONEUM COMP   Narrative PROCEDURE:  Ultrasound Retroperitoneal Scan. CPT code 72630. INDICATION:  T2 high signal structures in the kidneys noted on recent MR.    COMPARISON:  MR lumbar spine 9/14/17. FINDINGS:    Right kidney size:  10.1 x 6.0 x 6.0 cm. Left kidney size:  10.6 x 6.5 x 5.5 cm. Spleen size:  9.1 x 4.0 x 8.6 cm. Multiple renal cortical hypoechoic or anechoic structures are identified in both  kidneys. The largest anechoic structure in the right kidney measures about 2.5  x 2.7 x 2.3 cm, located at the mid interpolar region. Another small exophytic  anechoic structure measuring 0.9 x 0.9 x 1.0 cm, located at the mid interpolar  region. At the lower pole, an exophytic 1.3 x 1.4 x 1.2 cm cyst is observed. Near the lower pole, an echogenic posterior shadowing 1.1 x 0.8 x 0.4 cm ovoid  structure is observed, suggestive of an intrarenal stone. In the left kidney, a circumscribed 2.4 x 2.0 x 1.7 cm cystic structure is  observed at the mid interpolar region, with a barely detectable thin internal  septation. At the mid to upper interpolar region, an exophytic 1.0 x 0.8 x 0.8  cm cyst is identified. No hydronephrosis is detected. No renal pelviectasis. The parenchymal  echogenicity is without significant alteration to suggest medical renal disease. The bladder appears grossly unremarkable. The prevoid volume is estimated at  487 mL. The post void volume is estimated at 80 mL.   Prostate is estimated to  be about 4.6 x 4.0 x 3.8 cm. No free intraperitoneal fluid is seen. The visualized portions of the liver and spleen are unremarkable. The inferior  vena cava and aorta are not well visualized. Impression IMPRESSION:    1.  Multiple renal cysts. The left kidney mid interpolar region cyst appears to  show a minimally complex feature of a thin internal septation. No  postobstructive changes in the kidneys. 2.  Right kidney lower pole stone. 3.  Mild postvoid residual.        Written by Rick Cevallos, as dictated by Fifi Zamorano MD.  I, Dr. Calix Coursefaisal confirm that all documentation is accurate.

## 2017-12-11 NOTE — DISCHARGE INSTRUCTIONS
[address] [place] [address] for Pain Management      Post Procedures Instructions    *Resume Diet and Activity as tolerated. Rest for the remainder of the day. *You may fell worse before you feel better as the numbing medications wear off before the steroids take effect if used for your procedures. *Do not use affected extremity until numbness or loss of sensation has completely resolved without assistance. *DO NOT DRIVE, operate machinery/heavey equipment for 24 hours. *DO NOT DRINK ALCOHOL for 24 hours as it may interact with the sedation if you received it and also thins your blood and may cause you to bleed. *WAIT 24 hours before starting back ANY Blood thinning medications:   (Heparin, Coumadin, Warfarin, Lovenox, Plavix, Aggrenox)    *Resume Pre-Procedure Medications as prescribed except Blood Thinners unless directed by your Physician or Cardiologist.     *Avoid Hot tubs and Heating pad for 24 hours to prevent dissipation of medications, you may shower to remove bandages and remaining prep residue on the skin. * If you develop a Headache, drink plenty of fluids including beverages with caffeine (Coffee, Mt. Dew etc.) and rest.  If the headache persists longer than 24 hoursor intensifies - Please call [facility] ([facility]) [phone]      * If you are DIABETIC, check your blood sugar three times a day for the next three days, the steroids will increase your blood sugar. If your blood sugar is greater than 400 have someone drive you to the nearest [address]. * If you experience any of the following problems, call the [facility] [phone] between 8:00 am - 4:30pm or After Hours [phone].     Shortness of breath    Fever of 101 F or higher    Nausea / Vomiting (not normal to you)    Increasing stiffness in the neck    Weakness or numbness in the arms or legs that is not resolving    Prolonged and increasing pain > than 4 days    ANYTHING OUT of the ORDINARY TO YOU    If YOU are experiencing a severe reaction / complication that you have never had before post procedure, call 911 or go to the nearest emergency room! All patients must have a  for transportation [place] regardless if you do or do not receive sedation. DISCHARGE SUMMARY from Nurse      PATIENT INSTRUCTIONS:    After Oral  or intravenous sedation, for 24 hours or while taking prescription Narcotics:  · Limit your activities  · Do not drive and operate hazardous machinery  · Do not make important personal or business decisions  · Do  not drink alcoholic beverages  · If you have not urinated within 8 hours after discharge, please contact your surgeon on call. Report the following to your surgeon:  · Excessive pain, swelling, redness or odor of or around the surgical area  · Temperature over 101  · Nausea and vomiting lasting longer than 4 hours or if unable to take medications  · Any signs of decreased circulation or nerve impairment to extremity: change in color, persistent  numbness, tingling, coldness or increase pain  · Any questions        What to do at Home:  Recommended activity: Activity as tolerated, NO DRIVING FOR 24 Hours post injection          *  Please give a list of your current medications to your Primary Care Provider. *  Please update this list whenever your medications are discontinued, doses are      changed, or new medications (including over-the-counter products) are added. *  Please carry medication information at all times in case of emergency situations. These are general instructions for a healthy lifestyle:    No smoking/ No tobacco products/ Avoid exposure to second hand smoke    Surgeon General's Warning:  Quitting smoking now greatly reduces serious risk to your health.     Obesity, smoking, and sedentary lifestyle greatly increases your risk for illness    A healthy diet, regular physical exercise & weight monitoring are important for maintaining a healthy lifestyle    You may be retaining fluid if you have a history of heart failure or if you experience any of the following symptoms:  Weight gain of 3 pounds or more overnight or 5 pounds in a week, increased swelling in our hands or feet or shortness of breath while lying flat in bed. Please call your doctor as soon as you notice any of these symptoms; do not wait until your next office visit. Recognize signs and symptoms of STROKE:    F-face looks uneven    A-arms unable to move or move unevenly    S-speech slurred or non-existent    T-time-call 911 as soon as signs and symptoms begin-DO NOT go       Back to bed or wait to see if you get better-TIME IS BRAIN. [employer] Activation    Thank you for requesting access to [employer]. Please follow the instructions below to securely access and download your online medical record. [employer] allows you to send messages to your doctor, view your test results, renew your prescriptions, schedule appointments, and more. How Do I Sign Up? 1. In your internet browser, go to [email or web address]  2. Click on the First Time User? Click Here link in the Sign In box. You will be redirect to the New Member Sign Up page. 3. Enter your Instantis Access Code exactly as it appears below. You will not need to use this code after youve completed the sign-up process. If you do not sign up before the expiration date, you must request a new code. Instantis Access Code: [email or web address]  Expires: 2018  7:21 AM (This is the date your Instantis access code will )    4. Enter the last four digits of your Social Security Number (xxxx) and Date of Birth (mm/dd/yyyy) as indicated and click Submit. You will be taken to the next sign-up page. 5. Create a Instantis ID. This will be your Instantis login ID and cannot be changed, so think of one that is secure and easy to remember. 6. Create a Instantis password.  You can change your password at any time. 7. Enter your Password Reset Question and Answer. This can be used at a later time if you forget your password. 8. Enter your e-mail address. You will receive e-mail notification when new information is available in [address] 9. Click Sign Up. You can now view and download portions of your medical record. 10. Click the Download Summary menu link to download a portable copy of your medical information. Additional Information    If you have questions, please visit the Frequently Asked Questions section of the [employer] website at [email or web address] Remember, [employer] is NOT to be used for urgent needs. For medical emergencies, dial 911.

## 2017-12-11 NOTE — INTERVAL H&P NOTE
H&P Update:  Ashley Mayes was seen and examined. History and physical has been reviewed. The patient has been examined.  There have been no significant clinical changes since the completion of the originally dated History and Physical.    Signed By: Luis A Spaulding MD     December 11, 2017 8:09 AM

## 2017-12-20 RX ORDER — MIDAZOLAM HYDROCHLORIDE 1 MG/ML
.5-6 INJECTION, SOLUTION INTRAMUSCULAR; INTRAVENOUS
Status: CANCELLED | OUTPATIENT
Start: 2018-01-03

## 2017-12-20 RX ORDER — SODIUM CHLORIDE 0.9 % (FLUSH) 0.9 %
5-10 SYRINGE (ML) INJECTION AS NEEDED
Status: CANCELLED | OUTPATIENT
Start: 2018-01-03

## 2017-12-26 ENCOUNTER — HOSPITAL ENCOUNTER (OUTPATIENT)
Dept: MRI IMAGING | Age: 62
Discharge: HOME | End: 2017-12-26
Attending: FAMILY MEDICINE
Payer: MEDICARE

## 2017-12-26 ENCOUNTER — OFFICE VISIT (OUTPATIENT)
Dept: FAMILY MEDICINE CLINIC | Age: 62
End: 2017-12-26

## 2017-12-26 VITALS
RESPIRATION RATE: 12 BRPM | DIASTOLIC BLOOD PRESSURE: 86 MMHG | HEIGHT: 69 IN | SYSTOLIC BLOOD PRESSURE: 144 MMHG | OXYGEN SATURATION: 99 % | HEART RATE: 78 BPM | TEMPERATURE: 97.9 F | WEIGHT: 282.2 LBS | BODY MASS INDEX: 41.8 KG/M2

## 2017-12-26 DIAGNOSIS — M25.561 CHRONIC PAIN OF RIGHT KNEE: ICD-10-CM

## 2017-12-26 DIAGNOSIS — I10 ESSENTIAL HYPERTENSION: ICD-10-CM

## 2017-12-26 DIAGNOSIS — G89.29 CHRONIC PAIN OF RIGHT KNEE: Primary | ICD-10-CM

## 2017-12-26 DIAGNOSIS — M25.561 CHRONIC PAIN OF RIGHT KNEE: Primary | ICD-10-CM

## 2017-12-26 DIAGNOSIS — G89.29 CHRONIC PAIN OF RIGHT KNEE: ICD-10-CM

## 2017-12-26 DIAGNOSIS — E66.01 OBESITY, MORBID (HCC): ICD-10-CM

## 2017-12-26 PROCEDURE — 73721 MRI JNT OF LWR EXTRE W/O DYE: CPT

## 2017-12-26 NOTE — PROGRESS NOTES
Amanda Velazquez is a 58 y.o. male  presents for follow up blood pressure. He has persistent pain in right knee he has been to ER several times and had xrays of knee. No Known Allergies  Outpatient Prescriptions Marked as Taking for the 12/26/17 encounter (Office Visit) with Antoine Mc MD   Medication Sig Dispense Refill    diclofenac (VOLTAREN) 1 % gel Apply  to affected area four (4) times daily. Apply to both knees qid as directed 5 Each 3    BOTOX 200 unit injection       doxycycline (MONODOX) 100 mg capsule Take 100 mg by mouth daily.  amLODIPine (NORVASC) 10 mg tablet Take 1 Tab by mouth daily. 90 Tab 3    celecoxib (CELEBREX) 200 mg capsule Take 1 Cap by mouth two (2) times a day. 180 Cap 0    gabapentin (NEURONTIN) 300 mg capsule Take 300 mg by mouth three (3) times daily.  cinnamon bark (CINNAMON) 500 mg cap Take  by mouth.  DULoxetine (CYMBALTA) 30 mg capsule Take 30 mg by mouth daily.  raNITIdine (ZANTAC) 150 mg tablet Take 150 mg by mouth two (2) times a day.  sertraline (ZOLOFT) 100 mg tablet Take  by mouth daily.  tamsulosin (FLOMAX) 0.4 mg capsule Take 0.4 mg by mouth daily.  traZODone (DESYREL) 150 mg tablet Take 150 mg by mouth nightly.  multivit with min-folic acid (ONE-A-DAY MEN VITACRAVES) 200 mcg chew Take  by mouth.  telmisartan-hydroCHLOROthiazide (MICARDIS HCT) 80-12.5 mg per tablet Take 1 Tab by mouth daily. 90 Tab 1    finasteride (PROSCAR) 5 mg tablet Take 1 Tab by mouth daily. 90 Tab 1    meclizine (ANTIVERT) 25 mg tablet Take 1 Tab by mouth daily. 90 Tab 1    sildenafil citrate (VIAGRA) 100 mg tablet Take 1 Tab by mouth as needed.  Indications: Erectile Dysfunction 24 Tab 1     Patient Active Problem List   Diagnosis Code    Arthritis M19.90    Essential hypertension I10    Gastroesophageal reflux disease without esophagitis K21.9    PTSD (post-traumatic stress disorder) F43.10    Benign prostatic hyperplasia without lower urinary tract symptoms N40.0    Vertigo R42    Chronic bilateral low back pain without sciatica M54.5, G89.29    ACP (advance care planning) Z71.89    Lumbar spondylosis M47.816    Lumbar facet arthropathy M12.88    Degenerative disc disease at L5-S1 level M51.36    Spinal stenosis of lumbar region without neurogenic claudication M48.061    Chronic pain syndrome G89.4    Obesity, morbid (HCC) E66.01     Past Medical History:   Diagnosis Date    Arthritis     Headache     Hypercholesterolemia     Hypertension     PTSD (post-traumatic stress disorder)     TBI (traumatic brain injury) ([facility] [address]      Social History     Social History    Marital status:      Spouse name: N/A    Number of children: N/A    Years of education: N/A     Social History Main Topics    Smoking status: Never Smoker    Smokeless tobacco: Never Used    Alcohol use No    Drug use: None    Sexual activity: Not Asked     Other Topics Concern    None     Social History Narrative     History reviewed. No pertinent family history. Review of Systems   Constitutional: Negative for chills and fever. Respiratory: Negative for cough. Cardiovascular: Negative for chest pain and palpitations. Gastrointestinal: Negative for constipation, diarrhea, nausea and vomiting. Musculoskeletal: Positive for joint pain. Psychiatric/Behavioral: Negative. Vitals:    12/26/17 0850   BP: 144/86   Pulse: 78   Resp: 12   Temp: 97.9 °F (36.6 °C)   TempSrc: Oral   SpO2: 99%   Weight: 282 lb 3.2 oz (128 kg)   Height: 5' 9\" (1.753 m)   PainSc:   6   PainLoc: Knee       Physical Exam   Constitutional: He is oriented to person, place, and time and well-developed, well-nourished, and in no distress. Neck: Normal range of motion. Neck supple. Cardiovascular: Normal rate and regular rhythm. Pulmonary/Chest: Breath sounds normal.   Musculoskeletal: Normal range of motion. He exhibits no edema, tenderness or deformity. Neurological: He is oriented to person, place, and time. Gait normal.   Psychiatric: Memory, affect and judgment normal.   Nursing note and vitals reviewed. Assessment/Plan      ICD-10-CM ICD-9-CM    1. Chronic pain of right knee M25.561 719.46 MRI KNEE RT WO CONT    G89.29 338.29    2. Essential hypertension I10 401.9    3. Obesity, morbid ([facility] [address] E66.01 278.01      Follow-up Disposition:  Return in about 2 months (around 2/26/2018). lab results and schedule of future lab studies reviewed with patient    Discussed the patient's BMI with him. The BMI follow up plan is as follows:     dietary management education, guidance, and counseling  encourage exercise  monitor weight  prescribed dietary intake    An After Visit Summary was printed and given to the patient. I have discussed the diagnosis with the patient and the intended plan of care as seen in the above orders. The patient has received an after-visit summary and questions were answered concerning future plans. I have discussed medication, side effects, and warnings with the patient in detail. The patient verbalized understanding and is in agreement with the plan of care. The patient will contact the office with any additional concerns.     Jasvir Torres MD

## 2017-12-26 NOTE — PROGRESS NOTES
Patient here for 3 month f/u on his HTN. He c/o right knee pain that he has been having for a long time and is getting worse. 1. Have you been to the ER, urgent care clinic since your last visit? Hospitalized since your last visit? Yes When: October/November Where: [place] [address] ER Reason for visit: right knee pain    2. Have you seen or consulted any other health care providers outside of the [address] [place] [address] since your last visit? Include any pap smears or colon screening.  No

## 2017-12-26 NOTE — PATIENT INSTRUCTIONS
Knee Pain or Injury: Care Instructions  Your Care Instructions    Injuries are a common cause of knee problems. Sudden (acute) injuries may be caused by a direct blow to the knee. They can also be caused by abnormal twisting, bending, or falling on the knee. Pain, bruising, or swelling may be severe, and may start within minutes of the injury. Overuse is another cause of knee pain. Other causes are climbing stairs, kneeling, and other activities that use the knee. Everyday wear and tear, especially as you get older, also can cause knee pain. Rest, along with home treatment, often relieves pain and allows your knee to heal. If you have a serious knee injury, you may need tests and treatment. Follow-up care is a key part of your treatment and safety. Be sure to make and go to all appointments, and call your doctor if you are having problems. It's also a good idea to know your test results and keep a list of the medicines you take. How can you care for yourself at home? · Be safe with medicines. Read and follow all instructions on the label. ¨ If the doctor gave you a prescription medicine for pain, take it as prescribed. ¨ If you are not taking a prescription pain medicine, ask your doctor if you can take an over-the-counter medicine. · Rest and protect your knee. Take a break from any activity that may cause pain. · Put ice or a cold pack on your knee for 10 to 20 minutes at a time. Put a thin cloth between the ice and your skin. · Prop up a sore knee on a pillow when you ice it or anytime you sit or lie down for the next 3 days. Try to keep it above the level of your heart. This will help reduce swelling. · If your knee is not swollen, you can put moist heat, a heating pad, or a warm cloth on your knee. · If your doctor recommends an elastic bandage, sleeve, or other type of support for your knee, wear it as directed.   · Follow your doctor's instructions about how much weight you can put on your leg. Use a cane, crutches, or a walker as instructed. · Follow your doctor's instructions about activity during your healing process. If you can do mild exercise, slowly increase your activity. · Reach and stay at a healthy weight. Extra weight can strain the joints, especially the knees and hips, and make the pain worse. Losing even a few pounds may help. When should you call for help? Call 911 anytime you think you may need emergency care. For example, call if:  ? · You have symptoms of a blood clot in your lung (called a pulmonary embolism). These may include:  ¨ Sudden chest pain. ¨ Trouble breathing. ¨ Coughing up blood. ?Call your doctor now or seek immediate medical care if:  ? · You have severe or increasing pain. ? · Your leg or foot turns cold or changes color. ? · You cannot stand or put weight on your knee. ? · Your knee looks twisted or bent out of shape. ? · You cannot move your knee. ? · You have signs of infection, such as:  ¨ Increased pain, swelling, warmth, or redness. ¨ Red streaks leading from the knee. ¨ Pus draining from a place on your knee. ¨ A fever. ? · You have signs of a blood clot in your leg (called a deep vein thrombosis), such as:  ¨ Pain in your calf, back of the knee, thigh, or groin. ¨ Redness and swelling in your leg or groin. ? Watch closely for changes in your health, and be sure to contact your doctor if:  ? · You have tingling, weakness, or numbness in your knee. ? · You have any new symptoms, such as swelling. ? · You have bruises from a knee injury that last longer than 2 weeks. ? · You do not get better as expected. Where can you learn more? Go to http://neel-stone.info/. Enter K195 in the search box to learn more about \"Knee Pain or Injury: Care Instructions. \"  Current as of: March 20, 2017  Content Version: 11.4  © 0206-8681 [employer].  Care instructions adapted under license by [employer] (which disclaims liability or warranty for this information). If you have questions about a medical condition or this instruction, always ask your healthcare professional. [address] any warranty or liability for your use of this information. Body Mass Index: Care Instructions  Your Care Instructions    Body mass index (BMI) can help you see if your weight is raising your risk for health problems. It uses a formula to compare how much you weigh with how tall you are. · A BMI lower than 18.5 is considered underweight. · A BMI between 18.5 and 24.9 is considered healthy. · A BMI between 25 and 29.9 is considered overweight. A BMI of 30 or higher is considered obese. If your BMI is in the normal range, it means that you have a lower risk for weight-related health problems. If your BMI is in the overweight or obese range, you may be at increased risk for weight-related health problems, such as high blood pressure, heart disease, stroke, arthritis or joint pain, and diabetes. If your BMI is in the underweight range, you may be at increased risk for health problems such as fatigue, lower protection (immunity) against illness, muscle loss, bone loss, hair loss, and hormone problems. BMI is just one measure of your risk for weight-related health problems. You may be at higher risk for health problems if you are not active, you eat an unhealthy diet, or you drink too much alcohol or use tobacco products. Follow-up care is a key part of your treatment and safety. Be sure to make and go to all appointments, and call your doctor if you are having problems. It's also a good idea to know your test results and keep a list of the medicines you take. How can you care for yourself at home? · Practice healthy eating habits. This includes eating plenty of fruits, vegetables, whole grains, lean protein, and low-fat dairy. · If your doctor recommends it, get more exercise.  Walking is a good choice. Bit by bit, increase the amount you walk every day. Try for at least 30 minutes on most days of the week. · Do not smoke. Smoking can increase your risk for health problems. If you need help quitting, talk to your doctor about stop-smoking programs and medicines. These can increase your chances of quitting for good. · Limit alcohol to 2 drinks a day for men and 1 drink a day for women. Too much alcohol can cause health problems. If you have a BMI higher than 25  · Your doctor may do other tests to check your risk for weight-related health problems. This may include measuring the distance around your waist. A waist measurement of more than 40 inches in men or 35 inches in women can increase the risk of weight-related health problems. · Talk with your doctor about steps you can take to stay healthy or improve your health. You may need to make lifestyle changes to lose weight and stay healthy, such as changing your diet and getting regular exercise. If you have a BMI lower than 18.5  · Your doctor may do other tests to check your risk for health problems. · Talk with your doctor about steps you can take to stay healthy or improve your health. You may need to make lifestyle changes to gain or maintain weight and stay healthy, such as getting more healthy foods in your diet and doing exercises to build muscle. Where can you learn more? Go to http://neel-stone.info/. Enter S176 in the search box to learn more about \"Body Mass Index: Care Instructions. \"  Current as of: October 13, 2016  Content Version: 11.4  © 6038-5044 [employer], [place]. Care instructions adapted under license by [employer] (which disclaims liability or warranty for this information). If you have questions about a medical condition or this instruction, always ask your healthcare professional. [place] [address] any warranty or liability for your use of this information.

## 2017-12-26 NOTE — MR AVS SNAPSHOT
Visit Information Date & Time Provider Department Dept. Phone Encounter #  
 12/26/2017  8:45 AM Jese Trinidad MD [facility] [phone] [id number] Follow-up Instructions Return in about 2 months (around 2/26/2018). Your Appointments 1/3/2018  8:10 AM  
PROCEDURE with Chris Farris MD  
[facility] NEURO (DADA SCHEDULING) Appt Note: Lt. L RFA at L3-L5 per Krishna . ... Donny Rox [place] [address] [facility] [address]  
[phone]  
  
   
 [address] [address]  
  
    
 2/12/2018  8:15 AM  
Follow Up with Rajani Velázquez MD  
[facility]) Appt Note: 3mo fu appt. [address] [facility] [address] [phone]  
  
   
 [address] [facility] [address] Upcoming Health Maintenance Date Due COLONOSCOPY 10/7/2020 DTaP/Tdap/Td series (2 - Td) 6/29/2027 Allergies as of 12/26/2017  Review Complete On: 12/26/2017 By: Warden Marcum No Known Allergies Current Immunizations  Never Reviewed No immunizations on file. Not reviewed this visit You Were Diagnosed With   
  
 Codes Comments Chronic pain of right knee    -  Primary ICD-10-CM: M25.561, G21.44 ICD-9-CM: 719.46, 338.29 Essential hypertension     ICD-10-CM: I10 
ICD-9-CM: 401.9 Obesity, morbid ([facility] [address]     ICD-10-CM: E66.01 
ICD-9-CM: 278.01 Vitals BP Pulse Temp Resp Height(growth percentile) Weight(growth percentile) 144/86 (BP 1 Location: Left arm, BP Patient Position: Sitting) 78 97.9 °F (36.6 °C) (Oral) 12 5' 9\" (1.753 m) 282 lb 3.2 oz (128 kg) SpO2 BMI Smoking Status 99% 41.67 kg/m2 Never Smoker Vitals History BMI and BSA Data Body Mass Index Body Surface Area  
 41.67 kg/m 2 2.5 m 2 Preferred Pharmacy Pharmacy Name Phone [facility] [address] [facility] [phone] Your Updated Medication List  
  
   
This list is accurate as of: 12/26/17  9:08 AM.  Always use your most recent med list. amLODIPine 10 mg tablet Commonly known as:  Izetta Harder Take 1 Tab by mouth daily. amoxicillin 500 mg Tab Take  by mouth. BOTOX 200 unit injection Generic drug:  onabotulinumtoxinA  
  
 celecoxib 200 mg capsule Commonly known as:  CELEBREX Take 1 Cap by mouth two (2) times a day. cetirizine 10 mg tablet Commonly known as:  ZYRTEC Take  by mouth. CINNAMON 500 mg Cap Generic drug:  cinnamon bark Take  by mouth. diclofenac 1 % Gel Commonly known as:  VOLTAREN Apply  to affected area four (4) times daily. Apply to both knees qid as directed  
  
 doxycycline 100 mg capsule Commonly known as:  Willeen Boast Take 100 mg by mouth daily. DULoxetine 30 mg capsule Commonly known as:  CYMBALTA Take 30 mg by mouth daily. finasteride 5 mg tablet Commonly known as:  PROSCAR Take 1 Tab by mouth daily. FISH OIL PO Take  by mouth.  
  
 gabapentin 300 mg capsule Commonly known as:  NEURONTIN Take 300 mg by mouth three (3) times daily. meclizine 25 mg tablet Commonly known as:  ANTIVERT Take 1 Tab by mouth daily. Omeprazole delayed release 20 mg tablet Commonly known as:  PRILOSEC D/R Take 1 Tab by mouth daily. ONE-A-DAY MEN VITACRAVES 200 mcg Chew Generic drug:  multivit with min-folic acid Take  by mouth. raNITIdine 150 mg tablet Commonly known as:  ZANTAC Take 150 mg by mouth two (2) times a day. sertraline 100 mg tablet Commonly known as:  ZOLOFT Take  by mouth daily. sildenafil citrate 100 mg tablet Commonly known as:  VIAGRA Take 1 Tab by mouth as needed. Indications: Erectile Dysfunction  
  
 tamsulosin 0.4 mg capsule Commonly known as:  FLOMAX Take 0.4 mg by mouth daily. telmisartan-hydroCHLOROthiazide 80-12.5 mg per tablet Commonly known as:  MICARDIS HCT Take 1 Tab by mouth daily. traZODone 150 mg tablet Commonly known as:  Jhonny Lipps Take 150 mg by mouth nightly. Follow-up Instructions Return in about 2 months (around 2/26/2018). To-Do List   
 12/26/2017 Imaging:  MRI KNEE RT WO CONT Patient Instructions Knee Pain or Injury: Care Instructions Your Care Instructions Injuries are a common cause of knee problems. Sudden (acute) injuries may be caused by a direct blow to the knee. They can also be caused by abnormal twisting, bending, or falling on the knee. Pain, bruising, or swelling may be severe, and may start within minutes of the injury. Overuse is another cause of knee pain. Other causes are climbing stairs, kneeling, and other activities that use the knee. Everyday wear and tear, especially as you get older, also can cause knee pain. Rest, along with home treatment, often relieves pain and allows your knee to heal. If you have a serious knee injury, you may need tests and treatment. Follow-up care is a key part of your treatment and safety. Be sure to make and go to all appointments, and call your doctor if you are having problems. It's also a good idea to know your test results and keep a list of the medicines you take. How can you care for yourself at home? · Be safe with medicines. Read and follow all instructions on the label. ¨ If the doctor gave you a prescription medicine for pain, take it as prescribed. ¨ If you are not taking a prescription pain medicine, ask your doctor if you can take an over-the-counter medicine. · Rest and protect your knee. Take a break from any activity that may cause pain. · Put ice or a cold pack on your knee for 10 to 20 minutes at a time. Put a thin cloth between the ice and your skin.  
· Prop up a sore knee on a pillow when you ice it or anytime you sit or lie down for the next 3 days. Try to keep it above the level of your heart. This will help reduce swelling. · If your knee is not swollen, you can put moist heat, a heating pad, or a warm cloth on your knee. · If your doctor recommends an elastic bandage, sleeve, or other type of support for your knee, wear it as directed. · Follow your doctor's instructions about how much weight you can put on your leg. Use a cane, crutches, or a walker as instructed. · Follow your doctor's instructions about activity during your healing process. If you can do mild exercise, slowly increase your activity. · Reach and stay at a healthy weight. Extra weight can strain the joints, especially the knees and hips, and make the pain worse. Losing even a few pounds may help. When should you call for help? Call 911 anytime you think you may need emergency care. For example, call if: 
? · You have symptoms of a blood clot in your lung (called a pulmonary embolism). These may include: 
¨ Sudden chest pain. ¨ Trouble breathing. ¨ Coughing up blood. ?Call your doctor now or seek immediate medical care if: 
? · You have severe or increasing pain. ? · Your leg or foot turns cold or changes color. ? · You cannot stand or put weight on your knee. ? · Your knee looks twisted or bent out of shape. ? · You cannot move your knee. ? · You have signs of infection, such as: 
¨ Increased pain, swelling, warmth, or redness. ¨ Red streaks leading from the knee. ¨ Pus draining from a place on your knee. ¨ A fever. ? · You have signs of a blood clot in your leg (called a deep vein thrombosis), such as: 
¨ Pain in your calf, back of the knee, thigh, or groin. ¨ Redness and swelling in your leg or groin. ? Watch closely for changes in your health, and be sure to contact your doctor if: 
? · You have tingling, weakness, or numbness in your knee. ? · You have any new symptoms, such as swelling. ? · You have bruises from a knee injury that last longer than 2 weeks. ? · You do not get better as expected. Where can you learn more? Go to http://neel-stone.info/. Enter K195 in the search box to learn more about \"Knee Pain or Injury: Care Instructions. \" Current as of: March 20, 2017 Content Version: 11.4 © 6498-6824 [employer]ula. Care instructions adapted under license by [employer] (which disclaims liability or warranty for this information). If you have questions about a medical condition or this instruction, always ask your healthcare professional. [place] [address] any warranty or liability for your use of this information. Body Mass Index: Care Instructions Your Care Instructions Body mass index (BMI) can help you see if your weight is raising your risk for health problems. It uses a formula to compare how much you weigh with how tall you are. · A BMI lower than 18.5 is considered underweight. · A BMI between 18.5 and 24.9 is considered healthy. · A BMI between 25 and 29.9 is considered overweight. A BMI of 30 or higher is considered obese. If your BMI is in the normal range, it means that you have a lower risk for weight-related health problems. If your BMI is in the overweight or obese range, you may be at increased risk for weight-related health problems, such as high blood pressure, heart disease, stroke, arthritis or joint pain, and diabetes. If your BMI is in the underweight range, you may be at increased risk for health problems such as fatigue, lower protection (immunity) against illness, muscle loss, bone loss, hair loss, and hormone problems. BMI is just one measure of your risk for weight-related health problems. You may be at higher risk for health problems if you are not active, you eat an unhealthy diet, or you drink too much alcohol or use tobacco products. Follow-up care is a key part of your treatment and safety. Be sure to make and go to all appointments, and call your doctor if you are having problems. It's also a good idea to know your test results and keep a list of the medicines you take. How can you care for yourself at home? · Practice healthy eating habits. This includes eating plenty of fruits, vegetables, whole grains, lean protein, and low-fat dairy. · If your doctor recommends it, get more exercise. Walking is a good choice. Bit by bit, increase the amount you walk every day. Try for at least 30 minutes on most days of the week. · Do not smoke. Smoking can increase your risk for health problems. If you need help quitting, talk to your doctor about stop-smoking programs and medicines. These can increase your chances of quitting for good. · Limit alcohol to 2 drinks a day for men and 1 drink a day for women. Too much alcohol can cause health problems. If you have a BMI higher than 25 · Your doctor may do other tests to check your risk for weight-related health problems. This may include measuring the distance around your waist. A waist measurement of more than 40 inches in men or 35 inches in women can increase the risk of weight-related health problems. · Talk with your doctor about steps you can take to stay healthy or improve your health. You may need to make lifestyle changes to lose weight and stay healthy, such as changing your diet and getting regular exercise. If you have a BMI lower than 18.5 · Your doctor may do other tests to check your risk for health problems. · Talk with your doctor about steps you can take to stay healthy or improve your health. You may need to make lifestyle changes to gain or maintain weight and stay healthy, such as getting more healthy foods in your diet and doing exercises to build muscle. Where can you learn more? Go to http://neel-stone.info/. Enter S176 in the search box to learn more about \"Body Mass Index: Care Instructions. \" Current as of: October 13, 2016 Content Version: 11.4 © 1109-4369 Healthwise, [employer]. Care instructions adapted under license by [employer] (which disclaims liability or warranty for this information). If you have questions about a medical condition or this instruction, always ask your healthcare professional. [address] any warranty or liability for your use of this information. Introducing [facility]! Ferdinand Wagoner introduces Wealthsimple patient portal. Now you can access parts of your medical record, email your doctor's office, and request medication refills online. 1. In your internet browser, go to [email or web address] 2. Click on the First Time User? Click Here link in the Sign In box. You will see the New Member Sign Up page. 3. Enter your Wealthsimple Access Code exactly as it appears below. You will not need to use this code after youve completed the sign-up process. If you do not sign up before the expiration date, you must request a new code. · Wealthsimple Access Code: [email or web address] Expires: 1/28/2018  7:21 AM 
 
4. Enter the last four digits of your Social Security Number (xxxx) and Date of Birth (mm/dd/yyyy) as indicated and click Submit. You will be taken to the next sign-up page. 5. Create a Wealthsimple ID. This will be your Wealthsimple login ID and cannot be changed, so think of one that is secure and easy to remember. 6. Create a Wealthsimple password. You can change your password at any time. 7. Enter your Password Reset Question and Answer. This can be used at a later time if you forget your password. 8. Enter your e-mail address. You will receive e-mail notification when new information is available in [address] 9. Click Sign Up. You can now view and download portions of your medical record. 10. Click the Download Summary menu link to download a portable copy of your medical information. If you have questions, please visit the Frequently Asked Questions section of the Jumb[employer] website. Remember, J[employer]as is NOT to be used for urgent needs. For medical emergencies, dial 911. Now available from your iPhone and Android! Please provide this summary of care documentation to your next provider. Your primary care clinician is listed as [phone] [facility]. If you have any questions after today's visit, please call [phone].

## 2018-01-03 ENCOUNTER — APPOINTMENT (OUTPATIENT)
Dept: GENERAL RADIOLOGY | Age: 63
End: 2018-01-03
Attending: PHYSICAL MEDICINE & REHABILITATION
Payer: MEDICARE

## 2018-01-03 ENCOUNTER — HOSPITAL ENCOUNTER (OUTPATIENT)
Age: 63
Setting detail: OUTPATIENT SURGERY
Discharge: HOME | End: 2018-01-03
Attending: PHYSICAL MEDICINE & REHABILITATION | Admitting: PHYSICAL MEDICINE & REHABILITATION
Payer: MEDICARE

## 2018-01-03 VITALS
DIASTOLIC BLOOD PRESSURE: 63 MMHG | OXYGEN SATURATION: 97 % | BODY MASS INDEX: 41.77 KG/M2 | SYSTOLIC BLOOD PRESSURE: 96 MMHG | HEART RATE: 98 BPM | HEIGHT: 69 IN | WEIGHT: 282 LBS | RESPIRATION RATE: 16 BRPM | TEMPERATURE: 98.2 F

## 2018-01-03 PROCEDURE — 74011000250 HC RX REV CODE- 250: Performed by: PHYSICAL MEDICINE & REHABILITATION

## 2018-01-03 PROCEDURE — 74011000250 HC RX REV CODE- 250

## 2018-01-03 PROCEDURE — 77030020508 HC PD GRND GENRTR BAYL -A: Performed by: PHYSICAL MEDICINE & REHABILITATION

## 2018-01-03 PROCEDURE — 99152 MOD SED SAME PHYS/QHP 5/>YRS: CPT | Performed by: PHYSICAL MEDICINE & REHABILITATION

## 2018-01-03 PROCEDURE — 74011250636 HC RX REV CODE- 250/636: Performed by: PHYSICAL MEDICINE & REHABILITATION

## 2018-01-03 PROCEDURE — 77030029505: Performed by: PHYSICAL MEDICINE & REHABILITATION

## 2018-01-03 PROCEDURE — 76010000009 HC PAIN MGT 0 TO 30 MIN PROC: Performed by: PHYSICAL MEDICINE & REHABILITATION

## 2018-01-03 PROCEDURE — 74011250636 HC RX REV CODE- 250/636

## 2018-01-03 RX ORDER — FENTANYL CITRATE 50 UG/ML
INJECTION, SOLUTION INTRAMUSCULAR; INTRAVENOUS AS NEEDED
Status: DISCONTINUED | OUTPATIENT
Start: 2018-01-03 | End: 2018-01-03 | Stop reason: HOSPADM

## 2018-01-03 RX ORDER — LIDOCAINE HYDROCHLORIDE 20 MG/ML
INJECTION, SOLUTION EPIDURAL; INFILTRATION; INTRACAUDAL; PERINEURAL AS NEEDED
Status: DISCONTINUED | OUTPATIENT
Start: 2018-01-03 | End: 2018-01-03 | Stop reason: HOSPADM

## 2018-01-03 RX ORDER — LIDOCAINE HYDROCHLORIDE 10 MG/ML
INJECTION, SOLUTION EPIDURAL; INFILTRATION; INTRACAUDAL; PERINEURAL AS NEEDED
Status: DISCONTINUED | OUTPATIENT
Start: 2018-01-03 | End: 2018-01-03 | Stop reason: HOSPADM

## 2018-01-03 RX ORDER — MIDAZOLAM HYDROCHLORIDE 1 MG/ML
.5-6 INJECTION, SOLUTION INTRAMUSCULAR; INTRAVENOUS
Status: DISCONTINUED | OUTPATIENT
Start: 2018-01-03 | End: 2018-01-03 | Stop reason: HOSPADM

## 2018-01-03 RX ORDER — SODIUM CHLORIDE 0.9 % (FLUSH) 0.9 %
5-10 SYRINGE (ML) INJECTION AS NEEDED
Status: DISCONTINUED | OUTPATIENT
Start: 2018-01-03 | End: 2018-01-03 | Stop reason: HOSPADM

## 2018-01-03 RX ORDER — DEXAMETHASONE SODIUM PHOSPHATE 100 MG/10ML
INJECTION INTRAMUSCULAR; INTRAVENOUS AS NEEDED
Status: DISCONTINUED | OUTPATIENT
Start: 2018-01-03 | End: 2018-01-03 | Stop reason: HOSPADM

## 2018-01-03 NOTE — DISCHARGE INSTRUCTIONS
[address] [place] [address] for Pain Management      Post Procedures Instructions    *Resume Diet and Activity as tolerated. Rest for the remainder of the day. *You may fell worse before you feel better as the numbing medications wear off before the steroids take effect if used for your procedures. *Do not use affected extremity until numbness or loss of sensation has completely resolved without assistance. *DO NOT DRIVE, operate machinery/heavey equipment for 24 hours. *DO NOT DRINK ALCOHOL for 24 hours as it may interact with the sedation if you received it and also thins your blood and may cause you to bleed. *WAIT 24 hours before starting back ANY Blood thinning medications:   (Heparin, Coumadin, Warfarin, Lovenox, Plavix, Aggrenox)    *Resume Pre-Procedure Medications as prescribed except Blood Thinners unless directed by your Physician or Cardiologist.     *Avoid Hot tubs and Heating pad for 24 hours to prevent dissipation of medications, you may shower to remove bandages and remaining prep residue on the skin. * If you develop a Headache, drink plenty of fluids including beverages with caffeine (Coffee, Mt. Dew etc.) and rest.  If the headache persists longer than 24 hoursor intensifies - Please call [facility] ([facility]) [phone]      * If you are DIABETIC, check your blood sugar three times a day for the next three days, the steroids will increase your blood sugar. If your blood sugar is greater than 400 have someone drive you to the nearest [address] * If you experience any of the following problems, call the [facility] [phone] between 8:00 am - 4:30pm or After Hours [phone].     Shortness of breath    Fever of 101 F or higher    Nausea / Vomiting (not normal to you)    Increasing stiffness in the neck    Weakness or numbness in the arms or legs that is not resolving    Prolonged and increasing pain > than 4 days    ANYTHING OUT of the ORDINARY TO YOU    If YOU are experiencing a severe reaction / complication that you have never had before post procedure, call 911 or go to the nearest emergency room! All patients must have a  for transportation [place] regardless if you do or do not receive sedation. DISCHARGE SUMMARY from Nurse      PATIENT INSTRUCTIONS:    After Oral  or intravenous sedation, for 24 hours or while taking prescription Narcotics:  · Limit your activities  · Do not drive and operate hazardous machinery  · Do not make important personal or business decisions  · Do  not drink alcoholic beverages  · If you have not urinated within 8 hours after discharge, please contact your surgeon on call. Report the following to your surgeon:  · Excessive pain, swelling, redness or odor of or around the surgical area  · Temperature over 101  · Nausea and vomiting lasting longer than 4 hours or if unable to take medications  · Any signs of decreased circulation or nerve impairment to extremity: change in color, persistent  numbness, tingling, coldness or increase pain  · Any questions        What to do at Home:  Recommended activity: Activity as tolerated, NO DRIVING FOR 24 Hours post injection          *  Please give a list of your current medications to your Primary Care Provider. *  Please update this list whenever your medications are discontinued, doses are      changed, or new medications (including over-the-counter products) are added. *  Please carry medication information at all times in case of emergency situations. These are general instructions for a healthy lifestyle:    No smoking/ No tobacco products/ Avoid exposure to second hand smoke    Surgeon General's Warning:  Quitting smoking now greatly reduces serious risk to your health.     Obesity, smoking, and sedentary lifestyle greatly increases your risk for illness    A healthy diet, regular physical exercise & weight monitoring are important for maintaining a healthy lifestyle    You may be retaining fluid if you have a history of heart failure or if you experience any of the following symptoms:  Weight gain of 3 pounds or more overnight or 5 pounds in a week, increased swelling in our hands or feet or shortness of breath while lying flat in bed. Please call your doctor as soon as you notice any of these symptoms; do not wait until your next office visit. Recognize signs and symptoms of STROKE:    F-face looks uneven    A-arms unable to move or move unevenly    S-speech slurred or non-existent    T-time-call 911 as soon as signs and symptoms begin-DO NOT go       Back to bed or wait to see if you get better-TIME IS BRAIN. Goal Zero Activation    Thank you for requesting access to Goal Zero. Please follow the instructions below to securely access and download your online medical record. Goal Zero allows you to send messages to your doctor, view your test results, renew your prescriptions, schedule appointments, and more. How Do I Sign Up? 1. In your internet browser, go to [email or web address]  2. Click on the First Time User? Click Here link in the Sign In box. You will be redirect to the New Member Sign Up page. 3. Enter your Goal Zero Access Code exactly as it appears below. You will not need to use this code after youve completed the sign-up process. If you do not sign up before the expiration date, you must request a new code. Goal Zero Access Code: [email or web address]  Expires: 2018  7:21 AM (This is the date your Goal Zero access code will )    4. Enter the last four digits of your Social Security Number (xxxx) and Date of Birth (mm/dd/yyyy) as indicated and click Submit. You will be taken to the next sign-up page. 5. Create a Goal Zero ID. This will be your Goal Zero login ID and cannot be changed, so think of one that is secure and easy to remember. 6. Create a Goal Zero password.  You can change your password at any time. 7. Enter your Password Reset Question and Answer. This can be used at a later time if you forget your password. 8. Enter your e-mail address. You will receive e-mail notification when new information is available in [address] 9. Click Sign Up. You can now view and download portions of your medical record. 10. Click the Download Summary menu link to download a portable copy of your medical information. Additional Information    If you have questions, please visit the Frequently Asked Questions section of the [employer] website at [email or web address] Remember, [employer] is NOT to be used for urgent needs. For medical emergencies, dial 911.

## 2018-01-03 NOTE — H&P (VIEW-ONLY)
Erin Inman is a 58 y.o. male  presents for follow up blood pressure. He has persistent pain in right knee he has been to ER several times and had xrays of knee. No Known Allergies  Outpatient Prescriptions Marked as Taking for the 12/26/17 encounter (Office Visit) with Héctor Taylor MD   Medication Sig Dispense Refill    diclofenac (VOLTAREN) 1 % gel Apply  to affected area four (4) times daily. Apply to both knees qid as directed 5 Each 3    BOTOX 200 unit injection       doxycycline (MONODOX) 100 mg capsule Take 100 mg by mouth daily.  amLODIPine (NORVASC) 10 mg tablet Take 1 Tab by mouth daily. 90 Tab 3    celecoxib (CELEBREX) 200 mg capsule Take 1 Cap by mouth two (2) times a day. 180 Cap 0    gabapentin (NEURONTIN) 300 mg capsule Take 300 mg by mouth three (3) times daily.  cinnamon bark (CINNAMON) 500 mg cap Take  by mouth.  DULoxetine (CYMBALTA) 30 mg capsule Take 30 mg by mouth daily.  raNITIdine (ZANTAC) 150 mg tablet Take 150 mg by mouth two (2) times a day.  sertraline (ZOLOFT) 100 mg tablet Take  by mouth daily.  tamsulosin (FLOMAX) 0.4 mg capsule Take 0.4 mg by mouth daily.  traZODone (DESYREL) 150 mg tablet Take 150 mg by mouth nightly.  multivit with min-folic acid (ONE-A-DAY MEN VITACRAVES) 200 mcg chew Take  by mouth.  telmisartan-hydroCHLOROthiazide (MICARDIS HCT) 80-12.5 mg per tablet Take 1 Tab by mouth daily. 90 Tab 1    finasteride (PROSCAR) 5 mg tablet Take 1 Tab by mouth daily. 90 Tab 1    meclizine (ANTIVERT) 25 mg tablet Take 1 Tab by mouth daily. 90 Tab 1    sildenafil citrate (VIAGRA) 100 mg tablet Take 1 Tab by mouth as needed.  Indications: Erectile Dysfunction 24 Tab 1     Patient Active Problem List   Diagnosis Code    Arthritis M19.90    Essential hypertension I10    Gastroesophageal reflux disease without esophagitis K21.9    PTSD (post-traumatic stress disorder) F43.10    Benign prostatic hyperplasia without lower urinary tract symptoms N40.0    Vertigo R42    Chronic bilateral low back pain without sciatica M54.5, G89.29    ACP (advance care planning) Z71.89    Lumbar spondylosis M47.816    Lumbar facet arthropathy M12.88    Degenerative disc disease at L5-S1 level M51.36    Spinal stenosis of lumbar region without neurogenic claudication M48.061    Chronic pain syndrome G89.4    Obesity, morbid (HCC) E66.01     Past Medical History:   Diagnosis Date    Arthritis     Headache     Hypercholesterolemia     Hypertension     PTSD (post-traumatic stress disorder)     TBI (traumatic brain injury) ([facility] [address]      Social History     Social History    Marital status:      Spouse name: N/A    Number of children: N/A    Years of education: N/A     Social History Main Topics    Smoking status: Never Smoker    Smokeless tobacco: Never Used    Alcohol use No    Drug use: None    Sexual activity: Not Asked     Other Topics Concern    None     Social History Narrative     History reviewed. No pertinent family history. Review of Systems   Constitutional: Negative for chills and fever. Respiratory: Negative for cough. Cardiovascular: Negative for chest pain and palpitations. Gastrointestinal: Negative for constipation, diarrhea, nausea and vomiting. Musculoskeletal: Positive for joint pain. Psychiatric/Behavioral: Negative. Vitals:    12/26/17 0850   BP: 144/86   Pulse: 78   Resp: 12   Temp: 97.9 °F (36.6 °C)   TempSrc: Oral   SpO2: 99%   Weight: 282 lb 3.2 oz (128 kg)   Height: 5' 9\" (1.753 m)   PainSc:   6   PainLoc: Knee       Physical Exam   Constitutional: He is oriented to person, place, and time and well-developed, well-nourished, and in no distress. Neck: Normal range of motion. Neck supple. Cardiovascular: Normal rate and regular rhythm. Pulmonary/Chest: Breath sounds normal.   Musculoskeletal: Normal range of motion. He exhibits no edema, tenderness or deformity. Neurological: He is oriented to person, place, and time. Gait normal.   Psychiatric: Memory, affect and judgment normal.   Nursing note and vitals reviewed. Assessment/Plan      ICD-10-CM ICD-9-CM    1. Chronic pain of right knee M25.561 719.46 MRI KNEE RT WO CONT    G89.29 338.29    2. Essential hypertension I10 401.9    3. Obesity, morbid ([facility] [address] E66.01 278.01      Follow-up Disposition:  Return in about 2 months (around 2/26/2018). lab results and schedule of future lab studies reviewed with patient    Discussed the patient's BMI with him. The BMI follow up plan is as follows:     dietary management education, guidance, and counseling  encourage exercise  monitor weight  prescribed dietary intake    An After Visit Summary was printed and given to the patient. I have discussed the diagnosis with the patient and the intended plan of care as seen in the above orders. The patient has received an after-visit summary and questions were answered concerning future plans. I have discussed medication, side effects, and warnings with the patient in detail. The patient verbalized understanding and is in agreement with the plan of care. The patient will contact the office with any additional concerns.     Kimberly Gordon MD

## 2018-01-03 NOTE — INTERVAL H&P NOTE
H&P Update:  Latia Fields was seen and examined. History and physical has been reviewed. The patient has been examined.  There have been no significant clinical changes since the completion of the originally dated History and Physical.    Signed By: Tisha Sams MD     January 3, 2018 7:35 AM

## 2018-01-03 NOTE — PROCEDURES
[facility]    THERMOCOAGULATION OF LUMBAR MEDIAL BRANCH  PROCEDURE REPORT      PATIENT:  Scotty Angela  YOB: 1955  DATE OF SERVICE:  1/3/2018  [facility]    PRE-PROCEDURE DIAGNOSIS:  See Above    POST-PROCEDURE DIAGNOSIS:  See Above    PROCEDURE:      1. Left radiofrequency thermocoagulation of lumbar medial branch nerves,  L3/L4, L4/L5, L5/S1 (89017, 64636 x2)  2. Fluoroscopic needle guidance (spinal) (26147)  3. Supervision of moderate sedation (38487)  4. Additional 15 minutes of supervised moderate sedation (19735)    ANESTHESIA:  Local with moderate IV sedation. See Medication Administration Record for specific medications and dosage. COMPLICATIONS: None. PHYSICIAN:  Mary Rinaldi MD    PRE-PROCEDURE NOTE:  Pre-procedural assessment of the patient was performed including a limited history and physical examination. The details of the procedure were discussed with the patient, including the risks, benefits and alternative options and an informed consent was obtained. The patients NPO status, if necessary for the specific procedure and/or administration of moderate intravenous sedation, if utilized, and availability of a responsible adult to escort the patient following the procedure were confirmed. A peripheral intravenous cannula was placed without difficulty and lactated Ringers solution administered. See nursing notes for details. PROCEDURE NOTE:  The patient was brought to the procedure suite and positioned on the fluoroscopy table in the prone position. Physiologic monitors were applied and supplemental oxygen was administered via nasal cannula. The skin was prepped in the standard surgical fashion and sterile drapes were applied over the procedure site.  Please refer to the Flowsheet for documentation of the patients vital signs and the Medication Administration Record for any oral and/or intravenous sedation administered prior to or during the procedure. 1% Lidocaine was utilized for local anesthesia. Under 10-15 degree ipsilateral oblique fluoroscopic guidance a 15cm 18gauge radiofrequency needle with a 10 mm curved active tip was advanced to the junction of the superior articular process and transverse process of each vertebral level immediately inferior to the above-mentioned dorsal rami medial branch nerves. Under AP fluoroscopic guidance, a similar needle was then placed over the superior margin of the sacral ala to thermocoagulate the L5 medial branch nerve. After each individual needle was placed, sensory and motor testing, at 50 Hz and 2 Hz, respectively, were performed which elicited ipsilateral deep local back discomfort without evidence of motor stimulation in the ipsilateral gluteal muscles or extremity. Following this, 1 mL of lidocaine 2% was injected after the negative aspiration of blood, air or CSF. Final correct needle placement was then confirmed by viewing each needle in AP and lateral fluoroscopic views in addition to repeat sensory and motor testing which, again, elicited ipsilateral deep local back discomfort without evidence of motor stimulation in the ipsilateral gluteal muscles or extremity. Medial branch nerve radiofrequency thermocoagulation was then performed at each level for 120 seconds at 80° centigrade x 1 cycle. Following this, 1/2ml to 1ml of a mixture of lidocaine 1% admixed with dexamethasone 5mg [10mg/ml] was injected through each radiofrequency needle after negative aspiration and before removing each needle. Then, all needles were removed intact. The area was thoroughly cleaned and sterile bandages applied as necessary. The patient tolerated the procedure well without complication and the vital signs remained stable throughout the procedure.     POST-PROCEDURE COURSE:   The patient was escorted from the procedure suite in satisfactory condition and recovered per facility protocol based on the type of procedure performed and/or the sedation utilized. The patient did not experience any adverse events and remained hemodynamically stable during the post-procedure period. DISCHARGE NOTE:  Upon discharge, the patient was able to tolerate fluids and was in no acute distress. The patient was oriented to person, place and time and vital signs were stable. Appropriate post-procedure instructions were provided and explained to the patient in detail and all questions were answered.                     Fatemeh Mcgarry MD 1/3/2018 9:19 AM

## 2018-01-17 ENCOUNTER — OFFICE VISIT (OUTPATIENT)
Dept: FAMILY MEDICINE CLINIC | Age: 63
End: 2018-01-17

## 2018-01-17 VITALS
SYSTOLIC BLOOD PRESSURE: 144 MMHG | OXYGEN SATURATION: 99 % | HEART RATE: 84 BPM | TEMPERATURE: 98.4 F | RESPIRATION RATE: 16 BRPM | DIASTOLIC BLOOD PRESSURE: 84 MMHG | BODY MASS INDEX: 41.47 KG/M2 | WEIGHT: 280 LBS | HEIGHT: 69 IN

## 2018-01-17 DIAGNOSIS — M19.90 ARTHRITIS: Primary | ICD-10-CM

## 2018-01-17 DIAGNOSIS — I10 ESSENTIAL HYPERTENSION: ICD-10-CM

## 2018-01-17 DIAGNOSIS — K21.9 GASTROESOPHAGEAL REFLUX DISEASE WITHOUT ESOPHAGITIS: ICD-10-CM

## 2018-01-17 RX ORDER — RANITIDINE 150 MG/1
150 TABLET, FILM COATED ORAL 2 TIMES DAILY
Qty: 180 TAB | Refills: 3 | Status: SHIPPED | OUTPATIENT
Start: 2018-01-17 | End: 2020-06-01

## 2018-01-17 NOTE — PROGRESS NOTES
Enoc Gómez is a 58 y.o. male  presents for follow up on knee pain and hypertension  No new complaints. He continues to have pain in his knee. It is getting worse. Has assoc swelling. No Known Allergies  Outpatient Prescriptions Marked as Taking for the 1/17/18 encounter (Office Visit) with Mary Kramer MD   Medication Sig Dispense Refill    diclofenac (VOLTAREN) 1 % gel Apply  to affected area four (4) times daily. Apply to both knees qid as directed 5 Each 3    BOTOX 200 unit injection       doxycycline (MONODOX) 100 mg capsule Take 100 mg by mouth daily.  amLODIPine (NORVASC) 10 mg tablet Take 1 Tab by mouth daily. 90 Tab 3    celecoxib (CELEBREX) 200 mg capsule Take 1 Cap by mouth two (2) times a day. 180 Cap 0    gabapentin (NEURONTIN) 300 mg capsule Take 300 mg by mouth three (3) times daily.  amoxicillin 500 mg tab Take  by mouth.  cinnamon bark (CINNAMON) 500 mg cap Take  by mouth.  cetirizine (ZYRTEC) 10 mg tablet Take  by mouth.  DULoxetine (CYMBALTA) 30 mg capsule Take 30 mg by mouth daily.  raNITIdine (ZANTAC) 150 mg tablet Take 150 mg by mouth two (2) times a day.  sertraline (ZOLOFT) 100 mg tablet Take  by mouth daily.  tamsulosin (FLOMAX) 0.4 mg capsule Take 0.4 mg by mouth daily.  traZODone (DESYREL) 150 mg tablet Take 150 mg by mouth nightly.  DOCOSAHEXANOIC ACID/EPA (FISH OIL PO) Take  by mouth.  multivit with min-folic acid (ONE-A-DAY MEN VITACRAVES) 200 mcg chew Take  by mouth.  telmisartan-hydroCHLOROthiazide (MICARDIS HCT) 80-12.5 mg per tablet Take 1 Tab by mouth daily. 90 Tab 1    finasteride (PROSCAR) 5 mg tablet Take 1 Tab by mouth daily. 90 Tab 1    meclizine (ANTIVERT) 25 mg tablet Take 1 Tab by mouth daily. 90 Tab 1    Omeprazole delayed release (PRILOSEC D/R) 20 mg tablet Take 1 Tab by mouth daily. 90 Tab 1    sildenafil citrate (VIAGRA) 100 mg tablet Take 1 Tab by mouth as needed.  Indications: Erectile Dysfunction 24 Tab 1     Patient Active Problem List   Diagnosis Code    Arthritis M19.90    Essential hypertension I10    Gastroesophageal reflux disease without esophagitis K21.9    PTSD (post-traumatic stress disorder) F43.10    Benign prostatic hyperplasia without lower urinary tract symptoms N40.0    Vertigo R42    Chronic bilateral low back pain without sciatica M54.5, G89.29    ACP (advance care planning) Z71.89    Lumbar spondylosis M47.816    Lumbar facet arthropathy M12.88    Degenerative disc disease at L5-S1 level M51.36    Spinal stenosis of lumbar region without neurogenic claudication M48.061    Chronic pain syndrome G89.4    Obesity, morbid (HCC) E66.01     Past Medical History:   Diagnosis Date    Arthritis     Headache     Hypercholesterolemia     Hypertension     PTSD (post-traumatic stress disorder)     [facility]      Social History     Social History    Marital status:      Spouse name: N/A    Number of children: N/A    Years of education: N/A     Social History Main Topics    Smoking status: Never Smoker    Smokeless tobacco: Never Used    Alcohol use No    Drug use: Not on file    Sexual activity: Not on file     Other Topics Concern    Not on file     Social History Narrative     No family history on file. Review of Systems   Constitutional: Negative for chills and fever. Cardiovascular: Negative for chest pain and palpitations. Gastrointestinal: Negative for constipation, diarrhea, nausea and vomiting. Vitals:    01/17/18 1341   BP: 144/84   Pulse: 84   Resp: 16   Temp: 98.4 °F (36.9 °C)   SpO2: 99%   Weight: 280 lb (127 kg)   Height: 5' 9\" (1.753 m)   PainSc:   4   PainLoc: Knee       Physical Exam   Constitutional: He is oriented to person, place, and time and well-developed, well-nourished, and in no distress. Neck: Normal range of motion. Neck supple.    Cardiovascular: Normal rate, regular rhythm and normal heart sounds. Pulmonary/Chest: Effort normal and breath sounds normal.   Neurological: He is alert and oriented to person, place, and time. Gait normal.   Skin: Skin is warm and dry. Nursing note and vitals reviewed. Assessment/Plan      ICD-10-CM ICD-9-CM    1. Arthritis M19.90 716.90 REFERRAL TO ORTHOPEDIC SURGERY   2. Essential hypertension I10 401.9    3. Gastroesophageal reflux disease without esophagitis K21.9 530.81 raNITIdine (ZANTAC) 150 mg tablet     I have discussed the diagnosis with the patient and the intended plan of care as seen in the above orders. The patient has received an after-visit summary and questions were answered concerning future plans. I have discussed medication, side effects, and warnings with the patient in detail. The patient verbalized understanding and is in agreement with the plan of care. The patient will contact the office with any additional concerns. Follow-up Disposition:  Return in about 3 months (around 4/17/2018).   lab results and schedule of future lab studies reviewed with patient      Mirella Engel MD

## 2018-01-17 NOTE — MR AVS SNAPSHOT
[facility] [address] [place] [address] 
[phone] Patient: Tori Lyons MRN: [id number]  Visit Information Date & Time Provider Department Dept. Phone Encounter #  
 2018  1:30 PM [facility] [phone] [id number] Follow-up Instructions Return in about 3 months (around 2018). Your Appointments 2018  8:15 AM  
Follow Up with Cammie Lewis MD  
[facility] Appt Note: 3mo fu appt. [address] [facility] [id number] [phone]  
  
   
 [address] [facility] [address] Upcoming Health Maintenance Date Due COLONOSCOPY 10/7/2020 DTaP/Tdap/Td series (2 - Td) 2027 Allergies as of 2018  Review Complete On: 2018 By: Irma Rodriguez MD  
 No Known Allergies Current Immunizations  Never Reviewed No immunizations on file. Not reviewed this visit You Were Diagnosed With   
  
 Codes Comments Arthritis    -  Primary ICD-10-CM: M19.90 ICD-9-CM: 716.90 Essential hypertension     ICD-10-CM: I10 
ICD-9-CM: 401.9 Gastroesophageal reflux disease without esophagitis     ICD-10-CM: K21.9 ICD-9-CM: 530.81 Vitals BP Pulse Temp Resp Height(growth percentile) Weight(growth percentile) 144/84 84 98.4 °F (36.9 °C) 16 5' 9\" (1.753 m) 280 lb (127 kg) SpO2 BMI Smoking Status 99% 41.35 kg/m2 Never Smoker BMI and BSA Data Body Mass Index Body Surface Area  
 41.35 kg/m 2 2.49 m 2 Preferred Pharmacy Pharmacy Name Phone [facility], [address] [facility] [phone] Your Updated Medication List  
  
   
This list is accurate as of: 18  1:50 PM.  Always use your most recent med list. amLODIPine 10 mg tablet Commonly known as:  [place] Otter Take 1 Tab by mouth daily. amoxicillin 500 mg Tab Take  by mouth. BOTOX 200 unit injection Generic drug:  onabotulinumtoxinA  
  
 celecoxib 200 mg capsule Commonly known as:  CELEBREX Take 1 Cap by mouth two (2) times a day. cetirizine 10 mg tablet Commonly known as:  ZYRTEC Take  by mouth. CINNAMON 500 mg Cap Generic drug:  cinnamon bark Take  by mouth. diclofenac 1 % Gel Commonly known as:  VOLTAREN Apply  to affected area four (4) times daily. Apply to both knees qid as directed  
  
 doxycycline 100 mg capsule Commonly known as:  Frankie Coca Take 100 mg by mouth daily. DULoxetine 30 mg capsule Commonly known as:  CYMBALTA Take 30 mg by mouth daily. finasteride 5 mg tablet Commonly known as:  PROSCAR Take 1 Tab by mouth daily. FISH OIL PO Take  by mouth.  
  
 gabapentin 300 mg capsule Commonly known as:  NEURONTIN Take 300 mg by mouth three (3) times daily. meclizine 25 mg tablet Commonly known as:  ANTIVERT Take 1 Tab by mouth daily. Omeprazole delayed release 20 mg tablet Commonly known as:  PRILOSEC D/R Take 1 Tab by mouth daily. ONE-A-DAY MEN VITACRAVES 200 mcg Chew Generic drug:  multivit with min-folic acid Take  by mouth. raNITIdine 150 mg tablet Commonly known as:  ZANTAC Take 1 Tab by mouth two (2) times a day. sertraline 100 mg tablet Commonly known as:  ZOLOFT Take  by mouth daily. sildenafil citrate 100 mg tablet Commonly known as:  VIAGRA Take 1 Tab by mouth as needed. Indications: Erectile Dysfunction  
  
 tamsulosin 0.4 mg capsule Commonly known as:  FLOMAX Take 0.4 mg by mouth daily. telmisartan-hydroCHLOROthiazide 80-12.5 mg per tablet Commonly known as:  MICARDIS HCT Take 1 Tab by mouth daily. traZODone 150 mg tablet Commonly known as:  Shahab Ramírez Take 150 mg by mouth nightly. Prescriptions Printed Refills  
 raNITIdine (ZANTAC) 150 mg tablet 3 Sig: Take 1 Tab by mouth two (2) times a day. Class: Print Route: Oral  
  
Follow-up Instructions Return in about 3 months (around 4/17/2018). Patient Instructions Gastroesophageal Reflux Disease (GERD): Care Instructions Your Care Instructions Gastroesophageal reflux disease (GERD) is the backward flow of stomach acid into the esophagus. The esophagus is the tube that leads from your throat to your stomach. A one-way valve prevents the stomach acid from moving up into this tube. When you have GERD, this valve does not close tightly enough. If you have mild GERD symptoms including heartburn, you may be able to control the problem with antacids or over-the-counter medicine. Changing your diet, losing weight, and making other lifestyle changes can also help reduce symptoms. Follow-up care is a key part of your treatment and safety. Be sure to make and go to all appointments, and call your doctor if you are having problems. It's also a good idea to know your test results and keep a list of the medicines you take. How can you care for yourself at home? · Take your medicines exactly as prescribed. Call your doctor if you think you are having a problem with your medicine. · Your doctor may recommend over-the-counter medicine. For mild or occasional indigestion, antacids, such as Tums, Gaviscon, Mylanta, or Maalox, may help. Your doctor also may recommend over-the-counter acid reducers, such as Pepcid AC, Tagamet HB, Zantac 75, or Prilosec. Read and follow all instructions on the label. If you use these medicines often, talk with your doctor. · Change your eating habits. ¨ It's best to eat several small meals instead of two or three large meals. ¨ After you eat, wait 2 to 3 hours before you lie down. ¨ Chocolate, mint, and alcohol can make GERD worse. ¨ Spicy foods, foods that have a lot of acid (like tomatoes and oranges), and coffee can make GERD symptoms worse in some people. If your symptoms are worse after you eat a certain food, you may want to stop eating that food to see if your symptoms get better. · Do not smoke or chew tobacco. Smoking can make GERD worse. If you need help quitting, talk to your doctor about stop-smoking programs and medicines. These can increase your chances of quitting for good. · If you have GERD symptoms at night, raise the head of your bed 6 to 8 inches by putting the frame on blocks or placing a foam wedge under the head of your mattress. (Adding extra pillows does not work.) · Do not wear tight clothing around your middle. · Lose weight if you need to. Losing just 5 to 10 pounds can help. When should you call for help? Call your doctor now or seek immediate medical care if: 
? · You have new or different belly pain. ? · Your stools are black and tarlike or have streaks of blood. ? Watch closely for changes in your health, and be sure to contact your doctor if: 
? · Your symptoms have not improved after 2 days. ? · Food seems to catch in your throat or chest.  
Where can you learn more? Go to http://neel-stone.info/. Enter U795 in the search box to learn more about \"Gastroesophageal Reflux Disease (GERD): Care Instructions. \" Current as of: May 12, 2017 Content Version: 11.4 © 7533-5904 [employer]. Care instructions adapted under license by [employer] (which disclaims liability or warranty for this information). If you have questions about a medical condition or this instruction, always ask your healthcare professional. [place] [address] any warranty or liability for your use of this information. Introducing [facility] & HEALTH SERVICES!    
 [facility] introduces Chiasma patient portal. Now you can access parts of your medical record, email your doctor's office, and request medication refills online. 1. In your internet browser, go to [email or web address] 2. Click on the First Time User? Click Here link in the Sign In box. You will see the New Member Sign Up page. 3. Enter your [employer]iv[employer] Access Code exactly as it appears below. You will not need to use this code after youve completed the sign-up process. If you do not sign up before the expiration date, you must request a new code. · [employer] Access Code: [email or web address] Expires: 1/28/2018  7:21 AM 
 
4. Enter the last four digits of your Social Security Number (xxxx) and Date of Birth (mm/dd/yyyy) as indicated and click Submit. You will be taken to the next sign-up page. 5. Create a Tivoli [employer] ID. This will be your [employer] login ID and cannot be changed, so think of one that is secure and easy to remember. 6. Create a [employer] password. You can change your password at any time. 7. Enter your Password Reset Question and Answer. This can be used at a later time if you forget your password. 8. Enter your e-mail address. You will receive e-mail notification when new information is available in [address] 9. Click Sign Up. You can now view and download portions of your medical record. 10. Click the Download Summary menu link to download a portable copy of your medical information. If you have questions, please visit the Frequently Asked Questions section of the [employer] website. Remember, [employer] is NOT to be used for urgent needs. For medical emergencies, dial 911. Now available from your iPhone and Android! Please provide this summary of care documentation to your next provider. Your primary care clinician is listed as [address]. If you have any questions after today's visit, please call [phone].

## 2018-01-17 NOTE — PATIENT INSTRUCTIONS

## 2018-01-17 NOTE — PROGRESS NOTES
Chief Complaint   Patient presents with    Knee Pain     go over mri    Hypertension     1. Have you been to the ER, urgent care clinic since your last visit? Hospitalized since your last visit? No    2. Have you seen or consulted any other health care providers outside of the [address] [place] [address] since your last visit? Include any pap smears or colon screening.  No

## 2018-01-19 ENCOUNTER — OFFICE VISIT (OUTPATIENT)
Dept: ORTHOPEDIC SURGERY | Age: 63
End: 2018-01-19

## 2018-01-19 VITALS
BODY MASS INDEX: 41.92 KG/M2 | HEIGHT: 69 IN | OXYGEN SATURATION: 93 % | SYSTOLIC BLOOD PRESSURE: 119 MMHG | TEMPERATURE: 97 F | HEART RATE: 82 BPM | WEIGHT: 283 LBS | DIASTOLIC BLOOD PRESSURE: 81 MMHG

## 2018-01-19 DIAGNOSIS — S83.231A COMPLEX TEAR OF MEDIAL MENISCUS OF RIGHT KNEE, UNSPECIFIED WHETHER OLD OR CURRENT TEAR, INITIAL ENCOUNTER: ICD-10-CM

## 2018-01-19 DIAGNOSIS — M17.11 ARTHRITIS OF RIGHT KNEE: ICD-10-CM

## 2018-01-19 DIAGNOSIS — Z96.662 HX OF TOTAL ANKLE REPLACEMENT, LEFT: ICD-10-CM

## 2018-01-19 DIAGNOSIS — M25.561 RIGHT KNEE PAIN, UNSPECIFIED CHRONICITY: Primary | ICD-10-CM

## 2018-01-19 RX ORDER — METHYLPREDNISOLONE ACETATE 40 MG/ML
40 INJECTION, SUSPENSION INTRA-ARTICULAR; INTRALESIONAL; INTRAMUSCULAR; SOFT TISSUE ONCE
Qty: 1 VIAL | Refills: 0
Start: 2018-01-19 | End: 2018-01-19

## 2018-01-19 NOTE — PATIENT INSTRUCTIONS
Joint Pain: Care Instructions  Your Care Instructions    Many people have small aches and pains from overuse or injury to muscles and joints. Joint injuries often happen during sports or recreation, work tasks, or projects around the home. An overuse injury can happen when you put too much stress on a joint or when you do an activity that stresses the joint over and over, such as using the computer or rowing a boat. You can take action at home to help your muscles and joints get better. You should feel better in 1 to 2 weeks, but it can take 3 months or more to heal completely. Follow-up care is a key part of your treatment and safety. Be sure to make and go to all appointments, and call your doctor if you are having problems. It's also a good idea to know your test results and keep a list of the medicines you take. How can you care for yourself at home? · Do not put weight on the injured joint for at least a day or two. · For the first day or two after an injury, do not take hot showers or baths, and do not use hot packs. The heat could make swelling worse. · Put ice or a cold pack on the sore joint for 10 to 20 minutes at a time. Try to do this every 1 to 2 hours for the next 3 days (when you are awake) or until the swelling goes down. Put a thin cloth between the ice and your skin. · Wrap the injury in an elastic bandage. Do not wrap it too tightly because this can cause more swelling. · Prop up the sore joint on a pillow when you ice it or anytime you sit or lie down during the next 3 days. Try to keep it above the level of your heart. This will help reduce swelling. · Take an over-the-counter pain medicine, such as acetaminophen (Tylenol), ibuprofen (Advil, Motrin), or naproxen (Aleve). Read and follow all instructions on the label. · After 1 or 2 days of rest, begin moving the joint gently.  While the joint is still healing, you can begin to exercise using activities that do not strain or hurt the painful joint. When should you call for help? Call your doctor now or seek immediate medical care if:  ? · You have signs of infection, such as:  ¨ Increased pain, swelling, warmth, and redness. ¨ Red streaks leading from the joint. ¨ A fever. ? Watch closely for changes in your health, and be sure to contact your doctor if:  ? · Your movement or symptoms are not getting better after 1 to 2 weeks of home treatment. Where can you learn more? Go to http://neel-stone.info/. Enter P205 in the search box to learn more about \"Joint Pain: Care Instructions. \"  Current as of: March 21, 2017  Content Version: 11.4  © 6150-9860 [employer]. Care instructions adapted under license by [employer] (which disclaims liability or warranty for this information). If you have questions about a medical condition or this instruction, always ask your healthcare professional. [address] any warranty or liability for your use of this information.

## 2018-01-19 NOTE — MR AVS SNAPSHOT
[address], [address] 200 [facility] 
[phone] Patient: Taylor Doss MRN: [id number] Visit Information Date & Time Provider Department Dept. Phone Encounter #  
 1/19/2018  9:30 AM Kristen Moreira  [facility], [address] [facility] [phone] [id number] Your Appointments 2/12/2018  8:15 AM  
Follow Up with Broderick Miller MD  
[facility] Appt Note: 3mo fu appt. [address] [facility] [address] [phone]  
  
   
 [address] [facility] [address] Upcoming Health Maintenance Date Due COLONOSCOPY 10/7/2020 DTaP/Tdap/Td series (2 - Td) 6/29/2027 Allergies as of 1/19/2018  Review Complete On: 1/19/2018 By: Kristen Moreira MD  
 No Known Allergies Current Immunizations  Never Reviewed No immunizations on file. Not reviewed this visit You Were Diagnosed With   
  
 Codes Comments Right knee pain, unspecified chronicity    -  Primary ICD-10-CM: M25.561 ICD-9-CM: 719.46 Vitals BP Pulse Temp Height(growth percentile) Weight(growth percentile) SpO2  
 119/81 (BP 1 Location: Left arm, BP Patient Position: Sitting) 82 97 °F (36.1 °C) 5' 9\" (1.753 m) 283 lb (128.4 kg) 93% BMI Smoking Status 41.79 kg/m2 Never Smoker BMI and BSA Data Body Mass Index Body Surface Area 41.79 kg/m 2 2.5 m 2 Preferred Pharmacy Pharmacy Name Phone [facility], [address] [facility] [phone] Your Updated Medication List  
  
   
This list is accurate as of: 1/19/18 10:11 AM.  Always use your most recent med list. amLODIPine 10 mg tablet Commonly known as:  Senora Mindy Take 1 Tab by mouth daily. amoxicillin 500 mg Tab Take  by mouth. BOTOX 200 unit injection Generic drug:  onabotulinumtoxinA  
  
 celecoxib 200 mg capsule Commonly known as:  CELEBREX Take 1 Cap by mouth two (2) times a day. cetirizine 10 mg tablet Commonly known as:  ZYRTEC Take  by mouth. CINNAMON 500 mg Cap Generic drug:  cinnamon bark Take  by mouth. diclofenac 1 % Gel Commonly known as:  VOLTAREN Apply  to affected area four (4) times daily. Apply to both knees qid as directed  
  
 doxycycline 100 mg capsule Commonly known as:  Milli Proffer Take 100 mg by mouth daily. DULoxetine 30 mg capsule Commonly known as:  CYMBALTA Take 30 mg by mouth daily. finasteride 5 mg tablet Commonly known as:  PROSCAR Take 1 Tab by mouth daily. FISH OIL PO Take  by mouth.  
  
 gabapentin 300 mg capsule Commonly known as:  NEURONTIN Take 300 mg by mouth three (3) times daily. meclizine 25 mg tablet Commonly known as:  ANTIVERT Take 1 Tab by mouth daily. Omeprazole delayed release 20 mg tablet Commonly known as:  PRILOSEC D/R Take 1 Tab by mouth daily. ONE-A-DAY MEN VITACRAVES 200 mcg Chew Generic drug:  multivit with min-folic acid Take  by mouth. raNITIdine 150 mg tablet Commonly known as:  ZANTAC Take 1 Tab by mouth two (2) times a day. sertraline 100 mg tablet Commonly known as:  ZOLOFT Take  by mouth daily. sildenafil citrate 100 mg tablet Commonly known as:  VIAGRA Take 1 Tab by mouth as needed. Indications: Erectile Dysfunction  
  
 tamsulosin 0.4 mg capsule Commonly known as:  FLOMAX Take 0.4 mg by mouth daily. telmisartan-hydroCHLOROthiazide 80-12.5 mg per tablet Commonly known as:  MICARDIS HCT Take 1 Tab by mouth daily. traZODone 150 mg tablet Commonly known as:  Iker Bristle Take 150 mg by mouth nightly. We Performed the Following AMB POC XRAY, KNEE; 1/2 VIEWS [26702 CPT(R)] Patient Instructions Joint Pain: Care Instructions Your Care Instructions Many people have small aches and pains from overuse or injury to muscles and joints. Joint injuries often happen during sports or recreation, work tasks, or projects around the home. An overuse injury can happen when you put too much stress on a joint or when you do an activity that stresses the joint over and over, such as using the computer or rowing a boat. You can take action at home to help your muscles and joints get better. You should feel better in 1 to 2 weeks, but it can take 3 months or more to heal completely. Follow-up care is a key part of your treatment and safety. Be sure to make and go to all appointments, and call your doctor if you are having problems. It's also a good idea to know your test results and keep a list of the medicines you take. How can you care for yourself at home? · Do not put weight on the injured joint for at least a day or two. · For the first day or two after an injury, do not take hot showers or baths, and do not use hot packs. The heat could make swelling worse. · Put ice or a cold pack on the sore joint for 10 to 20 minutes at a time. Try to do this every 1 to 2 hours for the next 3 days (when you are awake) or until the swelling goes down. Put a thin cloth between the ice and your skin. · Wrap the injury in an elastic bandage. Do not wrap it too tightly because this can cause more swelling. · Prop up the sore joint on a pillow when you ice it or anytime you sit or lie down during the next 3 days. Try to keep it above the level of your heart. This will help reduce swelling. · Take an over-the-counter pain medicine, such as acetaminophen (Tylenol), ibuprofen (Advil, Motrin), or naproxen (Aleve). Read and follow all instructions on the label. · After 1 or 2 days of rest, begin moving the joint gently. While the joint is still healing, you can begin to exercise using activities that do not strain or hurt the painful joint. When should you call for help? Call your doctor now or seek immediate medical care if: 
? · You have signs of infection, such as: 
¨ Increased pain, swelling, warmth, and redness. ¨ Red streaks leading from the joint. ¨ A fever. ? Watch closely for changes in your health, and be sure to contact your doctor if: 
? · Your movement or symptoms are not getting better after 1 to 2 weeks of home treatment. Where can you learn more? Go to http://neel-stone.info/. Enter P205 in the search box to learn more about \"Joint Pain: Care Instructions. \" Current as of: March 21, 2017 Content Version: 11.4 © 4468-3063 [employer]. Care instructions adapted under license by [employer] (which disclaims liability or warranty for this information). If you have questions about a medical condition or this instruction, always ask your healthcare professional. [place] [address] any warranty or liability for your use of this information. Introducing hospitals & HEALTH SERVICES! Dear Annita Ashraf: Thank you for requesting a [employer] account. Our records indicate that you already have an active [employer] account. You can access your account anytime at [email or web address] Did you know that you can access your hospital and ER discharge instructions at any time in [employer]? You can also review all of your test results from your hospital stay or ER visit. Additional Information If you have questions, please visit the Frequently Asked Questions section of the [employer] website at [email or web address] Remember, [employer] is NOT to be used for urgent needs. For medical emergencies, dial 911. Now available from your iPhone and Android! Please provide this summary of care documentation to your next provider. Your primary care clinician is listed as [address].  If you have any questions after today's visit, please call [phone].

## 2018-01-19 NOTE — PROGRESS NOTES
HISTORY OF PRESENT ILLNESS: Mr. Fernando Ivy is a 79-year-old, -American male who is here for consultation regarding right knee pain. He has a long history of left ankle problems. 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 He subsequently had the left ankle replaced a few years ago in [place]. He still notes some pain in his left ankle. Over the years, he has limped on the left leg and put a lot of excessive pressure on the right lower extremity. He is now here with complaints of pain in the right knee. He points to pain along the medial aspect of the right knee. He does not describe mechanical locking symptoms of the right knee. He does use a cane in his right hand for ambulation assistance, but this is more because of the left ankle pain. He has not worn a brace on the right knee. He has not complained of instability of the right knee. He does take Celebrex medication which helped some of his arthritic symptoms, but not necessarily his ankle or his knee. It does help his hand arthritis. He denies radiating leg pain. He denies pain in the right hip. He has not worn a brace on the right knee. He has had a recent MRI scan of the right knee, but no recent x-rays of the right knee. His pain is at a level of 5/10 on a daily basis and is aggravated by weightbearing activities. His pain is beginning to interfere with daily activities. PHYSICAL EXAMINATION:  Reveals an overweight, 79-year-old, -American male in mild discomfort. He ambulates with a waddling-type gait with a cane in his right hand. He really does not want to put full weight on either the right leg or the left leg. He is alert and oriented x 3 and answers questions appropriately. With reference to the right knee, he does have a mild effusion of the right knee. He has a slight varus deformity of the right knee which is correctable to neutral position passively.   He also has a little laxity of the medial collateral ligament with valgus stress testing. This is almost symmetrical to the left knee however. He does not have tenderness along the medial collateral ligament. He has no palpable lateral joint line tenderness. Nicolass test results in pain along the medial joint line, but no palpable click. He has satisfactory lateral collateral ligament stability. Cruciate ligament stability is intact. Anterior, as well as posterior drawer tests are negative. Anterior pivot shift test is negative. He has a good range of motion of the right knee from full extension to flexion of about 120º but this is accompanied by crepitus in the patellofemoral area. He has no right calf tenderness or swelling. Neurovascular testing is intact to the right foot distally. He has normal passive range of motion of the right hip without discomfort. Right hip roll test is negative. RADIOGRAPHS: X-rays of his right knee taken today reveal moderate to severe osteoarthritis of the right knee. He has over 50% narrowing of the medial joint space on standing AP radiographs. He has a radiographic genu varus deformity. He has increased sclerosis in the medial compartment and small osteophytes along the medial femoral condyle. His MRI scan of the right knee taken recently is also consistent with degenerative changes in the medial compartment with a small posterior horn medial meniscus tear. There is also thickening of the medial collateral ligament consistent with an old injury and this is consistent with his examination today. He has a mild effusion of the right knee on MRI scan. PROCEDURE:  Under sterile technique following his permission, consent, and time-out, a Cortisone injection was done to the medial compartment of the right knee. He tolerated the procedure well. IMPRESSION:   1. Degenerative medial meniscus tear, right knee. 2. Osteoarthritis, right knee, medial compartment.   3. Status post left total ankle arthroplasty. RECOMMENDATIONS:  I discussed with the patient and reviewed with him his clinical findings as well as radiographic studies. At this point he is on appropriate medication. I have recommended a Cortisone injection to his right knee today. I will check him back in a few weeks and see if the injection helped him. Other considerations can be made for arthroscopic surgery of his right knee for debridement of this meniscal pathology. I have told him however he will develop some progressive arthritis in the right knee. With time he may be a candidate for knee arthroplasty surgery as his symptoms and radiographs progress. He should continue using the cane for ambulation assistance. I have also discussed with him the importance of weight reduction. All of his questions are answered today.                  Vitals:    01/19/18 0928   BP: 119/81   Pulse: 82   Temp: 97 °F (36.1 °C)   SpO2: 93%   Weight: 283 lb (128.4 kg)   Height: 5' 9\" (1.753 m)       Patient Active Problem List   Diagnosis Code    Arthritis M19.90    Essential hypertension I10    Gastroesophageal reflux disease without esophagitis K21.9    PTSD (post-traumatic stress disorder) F43.10    Benign prostatic hyperplasia without lower urinary tract symptoms N40.0    Vertigo R42    Chronic bilateral low back pain without sciatica M54.5, G89.29    ACP (advance care planning) Z71.89    Lumbar spondylosis M47.816    Lumbar facet arthropathy M12.88    Degenerative disc disease at L5-S1 level M51.36    Spinal stenosis of lumbar region without neurogenic claudication M48.061    Chronic pain syndrome G89.4    Obesity, morbid ([facility]) E66.01     Patient Active Problem List    Diagnosis Date Noted    Obesity, morbid ([facility] [address] 12/26/2017    Lumbar spondylosis 10/26/2017    Lumbar facet arthropathy 10/26/2017    Degenerative disc disease at L5-S1 level 10/26/2017    Spinal stenosis of lumbar region without neurogenic claudication 10/26/2017    Chronic pain syndrome 10/26/2017    ACP (advance care planning) 09/28/2017    Chronic bilateral low back pain without sciatica 06/29/2017    Arthritis 10/27/2016    Essential hypertension 10/27/2016    Gastroesophageal reflux disease without esophagitis 10/27/2016    PTSD (post-traumatic stress disorder) 10/27/2016    Benign prostatic hyperplasia without lower urinary tract symptoms 10/27/2016    Vertigo 10/27/2016     Current Outpatient Prescriptions   Medication Sig Dispense Refill    methylPREDNISolone acetate (DEPO-MEDROL) 40 mg/mL injection 1 mL by Intra artICUlar route once for 1 dose. 1 Vial 0    raNITIdine (ZANTAC) 150 mg tablet Take 1 Tab by mouth two (2) times a day. 180 Tab 3    diclofenac (VOLTAREN) 1 % gel Apply  to affected area four (4) times daily. Apply to both knees qid as directed 5 Each 3    BOTOX 200 unit injection       doxycycline (MONODOX) 100 mg capsule Take 100 mg by mouth daily.  amLODIPine (NORVASC) 10 mg tablet Take 1 Tab by mouth daily. 90 Tab 3    celecoxib (CELEBREX) 200 mg capsule Take 1 Cap by mouth two (2) times a day. 180 Cap 0    gabapentin (NEURONTIN) 300 mg capsule Take 300 mg by mouth three (3) times daily.  amoxicillin 500 mg tab Take  by mouth.  cinnamon bark (CINNAMON) 500 mg cap Take  by mouth.  cetirizine (ZYRTEC) 10 mg tablet Take  by mouth.  DULoxetine (CYMBALTA) 30 mg capsule Take 30 mg by mouth daily.  sertraline (ZOLOFT) 100 mg tablet Take  by mouth daily.  tamsulosin (FLOMAX) 0.4 mg capsule Take 0.4 mg by mouth daily.  traZODone (DESYREL) 150 mg tablet Take 150 mg by mouth nightly.  DOCOSAHEXANOIC ACID/EPA (FISH OIL PO) Take  by mouth.  multivit with min-folic acid (ONE-A-DAY MEN VITACRAVES) 200 mcg chew Take  by mouth.  telmisartan-hydroCHLOROthiazide (MICARDIS HCT) 80-12.5 mg per tablet Take 1 Tab by mouth daily.  90 Tab 1    finasteride (PROSCAR) 5 mg tablet Take 1 Tab by mouth daily. 90 Tab 1    meclizine (ANTIVERT) 25 mg tablet Take 1 Tab by mouth daily. 90 Tab 1    Omeprazole delayed release (PRILOSEC D/R) 20 mg tablet Take 1 Tab by mouth daily. 90 Tab 1    sildenafil citrate (VIAGRA) 100 mg tablet Take 1 Tab by mouth as needed. Indications: Erectile Dysfunction 24 Tab 1     No Known Allergies  Past Medical History:   Diagnosis Date    Arthritis     Headache     Hypercholesterolemia     Hypertension     PTSD (post-traumatic stress disorder)     TBI (traumatic brain injury) ([facility] [address]      Past Surgical History:   Procedure Laterality Date    HX ORTHOPAEDIC      HX OTHER SURGICAL  02/02/2017    tendon surgery      History reviewed. No pertinent family history.   Social History   Substance Use Topics    Smoking status: Never Smoker    Smokeless tobacco: Never Used    Alcohol use No                                                          Vitals:    01/19/18 0928   BP: 119/81   Pulse: 82   Temp: 97 °F (36.1 °C)   SpO2: 93%   Weight: 283 lb (128.4 kg)   Height: 5' 9\" (1.753 m)       Patient Active Problem List   Diagnosis Code    Arthritis M19.90    Essential hypertension I10    Gastroesophageal reflux disease without esophagitis K21.9    PTSD (post-traumatic stress disorder) F43.10    Benign prostatic hyperplasia without lower urinary tract symptoms N40.0    Vertigo R42    Chronic bilateral low back pain without sciatica M54.5, G89.29    ACP (advance care planning) Z71.89    Lumbar spondylosis M47.816    Lumbar facet arthropathy M12.88    Degenerative disc disease at L5-S1 level M51.36    Spinal stenosis of lumbar region without neurogenic claudication M48.061    Chronic pain syndrome G89.4    Obesity, morbid ([facility]) E66.01     Patient Active Problem List    Diagnosis Date Noted    Obesity, morbid ([facility] [address] 12/26/2017    Lumbar spondylosis 10/26/2017    Lumbar facet arthropathy 10/26/2017    Degenerative disc disease at L5-S1 level 10/26/2017    Spinal stenosis of lumbar region without neurogenic claudication 10/26/2017    Chronic pain syndrome 10/26/2017    ACP (advance care planning) 09/28/2017    Chronic bilateral low back pain without sciatica 06/29/2017    Arthritis 10/27/2016    Essential hypertension 10/27/2016    Gastroesophageal reflux disease without esophagitis 10/27/2016    PTSD (post-traumatic stress disorder) 10/27/2016    Benign prostatic hyperplasia without lower urinary tract symptoms 10/27/2016    Vertigo 10/27/2016     Current Outpatient Prescriptions   Medication Sig Dispense Refill    methylPREDNISolone acetate (DEPO-MEDROL) 40 mg/mL injection 1 mL by Intra artICUlar route once for 1 dose. 1 Vial 0    raNITIdine (ZANTAC) 150 mg tablet Take 1 Tab by mouth two (2) times a day. 180 Tab 3    diclofenac (VOLTAREN) 1 % gel Apply  to affected area four (4) times daily. Apply to both knees qid as directed 5 Each 3    BOTOX 200 unit injection       doxycycline (MONODOX) 100 mg capsule Take 100 mg by mouth daily.  amLODIPine (NORVASC) 10 mg tablet Take 1 Tab by mouth daily. 90 Tab 3    celecoxib (CELEBREX) 200 mg capsule Take 1 Cap by mouth two (2) times a day. 180 Cap 0    gabapentin (NEURONTIN) 300 mg capsule Take 300 mg by mouth three (3) times daily.  amoxicillin 500 mg tab Take  by mouth.  cinnamon bark (CINNAMON) 500 mg cap Take  by mouth.  cetirizine (ZYRTEC) 10 mg tablet Take  by mouth.  DULoxetine (CYMBALTA) 30 mg capsule Take 30 mg by mouth daily.  sertraline (ZOLOFT) 100 mg tablet Take  by mouth daily.  tamsulosin (FLOMAX) 0.4 mg capsule Take 0.4 mg by mouth daily.  traZODone (DESYREL) 150 mg tablet Take 150 mg by mouth nightly.  DOCOSAHEXANOIC ACID/EPA (FISH OIL PO) Take  by mouth.  multivit with min-folic acid (ONE-A-DAY MEN VITACRAVES) 200 mcg chew Take  by mouth.  telmisartan-hydroCHLOROthiazide (MICARDIS HCT) 80-12.5 mg per tablet Take 1 Tab by mouth daily.  90 Tab 1  finasteride (PROSCAR) 5 mg tablet Take 1 Tab by mouth daily. 90 Tab 1    meclizine (ANTIVERT) 25 mg tablet Take 1 Tab by mouth daily. 90 Tab 1    Omeprazole delayed release (PRILOSEC D/R) 20 mg tablet Take 1 Tab by mouth daily. 90 Tab 1    sildenafil citrate (VIAGRA) 100 mg tablet Take 1 Tab by mouth as needed. Indications: Erectile Dysfunction 24 Tab 1     No Known Allergies  Past Medical History:   Diagnosis Date    Arthritis     Headache     Hypercholesterolemia     Hypertension     PTSD (post-traumatic stress disorder)     TBI (traumatic brain injury) ([facility] [address]      Past Surgical History:   Procedure Laterality Date    HX ORTHOPAEDIC      HX OTHER SURGICAL  02/02/2017    tendon surgery      History reviewed. No pertinent family history.   Social History   Substance Use Topics    Smoking status: Never Smoker    Smokeless tobacco: Never Used    Alcohol use No

## 2018-02-09 ENCOUNTER — OFFICE VISIT (OUTPATIENT)
Dept: ORTHOPEDIC SURGERY | Age: 63
End: 2018-02-09

## 2018-02-09 VITALS
OXYGEN SATURATION: 98 % | TEMPERATURE: 96.5 F | WEIGHT: 279 LBS | HEART RATE: 68 BPM | HEIGHT: 69 IN | SYSTOLIC BLOOD PRESSURE: 99 MMHG | DIASTOLIC BLOOD PRESSURE: 60 MMHG | BODY MASS INDEX: 41.32 KG/M2

## 2018-02-09 DIAGNOSIS — M17.11 ARTHRITIS OF RIGHT KNEE: Primary | ICD-10-CM

## 2018-02-09 NOTE — PROGRESS NOTES
HISTORY OF PRESENT ILLNESS: Mr. Allison Friedman is a 70-year-old male here for follow-up with reference to his right knee. I injected his right knee with Cortisone recently and he is much better. He notes minimal pain in the right knee. He is utilizing a cane for ambulation assistance. He has resumed daily activities now without a problem. PHYSICAL EXAMINATION:  Reveals he has no significant effusion or soft tissue swelling of the right knee. He has good functional range of motion of the right knee from full extension to flexion of about 125º to 130º. He has no palpable medial or lateral joint line tenderness. Nicolass test is negative. Lachman test is negative. He has no right calf tenderness or swelling. Neurovascular testing is intact to the right foot distally. IMPRESSION: Osteoarthritis of the right knee. RECOMMENDATIONS:  At this point he is responding very well to conservative treatment. He is no longer using the Voltaren gel either on his right knee. If he has progressive pain in the knee in the future he is to notify me. The importance of weight reduction is discussed with the patient in an effort to preserve his natural knee. All of his questions were answered today. ADDENDUM:  I did offer a referral to the weight loss program upstairs here at [facility], but he does not want to do this yet at this time. Again I have encouraged him regarding weight reduction.                  Vitals:    02/09/18 0754   BP: 99/60   Pulse: 68   Temp: 96.5 °F (35.8 °C)   SpO2: 98%   Weight: 126.6 kg (279 lb)   Height: 5' 9\" (1.753 m)   PainSc:   2       Patient Active Problem List   Diagnosis Code    Arthritis M19.90    Essential hypertension I10    Gastroesophageal reflux disease without esophagitis K21.9    PTSD (post-traumatic stress disorder) F43.10    Benign prostatic hyperplasia without lower urinary tract symptoms N40.0    Vertigo R42    Chronic bilateral low back pain without sciatica M54.5, G89.29    ACP (advance care planning) Z71.89    Lumbar spondylosis M47.816    Lumbar facet arthropathy M12.88    Degenerative disc disease at L5-S1 level M51.36    Spinal stenosis of lumbar region without neurogenic claudication M48.061    Chronic pain syndrome G89.4    Obesity, morbid ([facility]) E66.01     Patient Active Problem List    Diagnosis Date Noted    Obesity, morbid ([facility] [address] 12/26/2017    Lumbar spondylosis 10/26/2017    Lumbar facet arthropathy 10/26/2017    Degenerative disc disease at L5-S1 level 10/26/2017    Spinal stenosis of lumbar region without neurogenic claudication 10/26/2017    Chronic pain syndrome 10/26/2017    ACP (advance care planning) 09/28/2017    Chronic bilateral low back pain without sciatica 06/29/2017    Arthritis 10/27/2016    Essential hypertension 10/27/2016    Gastroesophageal reflux disease without esophagitis 10/27/2016    PTSD (post-traumatic stress disorder) 10/27/2016    Benign prostatic hyperplasia without lower urinary tract symptoms 10/27/2016    Vertigo 10/27/2016     Current Outpatient Prescriptions   Medication Sig Dispense Refill    raNITIdine (ZANTAC) 150 mg tablet Take 1 Tab by mouth two (2) times a day. 180 Tab 3    BOTOX 200 unit injection       doxycycline (MONODOX) 100 mg capsule Take 100 mg by mouth daily.  amLODIPine (NORVASC) 10 mg tablet Take 1 Tab by mouth daily. 90 Tab 3    celecoxib (CELEBREX) 200 mg capsule Take 1 Cap by mouth two (2) times a day. 180 Cap 0    gabapentin (NEURONTIN) 300 mg capsule Take 300 mg by mouth three (3) times daily.  amoxicillin 500 mg tab Take  by mouth.  cinnamon bark (CINNAMON) 500 mg cap Take  by mouth.  cetirizine (ZYRTEC) 10 mg tablet Take  by mouth.  DULoxetine (CYMBALTA) 30 mg capsule Take 30 mg by mouth daily.  sertraline (ZOLOFT) 100 mg tablet Take  by mouth daily.  tamsulosin (FLOMAX) 0.4 mg capsule Take 0.4 mg by mouth daily.       traZODone (DESYREL) 150 mg tablet Take 150 mg by mouth nightly.  DOCOSAHEXANOIC ACID/EPA (FISH OIL PO) Take  by mouth.  multivit with min-folic acid (ONE-A-DAY MEN VITACRAVES) 200 mcg chew Take  by mouth.  telmisartan-hydroCHLOROthiazide (MICARDIS HCT) 80-12.5 mg per tablet Take 1 Tab by mouth daily. 90 Tab 1    finasteride (PROSCAR) 5 mg tablet Take 1 Tab by mouth daily. 90 Tab 1    meclizine (ANTIVERT) 25 mg tablet Take 1 Tab by mouth daily. 90 Tab 1    Omeprazole delayed release (PRILOSEC D/R) 20 mg tablet Take 1 Tab by mouth daily. 90 Tab 1    sildenafil citrate (VIAGRA) 100 mg tablet Take 1 Tab by mouth as needed. Indications: Erectile Dysfunction 24 Tab 1    diclofenac (VOLTAREN) 1 % gel Apply  to affected area four (4) times daily. Apply to both knees qid as directed 5 Each 3     No Known Allergies  Past Medical History:   Diagnosis Date    Arthritis     Headache     Hypercholesterolemia     Hypertension     PTSD (post-traumatic stress disorder)     TBI (traumatic brain injury) ([facility] [address]      Past Surgical History:   Procedure Laterality Date    HX ORTHOPAEDIC      HX OTHER SURGICAL  02/02/2017    tendon surgery      History reviewed. No pertinent family history.   Social History   Substance Use Topics    Smoking status: Never Smoker    Smokeless tobacco: Never Used    Alcohol use No                                                       Vitals:    02/09/18 0754   BP: 99/60   Pulse: 68   Temp: 96.5 °F (35.8 °C)   SpO2: 98%   Weight: 126.6 kg (279 lb)   Height: 5' 9\" (1.753 m)   PainSc:   2       Patient Active Problem List   Diagnosis Code    Arthritis M19.90    Essential hypertension I10    Gastroesophageal reflux disease without esophagitis K21.9    PTSD (post-traumatic stress disorder) F43.10    Benign prostatic hyperplasia without lower urinary tract symptoms N40.0    Vertigo R42    Chronic bilateral low back pain without sciatica M54.5, G89.29    ACP (advance care planning) Z71.89  Lumbar spondylosis M47.816    Lumbar facet arthropathy M12.88    Degenerative disc disease at L5-S1 level M51.36    Spinal stenosis of lumbar region without neurogenic claudication M48.061    Chronic pain syndrome G89.4    Obesity, morbid ([facility]) E66.01     Patient Active Problem List    Diagnosis Date Noted    Obesity, morbid ([facility] [address] 12/26/2017    Lumbar spondylosis 10/26/2017    Lumbar facet arthropathy 10/26/2017    Degenerative disc disease at L5-S1 level 10/26/2017    Spinal stenosis of lumbar region without neurogenic claudication 10/26/2017    Chronic pain syndrome 10/26/2017    ACP (advance care planning) 09/28/2017    Chronic bilateral low back pain without sciatica 06/29/2017    Arthritis 10/27/2016    Essential hypertension 10/27/2016    Gastroesophageal reflux disease without esophagitis 10/27/2016    PTSD (post-traumatic stress disorder) 10/27/2016    Benign prostatic hyperplasia without lower urinary tract symptoms 10/27/2016    Vertigo 10/27/2016     Current Outpatient Prescriptions   Medication Sig Dispense Refill    raNITIdine (ZANTAC) 150 mg tablet Take 1 Tab by mouth two (2) times a day. 180 Tab 3    BOTOX 200 unit injection       doxycycline (MONODOX) 100 mg capsule Take 100 mg by mouth daily.  amLODIPine (NORVASC) 10 mg tablet Take 1 Tab by mouth daily. 90 Tab 3    celecoxib (CELEBREX) 200 mg capsule Take 1 Cap by mouth two (2) times a day. 180 Cap 0    gabapentin (NEURONTIN) 300 mg capsule Take 300 mg by mouth three (3) times daily.  amoxicillin 500 mg tab Take  by mouth.  cinnamon bark (CINNAMON) 500 mg cap Take  by mouth.  cetirizine (ZYRTEC) 10 mg tablet Take  by mouth.  DULoxetine (CYMBALTA) 30 mg capsule Take 30 mg by mouth daily.  sertraline (ZOLOFT) 100 mg tablet Take  by mouth daily.  tamsulosin (FLOMAX) 0.4 mg capsule Take 0.4 mg by mouth daily.  traZODone (DESYREL) 150 mg tablet Take 150 mg by mouth nightly.       DOCOSAHEXANOIC ACID/EPA (FISH OIL PO) Take  by mouth.  multivit with min-folic acid (ONE-A-DAY MEN VITACRAVES) 200 mcg chew Take  by mouth.  telmisartan-hydroCHLOROthiazide (MICARDIS HCT) 80-12.5 mg per tablet Take 1 Tab by mouth daily. 90 Tab 1    finasteride (PROSCAR) 5 mg tablet Take 1 Tab by mouth daily. 90 Tab 1    meclizine (ANTIVERT) 25 mg tablet Take 1 Tab by mouth daily. 90 Tab 1    Omeprazole delayed release (PRILOSEC D/R) 20 mg tablet Take 1 Tab by mouth daily. 90 Tab 1    sildenafil citrate (VIAGRA) 100 mg tablet Take 1 Tab by mouth as needed. Indications: Erectile Dysfunction 24 Tab 1    diclofenac (VOLTAREN) 1 % gel Apply  to affected area four (4) times daily. Apply to both knees qid as directed 5 Each 3     No Known Allergies  Past Medical History:   Diagnosis Date    Arthritis     Headache     Hypercholesterolemia     Hypertension     PTSD (post-traumatic stress disorder)     TBI (traumatic brain injury) ([facility] [address]      Past Surgical History:   Procedure Laterality Date    HX ORTHOPAEDIC      HX OTHER SURGICAL  02/02/2017    tendon surgery      History reviewed. No pertinent family history.   Social History   Substance Use Topics    Smoking status: Never Smoker    Smokeless tobacco: Never Used    Alcohol use No                                          2

## 2018-02-09 NOTE — MR AVS SNAPSHOT
[address], [address] 200 [facility] 
[phone] Patient: Pari Strong MRN: [id number] Visit Information Date & Time Provider Department Dept. Phone Encounter #  
 2/9/2018  8:00 AM Reginaldo Franklin  [facility] [address] [facility] [phone] Your Appointments 2/12/2018  8:15 AM  
Follow Up with Sandy Aguirre MD  
[facility] Appt Note: 3mo fu appt. [address] [facility] [address] [phone]  
  
   
 [address] [facility] [facility] [address] Upcoming Health Maintenance Date Due COLONOSCOPY 10/7/2020 DTaP/Tdap/Td series (2 - Td) 6/29/2027 Allergies as of 2/9/2018  Review Complete On: 2/9/2018 By: Reginaldo Franklin MD  
 No Known Allergies Current Immunizations  Never Reviewed No immunizations on file. Not reviewed this visit Vitals BP Pulse Temp Height(growth percentile) Weight(growth percentile) SpO2  
 99/60 (BP 1 Location: Left arm, BP Patient Position: Sitting) 68 96.5 °F (35.8 °C) 5' 9\" (1.753 m) 279 lb (126.6 kg) 98% BMI Smoking Status 41.2 kg/m2 Never Smoker Vitals History BMI and BSA Data Body Mass Index Body Surface Area  
 41.2 kg/m 2 2.48 m 2 Preferred Pharmacy Pharmacy Name Phone [facility], [address] [facility] [phone] Your Updated Medication List  
  
   
This list is accurate as of: 2/9/18  8:12 AM.  Always use your most recent med list. amLODIPine 10 mg tablet Commonly known as:  Lorri Slate Take 1 Tab by mouth daily. amoxicillin 500 mg Tab Take  by mouth. BOTOX 200 unit injection Generic drug:  onabotulinumtoxinA  
  
 celecoxib 200 mg capsule Commonly known as:  CELEBREX Take 1 Cap by mouth two (2) times a day. cetirizine 10 mg tablet Commonly known as:  ZYRTEC Take  by mouth. CINNAMON 500 mg Cap Generic drug:  cinnamon bark Take  by mouth. diclofenac 1 % Gel Commonly known as:  VOLTAREN Apply  to affected area four (4) times daily. Apply to both knees qid as directed  
  
 doxycycline 100 mg capsule Commonly known as:  Jadyn Blackbird Take 100 mg by mouth daily. DULoxetine 30 mg capsule Commonly known as:  CYMBALTA Take 30 mg by mouth daily. finasteride 5 mg tablet Commonly known as:  PROSCAR Take 1 Tab by mouth daily. FISH OIL PO Take  by mouth.  
  
 gabapentin 300 mg capsule Commonly known as:  NEURONTIN Take 300 mg by mouth three (3) times daily. meclizine 25 mg tablet Commonly known as:  ANTIVERT Take 1 Tab by mouth daily. Omeprazole delayed release 20 mg tablet Commonly known as:  PRILOSEC D/R Take 1 Tab by mouth daily. ONE-A-DAY MEN VITACRAVES 200 mcg Chew Generic drug:  multivit with min-folic acid Take  by mouth. raNITIdine 150 mg tablet Commonly known as:  ZANTAC Take 1 Tab by mouth two (2) times a day. sertraline 100 mg tablet Commonly known as:  ZOLOFT Take  by mouth daily. sildenafil citrate 100 mg tablet Commonly known as:  VIAGRA Take 1 Tab by mouth as needed. Indications: Erectile Dysfunction  
  
 tamsulosin 0.4 mg capsule Commonly known as:  FLOMAX Take 0.4 mg by mouth daily. telmisartan-hydroCHLOROthiazide 80-12.5 mg per tablet Commonly known as:  MICARDIS HCT Take 1 Tab by mouth daily. traZODone 150 mg tablet Commonly known as:  Trenton Bowl Take 150 mg by mouth nightly. Introducing [facility] & HE[facility] SERVICES! Dear Zhen Florez: Thank you for requesting a "[employer] account. Our records indicate that you already have an active "[employer] account. You can access your account anytime at [email or web address]. [employer] Did you know that you can access your hospital and ER discharge instructions at any time in [employer]? You can also review all of your test results from your hospital stay or ER visit. Additional Information If you have questions, please visit the Frequently Asked Questions section of the [employer] website at [email or web address] Remember, [employer] is NOT to be used for urgent needs. For medical emergencies, dial 911. Now available from your iPhone and Android! Please provide this summary of care documentation to your next provider. Your primary care clinician is listed as [id number] [facility]. If you have any questions after today's visit, please call [phone].

## 2018-02-12 ENCOUNTER — OFFICE VISIT (OUTPATIENT)
Dept: ORTHOPEDIC SURGERY | Age: 63
End: 2018-02-12

## 2018-02-12 VITALS
DIASTOLIC BLOOD PRESSURE: 75 MMHG | RESPIRATION RATE: 18 BRPM | OXYGEN SATURATION: 97 % | SYSTOLIC BLOOD PRESSURE: 116 MMHG | TEMPERATURE: 97.7 F | HEART RATE: 87 BPM | BODY MASS INDEX: 41.62 KG/M2 | HEIGHT: 69 IN | WEIGHT: 281 LBS

## 2018-02-12 DIAGNOSIS — M47.816 LUMBAR SPONDYLOSIS: Primary | ICD-10-CM

## 2018-02-12 DIAGNOSIS — M62.838 MUSCLE SPASM: ICD-10-CM

## 2018-02-12 DIAGNOSIS — M17.0 OSTEOARTHRITIS OF BOTH KNEES, UNSPECIFIED OSTEOARTHRITIS TYPE: ICD-10-CM

## 2018-02-12 DIAGNOSIS — M47.816 LUMBAR FACET ARTHROPATHY: ICD-10-CM

## 2018-02-12 RX ORDER — DICLOFENAC SODIUM 10 MG/G
GEL TOPICAL 4 TIMES DAILY
Qty: 5 EACH | Refills: 3 | Status: SHIPPED | OUTPATIENT
Start: 2018-02-12

## 2018-02-12 NOTE — MR AVS SNAPSHOT
[address] [place] [address] [address] [place] [address] 
[phone] Patient: Scotty Angela MRN: [id number] Visit Information Date & Time Provider Department Dept. Phone Encounter #  
 2/12/2018  8:15 AM Akshat Lew MD [facility] [id number] Follow-up Instructions Return in about 5 months (around 7/12/2018) for Medication follow up. Upcoming Health Maintenance Date Due COLONOSCOPY 10/7/2020 DTaP/Tdap/Td series (2 - Td) 6/29/2027 Allergies as of 2/12/2018  Review Complete On: 2/12/2018 By: Akshat Lew MD  
 No Known Allergies Current Immunizations  Never Reviewed No immunizations on file. Not reviewed this visit You Were Diagnosed With   
  
 Codes Comments Lumbar spondylosis    -  Primary ICD-10-CM: V55.057 ICD-9-CM: 721.3 Osteoarthritis of both knees, unspecified osteoarthritis type     ICD-10-CM: M17.0 ICD-9-CM: 715.96 Lumbar facet arthropathy     ICD-10-CM: M12.88 ICD-9-CM: 721.3 Muscle spasm     ICD-10-CM: W38.275 ICD-9-CM: 728.85 Vitals BP Pulse Temp Resp Height(growth percentile) Weight(growth percentile) 116/75 87 97.7 °F (36.5 °C) (Oral) 18 5' 9\" (1.753 m) 281 lb (127.5 kg) SpO2 BMI Smoking Status 97% 41.5 kg/m2 Never Smoker Vitals History BMI and BSA Data Body Mass Index Body Surface Area 41.5 kg/m 2 2.49 m 2 Preferred Pharmacy Pharmacy Name Phone [phone], [phone] [facility] [phone] Your Updated Medication List  
  
   
This list is accurate as of: 2/12/18  9:16 AM.  Always use your most recent med list. amLODIPine 10 mg tablet Commonly known as:  Conner Spencer Take 1 Tab by mouth daily. amoxicillin 500 mg Tab Take  by mouth. BOTOX 200 unit injection Generic drug:  onabotulinumtoxinA celecoxib 200 mg capsule Commonly known as:  CELEBREX Take 1 Cap by mouth two (2) times a day. cetirizine 10 mg tablet Commonly known as:  ZYRTEC Take  by mouth. CINNAMON 500 mg Cap Generic drug:  cinnamon bark Take  by mouth. diclofenac 1 % Gel Commonly known as:  VOLTAREN Apply  to affected area four (4) times daily. Apply to both knees qid as directed  
  
 doxycycline 100 mg capsule Commonly known as:  Ning Shackleton Take 100 mg by mouth daily. DULoxetine 30 mg capsule Commonly known as:  CYMBALTA Take 30 mg by mouth daily. finasteride 5 mg tablet Commonly known as:  PROSCAR Take 1 Tab by mouth daily. FISH OIL PO Take  by mouth.  
  
 gabapentin 300 mg capsule Commonly known as:  NEURONTIN Take 300 mg by mouth three (3) times daily. meclizine 25 mg tablet Commonly known as:  ANTIVERT Take 1 Tab by mouth daily. Omeprazole delayed release 20 mg tablet Commonly known as:  PRILOSEC D/R Take 1 Tab by mouth daily. ONE-A-DAY MEN VITACRAVES 200 mcg Chew Generic drug:  multivit with min-folic acid Take  by mouth. raNITIdine 150 mg tablet Commonly known as:  ZANTAC Take 1 Tab by mouth two (2) times a day. sertraline 100 mg tablet Commonly known as:  ZOLOFT Take  by mouth daily. sildenafil citrate 100 mg tablet Commonly known as:  VIAGRA Take 1 Tab by mouth as needed. Indications: Erectile Dysfunction  
  
 tamsulosin 0.4 mg capsule Commonly known as:  FLOMAX Take 0.4 mg by mouth daily. telmisartan-hydroCHLOROthiazide 80-12.5 mg per tablet Commonly known as:  MICARDIS HCT Take 1 Tab by mouth daily. traZODone 150 mg tablet Commonly known as:  Rondi Margarita Take 150 mg by mouth nightly. Prescriptions Printed Refills  
 diclofenac (VOLTAREN) 1 % gel 3 Sig: Apply  to affected area four (4) times daily. Apply to both knees qid as directed Class: Print Route: Topical  
  
Follow-up Instructions Return in about 5 months (around 7/12/2018) for Medication follow up. Patient Instructions Low Back Arthritis: Exercises Your Care Instructions Here are some examples of typical rehabilitation exercises for your condition. Start each exercise slowly. Ease off the exercise if you start to have pain. Your doctor or physical therapist will tell you when you can start these exercises and which ones will work best for you. When you are not being active, find a comfortable position for rest. Some people are comfortable on the floor or a medium-firm bed with a small pillow under their head and another under their knees. Some people prefer to lie on their side with a pillow between their knees. Don't stay in one position for too long. Take short walks (10 to 20 minutes) every 2 to 3 hours. Avoid slopes, hills, and stairs until you feel better. Walk only distances you can manage without pain, especially leg pain. How to do the exercises Pelvic tilt 1. Lie on your back with your knees bent. 2. \"Brace\" your stomach-tighten your muscles by pulling in and imagining your belly button moving toward your spine. 3. Press your lower back into the floor. You should feel your hips and pelvis rock back. 4. Hold for 6 seconds while breathing smoothly. 5. Relax and allow your pelvis and hips to rock forward. 6. Repeat 8 to 12 times. Back stretches 1. Get down on your hands and knees on the floor. 2. Relax your head and allow it to droop. Round your back up toward the ceiling until you feel a nice stretch in your upper, middle, and lower back. Hold this stretch for as long as it feels comfortable, or about 15 to 30 seconds. 3. Return to the starting position with a flat back while you are on your hands and knees. 4. Let your back sway by pressing your stomach toward the floor. Lift your buttocks toward the ceiling. 5. Hold this position for 15 to 30 seconds. 6. Repeat 2 to 4 times. Follow-up care is a key part of your treatment and safety. Be sure to make and go to all appointments, and call your doctor if you are having problems. It's also a good idea to know your test results and keep a list of the medicines you take. Where can you learn more? Go to http://neel-stone.info/. Enter W863 in the search box to learn more about \"Low Back Arthritis: Exercises. \" Current as of: March 21, 2017 Content Version: 11.4 © 2001-9217 [employer]. Care instructions adapted under license by [employer] (which disclaims liability or warranty for this information). If you have questions about a medical condition or this instruction, always ask your healthcare professional. [address] any warranty or liability for your use of this information. Introducing [facility]! Dear Eleanor Boateng: Thank you for requesting a [employer]Lama account. Our records indicate that you already have an active eL[employer] account. You can access your account anytime at [email or web address]. Re[employer] Did you know that you can access your hospital and ER discharge instructions at any time in eLama? You can also review all of your test results from your hospital stay or ER visit. Additional Information If you have questions, please visit the Frequently Asked Questions section of the [employer] website at [email or web address] Remember, e[employer] is NOT to be used for urgent needs. For medical emergencies, dial 911. Now available from your iPhone and Android! Please provide this summary of care documentation to your next provider. Your primary care clinician is listed as [address]. If you have any questions after today's visit, please call [phone].

## 2018-02-12 NOTE — PATIENT INSTRUCTIONS

## 2018-02-12 NOTE — PROGRESS NOTES
[facility]  [address], [facility] [address]  Phone: [phone]  Fax: [phone]      ASSESSMENT   Diagnoses and all orders for this visit:    1. Lumbar spondylosis    2. Osteoarthritis of both knees, unspecified osteoarthritis type  -     diclofenac (VOLTAREN) 1 % gel; Apply  to affected area four (4) times daily. Apply to both knees qid as directed    3. Lumbar facet arthropathy    4. Muscle spasm         IMPRESSION AND PLAN:  Erin Inman is a 58 y.o. male with history of lumbar pain. He tried a bilateral L3-5 radiofrequency ablation with Dr. Master Welch with temporary relief. He has experienced relief since he received a right knee injection with Dr. Que Ruiz 3 weeks ago. Pt continues to take Cymbalta 30 mg 1 tab daily, Neurontin 300, and Celebrex 200 mg.    1) Pt was given information on lumbar arthritis exercises. 2) He received a refill of Voltaren 1% gel. 3) I recommended the patient try using his TEN's unit and Voltaren 1% gel on his lower back. 4) I encouraged the patient to try water exercise, pranav chi, and gentle yoga. 5) I will consider referring the patient to Dr. Master Welch for repeat lumbar RFA at his next office visit if needed. 6) Mr. Vergia Gaucher has a reminder for a \"due or due soon\" health maintenance. I have asked that he contact his primary care provider, Héctor Taylor MD, for follow-up on this health maintenance. 7)  demonstrated consistency with prescribing. 8) Pt will follow-up in 5 months or sooner if needed. HISTORY OF PRESENT ILLNESS:  Erin Inman is a 58 y.o. male with history of lumbar pain. Pt is doing well at this time. He tried a bilateral L3-5 radiofrequency ablation with Dr. Master Welch since his last office visit (with his last procedure around the end of the holiday season) with improvement.  Pt states that he experienced relief with the RFA for a couple weeks but has noticed that some of his pain has returned. He denies any weakness in the legs but admits to pain in the left ankle and right knee. Of note, he had a prior left ankle replacement. He had a right knee with Dr. Chico Villalobos and states that he discussed a right knee replacement. Pt has not used his Voltaren 1% gel since he received the right knee injection 3 weeks ago. He continues to take Cymbalta 30 mg 1 tab daily, Neurontin 300, and Celebrex 200 mg. Pt has a TENs unit at home. He states that he recently joint the CA and has access to a pool. Pt at this time desires to continue with current care. Pain Scale: 4/10    PCP: Rafael Cary MD       Past Medical History:   Diagnosis Date    Arthritis     Headache     Hypercholesterolemia     Hypertension     PTSD (post-traumatic stress disorder)     [facility]         Social History     Social History    Marital status:      Spouse name: N/A    Number of children: N/A    Years of education: N/A     Occupational History    Not on file. Social History Main Topics    Smoking status: Never Smoker    Smokeless tobacco: Never Used    Alcohol use No    Drug use: Not on file    Sexual activity: Not on file     Other Topics Concern    Not on file     Social History Narrative       Current Outpatient Prescriptions   Medication Sig Dispense Refill    diclofenac (VOLTAREN) 1 % gel Apply  to affected area four (4) times daily. Apply to both knees qid as directed 5 Each 3    raNITIdine (ZANTAC) 150 mg tablet Take 1 Tab by mouth two (2) times a day. 180 Tab 3    BOTOX 200 unit injection       doxycycline (MONODOX) 100 mg capsule Take 100 mg by mouth daily.  amLODIPine (NORVASC) 10 mg tablet Take 1 Tab by mouth daily. 90 Tab 3    celecoxib (CELEBREX) 200 mg capsule Take 1 Cap by mouth two (2) times a day. 180 Cap 0    gabapentin (NEURONTIN) 300 mg capsule Take 300 mg by mouth three (3) times daily.  amoxicillin 500 mg tab Take  by mouth.       cinnamon bark (CINNAMON) 500 mg cap Take  by mouth.  DULoxetine (CYMBALTA) 30 mg capsule Take 30 mg by mouth daily.  sertraline (ZOLOFT) 100 mg tablet Take  by mouth daily.  tamsulosin (FLOMAX) 0.4 mg capsule Take 0.4 mg by mouth daily.  traZODone (DESYREL) 150 mg tablet Take 150 mg by mouth nightly.  multivit with min-folic acid (ONE-A-DAY MEN VITACRAVES) 200 mcg chew Take  by mouth.  telmisartan-hydroCHLOROthiazide (MICARDIS HCT) 80-12.5 mg per tablet Take 1 Tab by mouth daily. 90 Tab 1    finasteride (PROSCAR) 5 mg tablet Take 1 Tab by mouth daily. 90 Tab 1    meclizine (ANTIVERT) 25 mg tablet Take 1 Tab by mouth daily. 90 Tab 1    Omeprazole delayed release (PRILOSEC D/R) 20 mg tablet Take 1 Tab by mouth daily. 90 Tab 1    sildenafil citrate (VIAGRA) 100 mg tablet Take 1 Tab by mouth as needed. Indications: Erectile Dysfunction 24 Tab 1    cetirizine (ZYRTEC) 10 mg tablet Take  by mouth.  DOCOSAHEXANOIC ACID/EPA (FISH OIL PO) Take  by mouth. No Known Allergies      REVIEW OF SYSTEMS    Constitutional: Negative for fever, chills, or weight change. Respiratory: Negative for cough or shortness of breath. Cardiovascular: Negative for chest pain or palpitations. Gastrointestinal: Negative for acid reflux, change in bowel habits, or constipation. Genitourinary: Negative for dysuria and flank pain. Musculoskeletal: Positive for lumbar pain. Skin: Negative for rash. Neurological: Negative for headaches, dizziness, or numbness. Endo/Heme/Allergies: Negative for increased bruising. Psychiatric/Behavioral: Negative for difficulty with sleep. PHYSICAL EXAMINATION  Visit Vitals    /75    Pulse 87    Temp 97.7 °F (36.5 °C) (Oral)    Resp 18    Ht 5' 9\" (1.753 m)    Wt 281 lb (127.5 kg)    SpO2 97%    BMI 41.5 kg/m2       Constitutional: Awake, alert, and in no acute distress. Neurological: 1+ symmetrical DTRs in the lower extremities.  Sensation to light touch is intact. Skin: warm, dry, and intact. Musculoskeletal: Tenderness to palpation in the lower lumbar region. Moderate pain with extension and axial loading. No pain with internal or external rotation of his hips. Negative straight leg raise bilaterally. Patient ambulates with the assistance of a single point cane. Hip Flex  Quads Hamstrings Ankle DF EHL Ankle PF   Right +4/5 +4/5 +4/5 +4/5 +4/5 +4/5   Left +4/5 +4/5 +4/5 +4/5 +4/5 +4/5     IMAGING:    Lumbar spine MRI from 12/26/2017 was personally reviewed with the patient and demonstrated:    Results from [place] encounter on 12/26/17   MRI KNEE RT WO CONT   Narrative Procedure: MRI of the right knee without contrast    CPT code: 95394    Indications: Chronic pain, sensation of grinding and instability. Arthritis. Comparisons: None. Technique: The following sequences were performed through the right knee:    1. Three plane FSE T2 weighted images with fat saturation. 2.  Sagittal FSE proton density with fat saturation. 3.  Coronal SE T1 or FSE PD weighted images without fat saturation. Findings:    Mild to moderate regional subcutaneous tissue edema mainly anteriorly. There is  also edema around the popliteus muscle belly. There is small to moderate caliber joint effusion, mainly suprapatellar. No  intra-articular loose body. Osseous structures/cartilage: Minor marginal spurring mainly at the  weightbearing joint lines. Full-thickness cartilage loss on the medial patellar facet with mild subchondral  marrow edema. There is also some full-thickness cartilage loss on the opposing  medial femoral trochlea with mild marrow edema. There is grade 3 cartilage loss at the central weightbearing medial femoral  condyle. Likely foci of full-thickness fissuring with faint subchondral marrow  edema. Menisci:     There is partial extrusion of the medial meniscus from the tibial plateau.  There  is some irregularity at the posterior corner of the medial meniscus with a  suspected radial tear as on sagittal images 20-21, and around axial image 15. Coronal images 8-9, although not as well seen given its orientation. There is  some adjacent meniscocapsular edema. There is also some mild upper surface  irregularity within the posterior horn of the medial meniscus, and within the  meniscal substance. Sagittal images 17-21. There is a small caliber radial tear of the free margin of the lateral meniscal  body, sagittal image 6, coronal image 11. Ligaments: The ACL and PCL are intact. There is thickening and edema of the MCL. Deep of the more distal portion of MCL  and partially enveloping distal extension of semimembranosus, there is a  slightly septated plaque like ganglion cyst or adventitial bursa that extends 4  cm caudad. Subcentimeter maximal thickness. Slightly wraps around the posterior  corner of the tibia near the level the pes anserinus insertion. The uppermost  extent of this focus is closest to the medial meniscus, but not clearly  communicating. The LCL complex is intact. Muscles/tendons: Extensor mechanism intact. Impression IMPRESSION[de-identified]    1. Partial extrusion of the medial meniscus with suspected radial tear at its  posterior corner. 2. Overlying grade 1 MCL sprain, likely chronic/degenerative. 3. Plaque like fluid collection in the region of the posterior medial corner of  the knee. Given its location, favors adventitial bursitis. Unlikely to reflect a  dissecting meniscal cyst.   4. Small radial tear within the lateral meniscus. 5. Moderate arthritis as discussed above. Thank you for this referral.      Written by Sonja Dodson, as dictated by Angie Arcos MD.  I, Dr. Angie Arcos confirm that all documentation is accurate.

## 2018-04-18 ENCOUNTER — OFFICE VISIT (OUTPATIENT)
Dept: ORTHOPEDIC SURGERY | Age: 63
End: 2018-04-18

## 2018-04-18 DIAGNOSIS — M17.11 OSTEOARTHRITIS OF RIGHT KNEE, UNSPECIFIED OSTEOARTHRITIS TYPE: ICD-10-CM

## 2018-04-18 DIAGNOSIS — M62.830 MUSCLE SPASM OF BACK: ICD-10-CM

## 2018-04-18 DIAGNOSIS — M48.061 SPINAL STENOSIS OF LUMBAR REGION WITHOUT NEUROGENIC CLAUDICATION: ICD-10-CM

## 2018-04-18 DIAGNOSIS — M47.816 LUMBAR FACET ARTHROPATHY: Primary | ICD-10-CM

## 2018-04-18 RX ORDER — PRAZOSIN HYDROCHLORIDE 5 MG/1
2 CAPSULE ORAL
COMMUNITY

## 2018-04-18 RX ORDER — ATORVASTATIN CALCIUM 40 MG/1
80 TABLET, FILM COATED ORAL DAILY
COMMUNITY

## 2018-04-18 NOTE — MR AVS SNAPSHOT
Alex Juanito 
 
 
 [facility] [address] [facility]ly [facility] [address] 
[phone] Patient: Dominique Melvin MRN: [id number] Visit Information Date & Time Provider Department Dept. Phone Encounter #  
 4/18/2018  8:00 AM Abby Orozco MD [address] [facility] [phone] [id number] Follow-up Instructions Return in about 5 months (around 9/18/2018). Upcoming Health Maintenance Date Due  
 MEDICARE YEARLY EXAM 9/29/2018 COLONOSCOPY 10/7/2020 DTaP/Tdap/Td series (2 - Td) 6/29/2027 Allergies as of 4/18/2018  Review Complete On: 4/18/2018 By: Abby Orozco MD  
 No Known Allergies Current Immunizations  Never Reviewed No immunizations on file. Not reviewed this visit You Were Diagnosed With   
  
 Codes Comments Lumbar facet arthropathy (HCC)    -  Primary ICD-10-CM: M46.96 
ICD-9-CM: 721.3 Muscle spasm of back     ICD-10-CM: W99.387 ICD-9-CM: 724.8 Spinal stenosis of lumbar region without neurogenic claudication     ICD-10-CM: M48.061 
ICD-9-CM: 724.02 Osteoarthritis of right knee, unspecified osteoarthritis type     ICD-10-CM: M17.11 ICD-9-CM: 715.96 Vitals Smoking Status Never Smoker Preferred Pharmacy Pharmacy Name Phone [facility] [address] [facility] [phone] Your Updated Medication List  
  
   
This list is accurate as of 4/18/18  8:58 AM.  Always use your most recent med list. amLODIPine 10 mg tablet Commonly known as:  Haig Monterey Take 1 Tab by mouth daily. amoxicillin 500 mg Tab Take  by mouth. atorvastatin 40 mg tablet Commonly known as:  LIPITOR Take  by mouth daily. BOTOX 200 unit injection Generic drug:  onabotulinumtoxinA  
  
 celecoxib 200 mg capsule Commonly known as:  CELEBREX Take 1 Cap by mouth two (2) times a day. CINNAMON 500 mg Cap Generic drug:  cinnamon bark Take  by mouth. diclofenac 1 % Gel Commonly known as:  VOLTAREN Apply  to affected area four (4) times daily. Apply to both knees qid as directed  
  
 doxycycline 100 mg capsule Commonly known as:  Jefferson Malta Take 100 mg by mouth daily. DULoxetine 30 mg capsule Commonly known as:  CYMBALTA Take 30 mg by mouth daily. finasteride 5 mg tablet Commonly known as:  PROSCAR Take 1 Tab by mouth daily. gabapentin 300 mg capsule Commonly known as:  NEURONTIN Take 300 mg by mouth three (3) times daily. meclizine 25 mg tablet Commonly known as:  ANTIVERT Take 1 Tab by mouth daily. Omeprazole delayed release 20 mg tablet Commonly known as:  PRILOSEC D/R Take 1 Tab by mouth daily. ONE-A-DAY MEN VITACRAVES 200 mcg Chew Generic drug:  multivit with min-folic acid Take  by mouth.  
  
 prazosin 5 mg capsule Commonly known as:  MINIPRESS Take  by mouth nightly. raNITIdine 150 mg tablet Commonly known as:  ZANTAC Take 1 Tab by mouth two (2) times a day. sildenafil citrate 100 mg tablet Commonly known as:  VIAGRA Take 1 Tab by mouth as needed. Indications: Erectile Dysfunction  
  
 tamsulosin 0.4 mg capsule Commonly known as:  FLOMAX Take 0.4 mg by mouth daily. telmisartan-hydroCHLOROthiazide 80-12.5 mg per tablet Commonly known as:  MICARDIS HCT Take 1 Tab by mouth daily. traZODone 150 mg tablet Commonly known as:  Christa  Take 150 mg by mouth nightly. Follow-up Instructions Return in about 5 months (around 9/18/2018). Patient Instructions Low Back Arthritis: Exercises Your Care Instructions Here are some examples of typical rehabilitation exercises for your condition. Start each exercise slowly. Ease off the exercise if you start to have pain.  
Your doctor or physical therapist will tell you when you can start these exercises and which ones will work best for you. When you are not being active, find a comfortable position for rest. Some people are comfortable on the floor or a medium-firm bed with a small pillow under their head and another under their knees. Some people prefer to lie on their side with a pillow between their knees. Don't stay in one position for too long. Take short walks (10 to 20 minutes) every 2 to 3 hours. Avoid slopes, hills, and stairs until you feel better. Walk only distances you can manage without pain, especially leg pain. How to do the exercises Pelvic tilt 1. Lie on your back with your knees bent. 2. \"Brace\" your stomach-tighten your muscles by pulling in and imagining your belly button moving toward your spine. 3. Press your lower back into the floor. You should feel your hips and pelvis rock back. 4. Hold for 6 seconds while breathing smoothly. 5. Relax and allow your pelvis and hips to rock forward. 6. Repeat 8 to 12 times. Back stretches 1. Get down on your hands and knees on the floor. 2. Relax your head and allow it to droop. Round your back up toward the ceiling until you feel a nice stretch in your upper, middle, and lower back. Hold this stretch for as long as it feels comfortable, or about 15 to 30 seconds. 3. Return to the starting position with a flat back while you are on your hands and knees. 4. Let your back sway by pressing your stomach toward the floor. Lift your buttocks toward the ceiling. 5. Hold this position for 15 to 30 seconds. 6. Repeat 2 to 4 times. Follow-up care is a key part of your treatment and safety. Be sure to make and go to all appointments, and call your doctor if you are having problems. It's also a good idea to know your test results and keep a list of the medicines you take. Where can you learn more? Go to http://neel-stone.info/. Enter T624 in the search box to learn more about \"Low Back Arthritis: Exercises. \" 
 Current as of: March 21, 2017 Content Version: 11.4 © 3188-8175 Healthwise, [employer]. Care instructions adapted under license by [employer] (which disclaims liability or warranty for this information). If you have questions about a medical condition or this instruction, always ask your healthcare professional. [address] any warranty or liability for your use of this information. Introducing [facility]! Dear Elzbieta Seek: Thank you for requesting a [employer] account. Our records indicate that you already have an active [employer] account. You can access your account anytime at [email or web address] Did you know that you can access your hospital and ER discharge instructions at any time in [employer]? You can also review all of your test results from your hospital stay or ER visit. Additional Information If you have questions, please visit the Frequently Asked Questions section of the [employer] website at [email or web address] Remember, [employer] is NOT to be used for urgent needs. For medical emergencies, dial 911. Now available from your iPhone and Android! Please provide this summary of care documentation to your next provider. Your primary care clinician is listed as [address]. If you have any questions after today's visit, please call [phone].

## 2018-04-18 NOTE — PATIENT INSTRUCTIONS

## 2018-04-18 NOTE — PROGRESS NOTES
[facility]  [address] [place], [facility] [address]  Phone: [phone]  Fax: [phone]    Pt's YOB: 1955    ASSESSMENT   Diagnoses and all orders for this visit:    1. Lumbar facet arthropathy ([address]    2. Muscle spasm of back    3. Spinal stenosis of lumbar region without neurogenic claudication    4. Osteoarthritis of right knee, unspecified osteoarthritis type         IMPRESSION AND PLAN:  Donnie Head is a 58 y.o. male with history of lumbar pain. He experiences stiffness in the lower back that is most severe upon waking. Pt also complains of right knee pain and has been followed by Dr. Ro Mann. He tried a lumbar RFA with Dr. Misty Banks in 01/2018 and states that his pain returned about 1 month later. 1) Pt was given information on lumbar arthritis exercises. 2) He will continue using Voltaren 1% gel as prescribed and does not need a refill at this time. 3) Pt will schedule a follow up appointment with Dr. Ro Mann regarding his right knee pain. 4) He will follow up with Dr. Misty Banks regarding a repeat RFA. 5) Mr. Joesph Fam has a reminder for a \"due or due soon\" health maintenance. I have asked that he contact his primary care provider, Tru Disla MD, for follow-up on this health maintenance. 6)  demonstrated consistency with prescribing. 7) Pt will follow-up in 5 months or sooner if needed. HISTORY OF PRESENT ILLNESS:  Donnie Head is a 58 y.o. male with history of lumbar pain. He complains of stiffness in the lower back that is most severe upon waking. Pt also complains of intermittent left hip/buttock pain. He has been followed by Dr. Ro Mann and last tried a steroid injection in 01/2018. Pt will follow up with Dr. Ro Mann in the near future since he is experiencing pain and popping in the right knee. He has discussed arthroscopic surgery and a right knee replacement with Dr. Ro Mann.  Pt denies any prior knee surgery. He tried a lumbar RFA with Dr. Heather Gamble in 01/2018 and states that his pain returned about 1 month later. Pt admits to increased pain with cold and rainy weather. He has been using Voltaren 1% on the right knee daily but reports minimal relief with the medication. Pt states that his right knee occasionally locks up on him when he is laying in bed. He denies any pain radiating down the right leg at this time. Of note, patient had a right ankle replacement in 09/2015 with a provider at the [facility]. Pt at this time desires to continue with current care. Of note, patient states that he will be making a trip to [facility] in 06/2018. Pain Scale: /10    PCP: Pietro Cardoza MD     Past Medical History:   Diagnosis Date    Arthritis     Headache     Hypercholesterolemia     Hypertension     PTSD (post-traumatic stress disorder)     TBI (traumatic brain injury) ([facility] [address]         Social History     Social History    Marital status:      Spouse name: N/A    Number of children: N/A    Years of education: N/A     Occupational History    Not on file. Social History Main Topics    Smoking status: Never Smoker    Smokeless tobacco: Never Used    Alcohol use No    Drug use: Not on file    Sexual activity: Not on file     Other Topics Concern    Not on file     Social History Narrative       Current Outpatient Prescriptions   Medication Sig Dispense Refill    atorvastatin (LIPITOR) 40 mg tablet Take  by mouth daily.  prazosin (MINIPRESS) 5 mg capsule Take  by mouth nightly.  diclofenac (VOLTAREN) 1 % gel Apply  to affected area four (4) times daily. Apply to both knees qid as directed 5 Each 3    raNITIdine (ZANTAC) 150 mg tablet Take 1 Tab by mouth two (2) times a day. 180 Tab 3    BOTOX 200 unit injection       doxycycline (MONODOX) 100 mg capsule Take 100 mg by mouth daily.  amLODIPine (NORVASC) 10 mg tablet Take 1 Tab by mouth daily.  90 Tab 3    celecoxib (CELEBREX) 200 mg capsule Take 1 Cap by mouth two (2) times a day. 180 Cap 0    gabapentin (NEURONTIN) 300 mg capsule Take 300 mg by mouth three (3) times daily.  cinnamon bark (CINNAMON) 500 mg cap Take  by mouth.  DULoxetine (CYMBALTA) 30 mg capsule Take 30 mg by mouth daily.  tamsulosin (FLOMAX) 0.4 mg capsule Take 0.4 mg by mouth daily.  traZODone (DESYREL) 150 mg tablet Take 150 mg by mouth nightly.  multivit with min-folic acid (ONE-A-DAY MEN VITACRAVES) 200 mcg chew Take  by mouth.  telmisartan-hydroCHLOROthiazide (MICARDIS HCT) 80-12.5 mg per tablet Take 1 Tab by mouth daily. 90 Tab 1    finasteride (PROSCAR) 5 mg tablet Take 1 Tab by mouth daily. 90 Tab 1    meclizine (ANTIVERT) 25 mg tablet Take 1 Tab by mouth daily. 90 Tab 1    Omeprazole delayed release (PRILOSEC D/R) 20 mg tablet Take 1 Tab by mouth daily. 90 Tab 1    sildenafil citrate (VIAGRA) 100 mg tablet Take 1 Tab by mouth as needed. Indications: Erectile Dysfunction 24 Tab 1    amoxicillin 500 mg tab Take  by mouth. No Known Allergies      REVIEW OF SYSTEMS    Constitutional: Negative for fever, chills, or weight change. Respiratory: Negative for cough or shortness of breath. Cardiovascular: Negative for chest pain or palpitations. Gastrointestinal: Negative for acid reflux, change in bowel habits, or constipation. Genitourinary: Negative for dysuria and flank pain. Musculoskeletal: Positive for lumbar and right knee pain. Skin: Negative for rash. Neurological: Negative for headaches, dizziness, or numbness. Endo/Heme/Allergies: Negative for increased bruising. Psychiatric/Behavioral: Negative for difficulty with sleep. PHYSICAL EXAMINATION  Visit Vitals    /69    Pulse 90    Temp 98.3 °F (36.8 °C)    Resp 16    Ht 5' 9\" (1.753 m)    Wt 287 lb (130.2 kg)    SpO2 98%    BMI 42.38 kg/m2       Constitutional: Awake, alert, and in no acute distress.   Neurological: 1+ symmetrical DTRs in the lower extremities. Sensation to light touch is intact. Skin: warm, dry, and intact. Musculoskeletal: Tenderness to palpation in the lower lumbar region. Moderate pain with extension and axial loading. No pain with internal or external rotation of his hips. Negative straight leg raise bilaterally. Patient ambulates with the assistance of a single point cane. Hip Flex  Quads Hamstrings Ankle DF EHL Ankle PF   Right +4/5 +4/5 +4/5 +4/5 +4/5 +4/5   Left +4/5 +4/5 +4/5 +4/5 +4/5 +4/5     IMAGING:    Lumbar spine MRI from 09/14/2017 was personally reviewed with the patient and demonstrated:  FINDINGS:  Normal lumbar spine alignment. Vertebral body heights maintained. Disc space  heights are maintained and hydrated. Normal bone marrow signal. Normal conus  medullaris signal terminating at T12/L1 level. There is developmental narrowing  of the lumbar spinal canal due to short pedicles.      Visualized lower thoracic levels T11-L1 demonstrate unremarkable discs with  patent canal and foramina.     Correlation of sagittal and axial images demonstrates the following:     T12/L1: Unremarkable disc. No central canal or foraminal stenosis. .     L1/2:  Unremarkable disc. No central canal or foraminal stenosis.     L2/3:  Unremarkable disc. Mild facet and ligamentum hypertrophy. Mild central  canal stenosis. No foraminal stenosis.     L3/4:  Minimal disc bulge. Mild facet and ligamentum hypertrophy. Mild central  canal stenosis. Mild foraminal stenosis     L4 level: At the level of L4 vertebral body there is abundant epidural fat and  moderate narrowing of the thecal sac.     L4/5:  Mild disc bulge with central disc protrusion. Moderate facet and  ligamentum hypertrophy. Moderate to severe central canal stenosis. Moderate  foraminal stenosis.     L5 level: At the level of L5 vertebral body, there is abundant epidural fat and  moderate to severe narrowing of the thecal sac.     L5/S1:  Mild disc bulge.  Moderate facet and ligamentum hypertrophy. Prominent  epidural fat. Lack of CSF and the tapering of the thecal sac. Moderate foraminal  stenosis.     Incompletely evaluated bilateral T2 hyperintense lesions in the kidneys up to 2  cm. IMPRESSION:       1. Mild/moderate degenerative changes in the lower lumbar spine superimposed on  developmentally small central canal and prominent epidural  fat resulting in  moderate to severe compression of the thecal sac from L3/4 disc level to L5/S1  as discussed. Moderate foraminal stenosis at L4/5 and L5/S1.     2. Bilateral renal lesions up to 2 cm, likely cysts and can be evaluated with  Ultrasound. Right knee MRI from 12/26/2017 was personally reviewed with the patient and demonstrated:    Results from [place] encounter on 12/26/17   MRI KNEE RT WO CONT   Narrative Procedure: MRI of the right knee without contrast    CPT code: 09873    Indications: Chronic pain, sensation of grinding and instability. Arthritis. Comparisons: None. Technique: The following sequences were performed through the right knee:    1. Three plane FSE T2 weighted images with fat saturation. 2.  Sagittal FSE proton density with fat saturation. 3.  Coronal SE T1 or FSE PD weighted images without fat saturation. Findings:    Mild to moderate regional subcutaneous tissue edema mainly anteriorly. There is  also edema around the popliteus muscle belly. There is small to moderate caliber joint effusion, mainly suprapatellar. No  intra-articular loose body. Osseous structures/cartilage: Minor marginal spurring mainly at the  weightbearing joint lines. Full-thickness cartilage loss on the medial patellar facet with mild subchondral  marrow edema. There is also some full-thickness cartilage loss on the opposing  medial femoral trochlea with mild marrow edema. There is grade 3 cartilage loss at the central weightbearing medial femoral  condyle.  Likely foci of full-thickness fissuring with faint subchondral marrow  edema. Menisci:     There is partial extrusion of the medial meniscus from the tibial plateau. There  is some irregularity at the posterior corner of the medial meniscus with a  suspected radial tear as on sagittal images 20-21, and around axial image 15. Coronal images 8-9, although not as well seen given its orientation. There is  some adjacent meniscocapsular edema. There is also some mild upper surface  irregularity within the posterior horn of the medial meniscus, and within the  meniscal substance. Sagittal images 17-21. There is a small caliber radial tear of the free margin of the lateral meniscal  body, sagittal image 6, coronal image 11. Ligaments: The ACL and PCL are intact. There is thickening and edema of the MCL. Deep of the more distal portion of MCL  and partially enveloping distal extension of semimembranosus, there is a  slightly septated plaque like ganglion cyst or adventitial bursa that extends 4  cm caudad. Subcentimeter maximal thickness. Slightly wraps around the posterior  corner of the tibia near the level the pes anserinus insertion. The uppermost  extent of this focus is closest to the medial meniscus, but not clearly  communicating. The LCL complex is intact. Muscles/tendons: Extensor mechanism intact. Impression IMPRESSION[de-identified]    1. Partial extrusion of the medial meniscus with suspected radial tear at its  posterior corner. 2. Overlying grade 1 MCL sprain, likely chronic/degenerative. 3. Plaque like fluid collection in the region of the posterior medial corner of  the knee. Given its location, favors adventitial bursitis. Unlikely to reflect a  dissecting meniscal cyst.   4. Small radial tear within the lateral meniscus. 5. Moderate arthritis as discussed above.     Thank you for this referral.        Written by Trini Turner, as dictated by Maritza Moore MD.  I, Dr. Maritza Moore confirm that all documentation is accurate.

## 2018-04-19 ENCOUNTER — HOSPITAL ENCOUNTER (OUTPATIENT)
Dept: LAB | Age: 63
Discharge: HOME | End: 2018-04-19
Payer: MEDICARE

## 2018-04-19 ENCOUNTER — OFFICE VISIT (OUTPATIENT)
Dept: ORTHOPEDIC SURGERY | Age: 63
End: 2018-04-19

## 2018-04-19 VITALS
HEIGHT: 69 IN | DIASTOLIC BLOOD PRESSURE: 83 MMHG | WEIGHT: 288 LBS | SYSTOLIC BLOOD PRESSURE: 125 MMHG | HEART RATE: 78 BPM | TEMPERATURE: 97.4 F | OXYGEN SATURATION: 97 % | BODY MASS INDEX: 42.65 KG/M2

## 2018-04-19 DIAGNOSIS — M17.11 ARTHRITIS OF RIGHT KNEE: Primary | ICD-10-CM

## 2018-04-19 DIAGNOSIS — Z01.818 PREOP EXAMINATION: Primary | ICD-10-CM

## 2018-04-19 DIAGNOSIS — S83.231A COMPLEX TEAR OF MEDIAL MENISCUS OF RIGHT KNEE, UNSPECIFIED WHETHER OLD OR CURRENT TEAR, INITIAL ENCOUNTER: ICD-10-CM

## 2018-04-19 DIAGNOSIS — Z01.818 PRE-OP EVALUATION: ICD-10-CM

## 2018-04-19 LAB
ALBUMIN SERPL-MCNC: 3.5 G/DL (ref 3.4–5)
ALBUMIN/GLOB SERPL: 1.1 {RATIO} (ref 0.8–1.7)
ALP SERPL-CCNC: 105 U/L (ref 45–117)
ALT SERPL-CCNC: 22 U/L (ref 16–61)
ANION GAP SERPL CALC-SCNC: 5 MMOL/L (ref 3–18)
AST SERPL-CCNC: 20 U/L (ref 15–37)
BASOPHILS # BLD: 0 K/UL (ref 0–0.1)
BASOPHILS NFR BLD: 0 % (ref 0–2)
BILIRUB SERPL-MCNC: 0.5 MG/DL (ref 0.2–1)
BUN SERPL-MCNC: 27 MG/DL (ref 7–18)
BUN/CREAT SERPL: 19 (ref 12–20)
CALCIUM SERPL-MCNC: 8.9 MG/DL (ref 8.5–10.1)
CHLORIDE SERPL-SCNC: 108 MMOL/L (ref 100–108)
CO2 SERPL-SCNC: 29 MMOL/L (ref 21–32)
CREAT SERPL-MCNC: 1.45 MG/DL (ref 0.6–1.3)
DIFFERENTIAL METHOD BLD: ABNORMAL
EOSINOPHIL # BLD: 0.1 K/UL (ref 0–0.4)
EOSINOPHIL NFR BLD: 2 % (ref 0–5)
ERYTHROCYTE [DISTWIDTH] IN BLOOD BY AUTOMATED COUNT: 15 % (ref 11.6–14.5)
GLOBULIN SER CALC-MCNC: 3.3 G/DL (ref 2–4)
GLUCOSE SERPL-MCNC: 89 MG/DL (ref 74–99)
HCT VFR BLD AUTO: 41.2 % (ref 36–48)
HGB BLD-MCNC: 13.4 G/DL (ref 13–16)
INR PPP: 1 (ref 0.8–1.2)
LYMPHOCYTES # BLD: 2.9 K/UL (ref 0.9–3.6)
LYMPHOCYTES NFR BLD: 34 % (ref 21–52)
MCH RBC QN AUTO: 28.9 PG (ref 24–34)
MCHC RBC AUTO-ENTMCNC: 32.5 G/DL (ref 31–37)
MCV RBC AUTO: 89 FL (ref 74–97)
MONOCYTES # BLD: 0.7 K/UL (ref 0.05–1.2)
MONOCYTES NFR BLD: 8 % (ref 3–10)
NEUTS SEG # BLD: 4.7 K/UL (ref 1.8–8)
NEUTS SEG NFR BLD: 56 % (ref 40–73)
PLATELET # BLD AUTO: 199 K/UL (ref 135–420)
PMV BLD AUTO: 13.3 FL (ref 9.2–11.8)
POTASSIUM SERPL-SCNC: 4.1 MMOL/L (ref 3.5–5.5)
PROT SERPL-MCNC: 6.8 G/DL (ref 6.4–8.2)
PROTHROMBIN TIME: 13 SEC (ref 11.5–15.2)
RBC # BLD AUTO: 4.63 M/UL (ref 4.7–5.5)
SODIUM SERPL-SCNC: 142 MMOL/L (ref 136–145)
WBC # BLD AUTO: 8.4 K/UL (ref 4.6–13.2)

## 2018-04-19 PROCEDURE — 93005 ELECTROCARDIOGRAM TRACING: CPT

## 2018-04-19 PROCEDURE — 36415 COLL VENOUS BLD VENIPUNCTURE: CPT | Performed by: ORTHOPAEDIC SURGERY

## 2018-04-19 PROCEDURE — 85025 COMPLETE CBC W/AUTO DIFF WBC: CPT | Performed by: ORTHOPAEDIC SURGERY

## 2018-04-19 PROCEDURE — 80053 COMPREHEN METABOLIC PANEL: CPT | Performed by: ORTHOPAEDIC SURGERY

## 2018-04-19 PROCEDURE — 85610 PROTHROMBIN TIME: CPT | Performed by: ORTHOPAEDIC SURGERY

## 2018-04-19 RX ORDER — ACETAMINOPHEN 325 MG/1
650 TABLET ORAL ONCE
Status: CANCELLED | OUTPATIENT
Start: 2018-04-19 | End: 2018-04-19

## 2018-04-19 NOTE — PROGRESS NOTES
History and Physical Performed today and documented in chart    Sherry Brady, [address]  4/19/2018.  4:27 PM

## 2018-04-19 NOTE — PROGRESS NOTES
HISTORY OF PRESENT ILLNESS: Mr. Tova Gatica is here for follow-up with his right knee. I saw him in early February. At that time he has some moderate arthritis in the right knee and MRI scan revealed some meniscal pathology in the right knee. At that time I injected his right knee with Cortisone which he states helped him for about six weeks. He has also been on Celebrex twice a day by his family physician for other arthralgias, but this is not really helping him much. He has had a couple of episodes of catching episodes in the right knee. This has occurred primarily at night when he rolls over in bed. He does point to pain along the medial aspect of the right knee. PHYSICAL EXAMINATION:  Reveals he has a minimal effusion of the right knee. He has palpable medial joint line tenderness. Nicolass test however is negative. Lachman test is negative. She has good collateral, as well as cruciate ligament stability to the right knee. Anterior and posterior drawer tests are negative. He has no right calf tenderness or swelling. He has good range of motion of the right knee from full extension to flexion of about 120º. Neurovascular testing is intact to the right foot distally. RADIOGRAPHS:  His x-rays and MRI scans are reviewed. He does have moderate osteoarthritis of the right knee with meniscal pathology. RECOMMENDATIONS:  I have discussed with him at this point probable arthroscopic surgery of his right knee for debridement of this medial compartment and meniscal pathology in view of his catching episodes in the right knee. After that we can certainly pursue some therapy and/or anti-inflammatory medications as necessary. He has to go to [facility] in June for a few weeks so we therefore want to get this surgery done so he can recover in time to go to [facility]. All of his questions were answered today. Risks and benefits of the arthroscopic surgery were discussed with the patient.   We will proceed with surgery shortly. Vitals:    04/19/18 1529   BP: 125/83   Pulse: 78   Temp: 97.4 °F (36.3 °C)   SpO2: 97%   Weight: 288 lb (130.6 kg)   Height: 5' 9\" (1.753 m)   PainSc:   4       Patient Active Problem List   Diagnosis Code    Arthritis M19.90    Essential hypertension I10    Gastroesophageal reflux disease without esophagitis K21.9    PTSD (post-traumatic stress disorder) F43.10    Benign prostatic hyperplasia without lower urinary tract symptoms N40.0    Vertigo R42    Chronic bilateral low back pain without sciatica M54.5, G89.29    ACP (advance care planning) Z71.89    Lumbar spondylosis M47.816    Lumbar facet arthropathy (HCC) M46.96    Degenerative disc disease at L5-S1 level M51.36    Spinal stenosis of lumbar region without neurogenic claudication M48.061    Chronic pain syndrome G89.4    Obesity, morbid (HCC) E66.01     Patient Active Problem List    Diagnosis Date Noted    Obesity, morbid ([address] 12/26/2017    Lumbar spondylosis 10/26/2017    Lumbar facet arthropathy ([address] 10/26/2017    Degenerative disc disease at L5-S1 level 10/26/2017    Spinal stenosis of lumbar region without neurogenic claudication 10/26/2017    Chronic pain syndrome 10/26/2017    ACP (advance care planning) 09/28/2017    Chronic bilateral low back pain without sciatica 06/29/2017    Arthritis 10/27/2016    Essential hypertension 10/27/2016    Gastroesophageal reflux disease without esophagitis 10/27/2016    PTSD (post-traumatic stress disorder) 10/27/2016    Benign prostatic hyperplasia without lower urinary tract symptoms 10/27/2016    Vertigo 10/27/2016     Current Outpatient Prescriptions   Medication Sig Dispense Refill    atorvastatin (LIPITOR) 40 mg tablet Take  by mouth daily.  prazosin (MINIPRESS) 5 mg capsule Take  by mouth nightly.  diclofenac (VOLTAREN) 1 % gel Apply  to affected area four (4) times daily.  Apply to both knees qid as directed 5 Each 3    raNITIdine (ZANTAC) 150 mg tablet Take 1 Tab by mouth two (2) times a day. 180 Tab 3    BOTOX 200 unit injection       doxycycline (MONODOX) 100 mg capsule Take 100 mg by mouth daily.  amLODIPine (NORVASC) 10 mg tablet Take 1 Tab by mouth daily. 90 Tab 3    celecoxib (CELEBREX) 200 mg capsule Take 1 Cap by mouth two (2) times a day. 180 Cap 0    gabapentin (NEURONTIN) 300 mg capsule Take 300 mg by mouth three (3) times daily.  amoxicillin 500 mg tab Take  by mouth.  cinnamon bark (CINNAMON) 500 mg cap Take  by mouth.  DULoxetine (CYMBALTA) 30 mg capsule Take 30 mg by mouth daily.  tamsulosin (FLOMAX) 0.4 mg capsule Take 0.4 mg by mouth daily.  traZODone (DESYREL) 150 mg tablet Take 150 mg by mouth nightly.  multivit with min-folic acid (ONE-A-DAY MEN VITACRAVES) 200 mcg chew Take  by mouth.  telmisartan-hydroCHLOROthiazide (MICARDIS HCT) 80-12.5 mg per tablet Take 1 Tab by mouth daily. 90 Tab 1    finasteride (PROSCAR) 5 mg tablet Take 1 Tab by mouth daily. 90 Tab 1    meclizine (ANTIVERT) 25 mg tablet Take 1 Tab by mouth daily. 90 Tab 1    Omeprazole delayed release (PRILOSEC D/R) 20 mg tablet Take 1 Tab by mouth daily. 90 Tab 1    sildenafil citrate (VIAGRA) 100 mg tablet Take 1 Tab by mouth as needed.  Indications: Erectile Dysfunction 24 Tab 1     No Known Allergies  Past Medical History:   Diagnosis Date    Arthritis     Headache     Hypercholesterolemia     Hypertension     PTSD (post-traumatic stress disorder)     TBI (traumatic brain injury) ([facility] [address]      Past Surgical History:   Procedure Laterality Date    HX ORTHOPAEDIC Right     knee injections    HX OTHER SURGICAL  02/02/2017    tendon surgery      Family History   Problem Relation Age of Onset    No Known Problems Mother     No Known Problems Father      Social History   Substance Use Topics    Smoking status: Never Smoker    Smokeless tobacco: Never Used    Alcohol use No

## 2018-04-19 NOTE — H&P
HISTORY AND PHYSICAL          Patient: Donnie Head                MRN: [id number]       SSN: [id number]  YOB: 1955          AGE: 58 y.o. SEX: male      Patient scheduled for:  Right knee arthroscopy diagnostic and therapeutic    Surgeon: Guera Linares MD    ANESTHESIA TYPE:  General    HISTORY:     The patient was seen in the office today for a preoperative history and physical for an upcoming above listed surgery. The patient is a pleasant 58 y.o. male who has a history of right knee pain. He has a long history of left ankle problems. 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 He subsequently had the left ankle replaced a few years ago in [place]. He still notes some pain in his left ankle. Over the years, he has limped on the left leg and put a lot of excessive pressure on the right lower extremity. He is now here with complaints of pain in the right knee. He points to pain along the medial aspect of the right knee. He does not describe mechanical locking symptoms of the right knee. He does use a cane in his right hand for ambulation assistance, but this is more because of the left ankle pain. He has not worn a brace on the right knee. He has not complained of instability of the right knee. He does take Celebrex medication which helped some of his arthritic symptoms, but not necessarily his ankle or his knee. It does help his hand arthritis.     He denies radiating leg pain. He denies pain in the right hip. He has not worn a brace on the right knee. He has had a recent MRI scan of the right knee, but no recent x-rays of the right knee. His pain is at a level of 5/10 on a daily basis and is aggravated by weightbearing activities. His pain is beginning to interfere with daily activities. Due to the current findings, affected activity of daily living and continued pain and discomfort, surgical intervention is indicated.  The alternatives, risks, and complications, including but not limited to infection, blood loss, need for blood transfusion, neurovascular damage, marty-incisional numbness, subcutaneous hematoma, bone fracture, anesthetic complications, DVT, PE, death, RSD, postoperative stiffness and pain, possible surgical scar, delayed healing and nonhealing, reflexive sympathetic dystrophy, damage to blood vessels and nerves, need for more surgery, MI, and stroke,  failure of hardware, gait disturbances,have been discussed. The patient understands and wishes to proceed with surgery. PAST MEDICAL HISTORY:     Past Medical History:   Diagnosis Date    Arthritis     Headache     Hypercholesterolemia     Hypertension     PTSD (post-traumatic stress disorder)     TBI (traumatic brain injury) [facility] [address]        CURRENT MEDICATIONS:     Current Outpatient Prescriptions   Medication Sig Dispense Refill    atorvastatin (LIPITOR) 40 mg tablet Take  by mouth daily.  prazosin (MINIPRESS) 5 mg capsule Take  by mouth nightly.  diclofenac (VOLTAREN) 1 % gel Apply  to affected area four (4) times daily. Apply to both knees qid as directed 5 Each 3    raNITIdine (ZANTAC) 150 mg tablet Take 1 Tab by mouth two (2) times a day. 180 Tab 3    BOTOX 200 unit injection       doxycycline (MONODOX) 100 mg capsule Take 100 mg by mouth daily.  amLODIPine (NORVASC) 10 mg tablet Take 1 Tab by mouth daily. 90 Tab 3    celecoxib (CELEBREX) 200 mg capsule Take 1 Cap by mouth two (2) times a day. 180 Cap 0    gabapentin (NEURONTIN) 300 mg capsule Take 300 mg by mouth three (3) times daily.  amoxicillin 500 mg tab Take  by mouth.  cinnamon bark (CINNAMON) 500 mg cap Take  by mouth.  DULoxetine (CYMBALTA) 30 mg capsule Take 30 mg by mouth daily.  tamsulosin (FLOMAX) 0.4 mg capsule Take 0.4 mg by mouth daily.  traZODone (DESYREL) 150 mg tablet Take 150 mg by mouth nightly.       multivit with min-folic acid (ONE-A-DAY MEN VITACRAVES) 200 mcg chew Take  by mouth.  telmisartan-hydroCHLOROthiazide (MICARDIS HCT) 80-12.5 mg per tablet Take 1 Tab by mouth daily. 90 Tab 1    finasteride (PROSCAR) 5 mg tablet Take 1 Tab by mouth daily. 90 Tab 1    meclizine (ANTIVERT) 25 mg tablet Take 1 Tab by mouth daily. 90 Tab 1    Omeprazole delayed release (PRILOSEC D/R) 20 mg tablet Take 1 Tab by mouth daily. 90 Tab 1    sildenafil citrate (VIAGRA) 100 mg tablet Take 1 Tab by mouth as needed. Indications: Erectile Dysfunction 24 Tab 1       ALLERGIES:     No Known Allergies      SURGICAL HISTORY:     Past Surgical History:   Procedure Laterality Date    HX ORTHOPAEDIC Right     knee injections    HX OTHER SURGICAL  02/02/2017    tendon surgery        SOCIAL HISTORY:     Social History     Social History    Marital status:      Spouse name: N/A    Number of children: N/A    Years of education: N/A     Social History Main Topics    Smoking status: Never Smoker    Smokeless tobacco: Never Used    Alcohol use No    Drug use: None    Sexual activity: Not Asked     Other Topics Concern    None     Social History Narrative       FAMILY HISTORY:     Family History   Problem Relation Age of Onset    No Known Problems Mother     No Known Problems Father        REVIEW OF SYSTEMS:     Negative for fevers, chills, chest pain, shortness of breath, weight loss, recent illness     General: Negative for fever and chills. No unexpected change in weight. Denies fatigue. No change in appetite. Skin: Negative for rash or itching. HEENT: Negative for congestion, sore throat, neck pain and neck stiffness. No change in vision or hearing. Hasn't noted any enlarged lymph nodes in the neck. Cardiovascular:  Negative for chest pain and palpitations. Has not noted pedal edema. Respiratory: Negative for cough, colds, sinus, hemoptysis, shortness of breath and wheezing.   Gastrointestinal: Negative for nausea and vomiting, rectal bleeding, coffee ground emesis, abdominal pain, diarrhea and constipation. Genitourinary: Negative for dysuria, frequency urgency, or burning on micturition. No flank pain, no foul smelling urine, no difficulty with initiating urination. Hematological: Negative for bleeding or easy bruising. Musculoskeletal: Negative  for arthralgias, back pain or neck pain. Neurological: Negative for dizziness, seizures or syncopal episodes. Denies headaches. Endocrine: Denies excessive thirst.  No heat/cold intolerance. Psychiatric: Negative for depression or insomnia. PHYSICAL EXAMINATION:     VITALS:   Visit Vitals    /83    Pulse 78    Temp 97.4 °F (36.3 °C)    Ht 5' 9\" (1.753 m)    Wt 288 lb (130.6 kg)    SpO2 97%    BMI 42.53 kg/m2     GEN:  Well developed, well nourished 58 y.o. male in no acute distress. HEENT: Normocephalic and atraumatic. Eyes: Conjunctivae and EOM are normal.Pupils are equal, round, and reactive to light. External ear normal appearance, external nose normal appearing. Mouth/Throat: Oropharynx is clear and moist, able to handle oral secretions w/out difficulty, airway patent  NECK: Supple. Normal ROM, No lymphadenopathy. Trachea is midline. No bruising, swelling or deformity  RESP: Clear to auscultation bilaterally. No wheezes, rales, rhonchi. Normal effort and breath sounds. No respiratory distress  CARDIO: Normal rate, regular rhythm and normal heart sounds. No MGR. ABDOMEN: Soft, non-tender, non-distended, normoactive bowel sounds in all four quadrants. There is no tenderness. There is no rebound and no guarding. BACK: No CVA or spinal tenderness  BREAST:  Deferred  PELVIC:    Deferred   RECTAL:  Deferred   :           Deferred  EXTREMITIES: EXAMINATION OF: right knee  With reference to the right knee, he does have a mild effusion of the right knee. He has a slight varus deformity of the right knee which is correctable to neutral position passively.   He also has a little laxity of the medial collateral ligament with valgus stress testing. This is almost symmetrical to the left knee however.     He does not have tenderness along the medial collateral ligament. He has no palpable lateral joint line tenderness. Nicolass test results in pain along the medial joint line, but no palpable click. He has satisfactory lateral collateral ligament stability. Cruciate ligament stability is intact. Anterior, as well as posterior drawer tests are negative. Anterior pivot shift test is negative. He has a good range of motion of the right knee from full extension to flexion of about 120º but this is accompanied by crepitus in the patellofemoral area. NEUROVASCULAR: Sensation intact to light touch and strength grossly intact and symmetrical. No nystagmus. Positive distal pulses and capillary refill. DVT ASSESSMENT:  There is not  calf tenderness. No evidence of DVT seen on physical exam.  MOTOR: In tact  PSYCH: Alert an oriented to person, place and time. Mood, memory, affect, behavior and judgment normal       RADIOGRAPHS & DIAGNOSTIC STUDIES:     MRI/xray reveals :     IMPRESSION right knee MRI     1. Partial extrusion of the medial meniscus with suspected radial tear at its  posterior corner. 2. Overlying grade 1 MCL sprain, likely chronic/degenerative. 3. Plaque like fluid collection in the region of the posterior medial corner of  the knee. Given its location, favors adventitial bursitis. Unlikely to reflect a  dissecting meniscal cyst.   4. Small radial tear within the lateral meniscus. 5. Moderate arthritis as discussed above.         LABS:       Labs pending    ASSESSMENT:       Encounter Diagnoses   Name Primary?  Arthritis of right knee Yes    Complex tear of medial meniscus of right knee, unspecified whether old or current tear, initial encounter     Pre-op evaluation        PLAN:     Again, the alternatives, risks, and complications, as well as expected outcome were discussed.  The patient understands and agrees to proceed with Right knee arthroscopy diagnostic and therapeutic .  Patient given orders listed below:    Orders Placed This Encounter    CBC WITH AUTOMATED DIFF    METABOLIC PANEL, COMPREHENSIVE    PROTHROMBIN TIME + INR    EKG, 12 LEAD, INITIAL         Barry Sousa PA-C  4/19/2018  4:27 PM

## 2018-04-20 VITALS
OXYGEN SATURATION: 98 % | BODY MASS INDEX: 42.51 KG/M2 | WEIGHT: 287 LBS | DIASTOLIC BLOOD PRESSURE: 69 MMHG | SYSTOLIC BLOOD PRESSURE: 104 MMHG | TEMPERATURE: 98.3 F | HEIGHT: 69 IN | RESPIRATION RATE: 16 BRPM | HEART RATE: 90 BPM

## 2018-04-20 LAB
ATRIAL RATE: 72 BPM
CALCULATED P AXIS, ECG09: 37 DEGREES
CALCULATED R AXIS, ECG10: -13 DEGREES
CALCULATED T AXIS, ECG11: 26 DEGREES
DIAGNOSIS, 93000: NORMAL
P-R INTERVAL, ECG05: 162 MS
Q-T INTERVAL, ECG07: 402 MS
QRS DURATION, ECG06: 74 MS
QTC CALCULATION (BEZET), ECG08: 440 MS
VENTRICULAR RATE, ECG03: 72 BPM

## 2018-04-24 ENCOUNTER — ANESTHESIA EVENT (OUTPATIENT)
Dept: SURGERY | Age: 63
End: 2018-04-24
Payer: MEDICARE

## 2018-04-25 ENCOUNTER — ANESTHESIA (OUTPATIENT)
Dept: SURGERY | Age: 63
End: 2018-04-25
Payer: MEDICARE

## 2018-04-25 ENCOUNTER — HOSPITAL ENCOUNTER (OUTPATIENT)
Age: 63
Setting detail: OUTPATIENT SURGERY
Discharge: HOME | End: 2018-04-25
Attending: ORTHOPAEDIC SURGERY | Admitting: ORTHOPAEDIC SURGERY
Payer: MEDICARE

## 2018-04-25 VITALS
RESPIRATION RATE: 18 BRPM | TEMPERATURE: 96 F | OXYGEN SATURATION: 96 % | HEIGHT: 69 IN | BODY MASS INDEX: 41.47 KG/M2 | SYSTOLIC BLOOD PRESSURE: 119 MMHG | HEART RATE: 75 BPM | DIASTOLIC BLOOD PRESSURE: 73 MMHG | WEIGHT: 280 LBS

## 2018-04-25 DIAGNOSIS — M19.90 ARTHRITIS: Primary | ICD-10-CM

## 2018-04-25 PROCEDURE — 77030002916 HC SUT ETHLN J&J -A: Performed by: ORTHOPAEDIC SURGERY

## 2018-04-25 PROCEDURE — 77030020782 HC GWN BAIR PAWS FLX 3M -B: Performed by: ORTHOPAEDIC SURGERY

## 2018-04-25 PROCEDURE — 77030027385 HC BLD SHV ARTHSCP STRY -B: Performed by: ORTHOPAEDIC SURGERY

## 2018-04-25 PROCEDURE — 77030018836 HC SOL IRR NACL ICUM -A: Performed by: ORTHOPAEDIC SURGERY

## 2018-04-25 PROCEDURE — 74011000250 HC RX REV CODE- 250

## 2018-04-25 PROCEDURE — 74011250636 HC RX REV CODE- 250/636

## 2018-04-25 PROCEDURE — 76060000032 HC ANESTHESIA 0.5 TO 1 HR: Performed by: ORTHOPAEDIC SURGERY

## 2018-04-25 PROCEDURE — 77030019605: Performed by: ORTHOPAEDIC SURGERY

## 2018-04-25 PROCEDURE — 74011250636 HC RX REV CODE- 250/636: Performed by: ORTHOPAEDIC SURGERY

## 2018-04-25 PROCEDURE — 76010000138 HC OR TIME 0.5 TO 1 HR: Performed by: ORTHOPAEDIC SURGERY

## 2018-04-25 PROCEDURE — 77030010509 HC AIRWY LMA MSK TELE -A: Performed by: ANESTHESIOLOGY

## 2018-04-25 PROCEDURE — 77030008496 HC TBNG ARTHSC IRR S&N -B: Performed by: ORTHOPAEDIC SURGERY

## 2018-04-25 PROCEDURE — 74011000250 HC RX REV CODE- 250: Performed by: NURSE ANESTHETIST, CERTIFIED REGISTERED

## 2018-04-25 PROCEDURE — 74011250637 HC RX REV CODE- 250/637: Performed by: ORTHOPAEDIC SURGERY

## 2018-04-25 PROCEDURE — 77030012893: Performed by: ORTHOPAEDIC SURGERY

## 2018-04-25 PROCEDURE — 76210000016 HC OR PH I REC 1 TO 1.5 HR: Performed by: ORTHOPAEDIC SURGERY

## 2018-04-25 PROCEDURE — 76210000021 HC REC RM PH II 0.5 TO 1 HR: Performed by: ORTHOPAEDIC SURGERY

## 2018-04-25 PROCEDURE — 74011250636 HC RX REV CODE- 250/636: Performed by: NURSE ANESTHETIST, CERTIFIED REGISTERED

## 2018-04-25 PROCEDURE — 74011000250 HC RX REV CODE- 250: Performed by: ORTHOPAEDIC SURGERY

## 2018-04-25 PROCEDURE — 77030008574 HC TBNG SUC IRR STRY -B: Performed by: ORTHOPAEDIC SURGERY

## 2018-04-25 RX ORDER — OXYCODONE AND ACETAMINOPHEN 5; 325 MG/1; MG/1
1 TABLET ORAL
Qty: 30 TAB | Refills: 0 | Status: SHIPPED | OUTPATIENT
Start: 2018-04-25 | End: 2018-05-03 | Stop reason: ALTCHOICE

## 2018-04-25 RX ORDER — NALOXONE HYDROCHLORIDE 0.4 MG/ML
0.1 INJECTION, SOLUTION INTRAMUSCULAR; INTRAVENOUS; SUBCUTANEOUS ONCE
Status: DISCONTINUED | OUTPATIENT
Start: 2018-04-25 | End: 2018-04-25 | Stop reason: HOSPADM

## 2018-04-25 RX ORDER — MIDAZOLAM HYDROCHLORIDE 1 MG/ML
INJECTION, SOLUTION INTRAMUSCULAR; INTRAVENOUS AS NEEDED
Status: DISCONTINUED | OUTPATIENT
Start: 2018-04-25 | End: 2018-04-25 | Stop reason: HOSPADM

## 2018-04-25 RX ORDER — MAGNESIUM SULFATE 100 %
4 CRYSTALS MISCELLANEOUS AS NEEDED
Status: DISCONTINUED | OUTPATIENT
Start: 2018-04-25 | End: 2018-04-25 | Stop reason: HOSPADM

## 2018-04-25 RX ORDER — ACETAMINOPHEN 325 MG/1
650 TABLET ORAL ONCE
Status: COMPLETED | OUTPATIENT
Start: 2018-04-25 | End: 2018-04-25

## 2018-04-25 RX ORDER — FENTANYL CITRATE 50 UG/ML
50 INJECTION, SOLUTION INTRAMUSCULAR; INTRAVENOUS AS NEEDED
Status: DISCONTINUED | OUTPATIENT
Start: 2018-04-25 | End: 2018-04-25 | Stop reason: HOSPADM

## 2018-04-25 RX ORDER — SODIUM CHLORIDE, SODIUM LACTATE, POTASSIUM CHLORIDE, CALCIUM CHLORIDE 600; 310; 30; 20 MG/100ML; MG/100ML; MG/100ML; MG/100ML
75 INJECTION, SOLUTION INTRAVENOUS CONTINUOUS
Status: DISCONTINUED | OUTPATIENT
Start: 2018-04-25 | End: 2018-04-25 | Stop reason: HOSPADM

## 2018-04-25 RX ORDER — FENTANYL CITRATE 50 UG/ML
INJECTION, SOLUTION INTRAMUSCULAR; INTRAVENOUS AS NEEDED
Status: DISCONTINUED | OUTPATIENT
Start: 2018-04-25 | End: 2018-04-25 | Stop reason: HOSPADM

## 2018-04-25 RX ORDER — OXYCODONE AND ACETAMINOPHEN 5; 325 MG/1; MG/1
1 TABLET ORAL
Status: DISCONTINUED | OUTPATIENT
Start: 2018-04-25 | End: 2018-04-25 | Stop reason: HOSPADM

## 2018-04-25 RX ORDER — LIDOCAINE HYDROCHLORIDE 20 MG/ML
INJECTION, SOLUTION EPIDURAL; INFILTRATION; INTRACAUDAL; PERINEURAL AS NEEDED
Status: DISCONTINUED | OUTPATIENT
Start: 2018-04-25 | End: 2018-04-25 | Stop reason: HOSPADM

## 2018-04-25 RX ORDER — BUPIVACAINE HYDROCHLORIDE 2.5 MG/ML
INJECTION, SOLUTION EPIDURAL; INFILTRATION; INTRACAUDAL AS NEEDED
Status: DISCONTINUED | OUTPATIENT
Start: 2018-04-25 | End: 2018-04-25 | Stop reason: HOSPADM

## 2018-04-25 RX ORDER — DEXAMETHASONE SODIUM PHOSPHATE 4 MG/ML
INJECTION, SOLUTION INTRA-ARTICULAR; INTRALESIONAL; INTRAMUSCULAR; INTRAVENOUS; SOFT TISSUE AS NEEDED
Status: DISCONTINUED | OUTPATIENT
Start: 2018-04-25 | End: 2018-04-25 | Stop reason: HOSPADM

## 2018-04-25 RX ORDER — EPINEPHRINE 1 MG/ML
INJECTION INTRAMUSCULAR; INTRAVENOUS; SUBCUTANEOUS AS NEEDED
Status: DISCONTINUED | OUTPATIENT
Start: 2018-04-25 | End: 2018-04-25 | Stop reason: HOSPADM

## 2018-04-25 RX ORDER — SODIUM CHLORIDE 0.9 % (FLUSH) 0.9 %
5-10 SYRINGE (ML) INJECTION EVERY 8 HOURS
Status: DISCONTINUED | OUTPATIENT
Start: 2018-04-25 | End: 2018-04-25 | Stop reason: HOSPADM

## 2018-04-25 RX ORDER — PROPOFOL 10 MG/ML
INJECTION, EMULSION INTRAVENOUS AS NEEDED
Status: DISCONTINUED | OUTPATIENT
Start: 2018-04-25 | End: 2018-04-25 | Stop reason: HOSPADM

## 2018-04-25 RX ORDER — DEXTROSE 50 % IN WATER (D50W) INTRAVENOUS SYRINGE
25-50 AS NEEDED
Status: DISCONTINUED | OUTPATIENT
Start: 2018-04-25 | End: 2018-04-25 | Stop reason: HOSPADM

## 2018-04-25 RX ORDER — ONDANSETRON 2 MG/ML
INJECTION INTRAMUSCULAR; INTRAVENOUS AS NEEDED
Status: DISCONTINUED | OUTPATIENT
Start: 2018-04-25 | End: 2018-04-25 | Stop reason: HOSPADM

## 2018-04-25 RX ORDER — SODIUM CHLORIDE 0.9 % (FLUSH) 0.9 %
5-10 SYRINGE (ML) INJECTION AS NEEDED
Status: DISCONTINUED | OUTPATIENT
Start: 2018-04-25 | End: 2018-04-25 | Stop reason: HOSPADM

## 2018-04-25 RX ADMIN — PROPOFOL 200 MG: 10 INJECTION, EMULSION INTRAVENOUS at 11:41

## 2018-04-25 RX ADMIN — SODIUM CHLORIDE, SODIUM LACTATE, POTASSIUM CHLORIDE, AND CALCIUM CHLORIDE 75 ML/HR: 600; 310; 30; 20 INJECTION, SOLUTION INTRAVENOUS at 09:17

## 2018-04-25 RX ADMIN — FENTANYL CITRATE 100 MCG: 50 INJECTION, SOLUTION INTRAMUSCULAR; INTRAVENOUS at 11:41

## 2018-04-25 RX ADMIN — ACETAMINOPHEN 650 MG: 325 TABLET ORAL at 09:10

## 2018-04-25 RX ADMIN — DEXAMETHASONE SODIUM PHOSPHATE 4 MG: 4 INJECTION, SOLUTION INTRA-ARTICULAR; INTRALESIONAL; INTRAMUSCULAR; INTRAVENOUS; SOFT TISSUE at 11:50

## 2018-04-25 RX ADMIN — LIDOCAINE HYDROCHLORIDE 40 MG: 20 INJECTION, SOLUTION EPIDURAL; INFILTRATION; INTRACAUDAL; PERINEURAL at 11:41

## 2018-04-25 RX ADMIN — FENTANYL CITRATE 50 MCG: 50 INJECTION INTRAMUSCULAR; INTRAVENOUS at 13:04

## 2018-04-25 RX ADMIN — ONDANSETRON 4 MG: 2 INJECTION INTRAMUSCULAR; INTRAVENOUS at 12:11

## 2018-04-25 RX ADMIN — CEFAZOLIN SODIUM IN SODIUM CHLORIDE 0.9% IV SOLN 3 GM/100ML 3 G: 3-0.9/1 SOLUTION at 11:36

## 2018-04-25 RX ADMIN — MIDAZOLAM HYDROCHLORIDE 2 MG: 1 INJECTION, SOLUTION INTRAMUSCULAR; INTRAVENOUS at 11:32

## 2018-04-25 RX ADMIN — FAMOTIDINE 20 MG: 10 INJECTION INTRAVENOUS at 09:10

## 2018-04-25 NOTE — ANESTHESIA POSTPROCEDURE EVALUATION
Post-Anesthesia Evaluation and Assessment    Patient: Abraham Euceda MRN: [id number]  SSN: [id number]    YOB: 1955  Age: 58 y.o. Sex: male       Cardiovascular Function/Vital Signs  Visit Vitals    /63    Pulse 66    Temp 36.3 °C (97.4 °F)    Resp 12    Ht 5' 9\" (1.753 m)    Wt 127 kg (280 lb)    SpO2 97%    BMI 41.35 kg/m2       Patient is status post general anesthesia for Procedure(s):  RIGHT KNEE ARTHROSCOPY. Nausea/Vomiting: None    Postoperative hydration reviewed and adequate. Pain:  Pain Scale 1: Numeric (0 - 10) (04/25/18 1327)  Pain Intensity 1: 3 (04/25/18 1327)   Managed    Neurological Status:   Neuro (WDL): Within Defined Limits (04/25/18 1232)   At baseline    Mental Status and Level of Consciousness: Arousable    Pulmonary Status:   O2 Device: Room air (04/25/18 1252)   Adequate oxygenation and airway patent    Complications related to anesthesia: None    Post-anesthesia assessment completed.  No concerns    Signed By: Billy Walker MD     April 25, 2018

## 2018-04-25 NOTE — H&P (VIEW-ONLY)
HISTORY AND PHYSICAL          Patient: Alex Cheung                MRN: [id number]       SSN: [id number]  YOB: 1955          AGE: 58 y.o. SEX: male      Patient scheduled for:  Right knee arthroscopy diagnostic and therapeutic    Surgeon: Vic Cheng MD    ANESTHESIA TYPE:  General    HISTORY:     The patient was seen in the office today for a preoperative history and physical for an upcoming above listed surgery. The patient is a pleasant 58 y.o. male who has a history of right knee pain. He has a long history of left ankle problems. 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 He subsequently had the left ankle replaced a few years ago in [address] [facility]. He still notes some pain in his left ankle. Over the years, he has limped on the left leg and put a lot of excessive pressure on the right lower extremity. He is now here with complaints of pain in the right knee. He points to pain along the medial aspect of the right knee. He does not describe mechanical locking symptoms of the right knee. He does use a cane in his right hand for ambulation assistance, but this is more because of the left ankle pain. He has not worn a brace on the right knee. He has not complained of instability of the right knee. He does take Celebrex medication which helped some of his arthritic symptoms, but not necessarily his ankle or his knee. It does help his hand arthritis.     He denies radiating leg pain. He denies pain in the right hip. He has not worn a brace on the right knee. He has had a recent MRI scan of the right knee, but no recent x-rays of the right knee. His pain is at a level of 5/10 on a daily basis and is aggravated by weightbearing activities. His pain is beginning to interfere with daily activities. Due to the current findings, affected activity of daily living and continued pain and discomfort, surgical intervention is indicated.  The alternatives, risks, and complications, including but not limited to infection, blood loss, need for blood transfusion, neurovascular damage, marty-incisional numbness, subcutaneous hematoma, bone fracture, anesthetic complications, DVT, PE, death, RSD, postoperative stiffness and pain, possible surgical scar, delayed healing and nonhealing, reflexive sympathetic dystrophy, damage to blood vessels and nerves, need for more surgery, MI, and stroke,  failure of hardware, gait disturbances,have been discussed. The patient understands and wishes to proceed with surgery. PAST MEDICAL HISTORY:     Past Medical History:   Diagnosis Date    Arthritis     Headache     Hypercholesterolemia     Hypertension     PTSD (post-traumatic stress disorder)     TBI (traumatic brain injury) ([facility] [address]        CURRENT MEDICATIONS:     Current Outpatient Prescriptions   Medication Sig Dispense Refill    atorvastatin (LIPITOR) 40 mg tablet Take  by mouth daily.  prazosin (MINIPRESS) 5 mg capsule Take  by mouth nightly.  diclofenac (VOLTAREN) 1 % gel Apply  to affected area four (4) times daily. Apply to both knees qid as directed 5 Each 3    raNITIdine (ZANTAC) 150 mg tablet Take 1 Tab by mouth two (2) times a day. 180 Tab 3    BOTOX 200 unit injection       doxycycline (MONODOX) 100 mg capsule Take 100 mg by mouth daily.  amLODIPine (NORVASC) 10 mg tablet Take 1 Tab by mouth daily. 90 Tab 3    celecoxib (CELEBREX) 200 mg capsule Take 1 Cap by mouth two (2) times a day. 180 Cap 0    gabapentin (NEURONTIN) 300 mg capsule Take 300 mg by mouth three (3) times daily.  amoxicillin 500 mg tab Take  by mouth.  cinnamon bark (CINNAMON) 500 mg cap Take  by mouth.  DULoxetine (CYMBALTA) 30 mg capsule Take 30 mg by mouth daily.  tamsulosin (FLOMAX) 0.4 mg capsule Take 0.4 mg by mouth daily.  traZODone (DESYREL) 150 mg tablet Take 150 mg by mouth nightly.       multivit with min-folic acid (ONE-A-DAY MEN VITACRAVES) 200 mcg chew Take  by mouth.  telmisartan-hydroCHLOROthiazide (MICARDIS HCT) 80-12.5 mg per tablet Take 1 Tab by mouth daily. 90 Tab 1    finasteride (PROSCAR) 5 mg tablet Take 1 Tab by mouth daily. 90 Tab 1    meclizine (ANTIVERT) 25 mg tablet Take 1 Tab by mouth daily. 90 Tab 1    Omeprazole delayed release (PRILOSEC D/R) 20 mg tablet Take 1 Tab by mouth daily. 90 Tab 1    sildenafil citrate (VIAGRA) 100 mg tablet Take 1 Tab by mouth as needed. Indications: Erectile Dysfunction 24 Tab 1       ALLERGIES:     No Known Allergies      SURGICAL HISTORY:     Past Surgical History:   Procedure Laterality Date    HX ORTHOPAEDIC Right     knee injections    HX OTHER SURGICAL  02/02/2017    tendon surgery        SOCIAL HISTORY:     Social History     Social History    Marital status:      Spouse name: N/A    Number of children: N/A    Years of education: N/A     Social History Main Topics    Smoking status: Never Smoker    Smokeless tobacco: Never Used    Alcohol use No    Drug use: None    Sexual activity: Not Asked     Other Topics Concern    None     Social History Narrative       FAMILY HISTORY:     Family History   Problem Relation Age of Onset    No Known Problems Mother     No Known Problems Father        REVIEW OF SYSTEMS:     Negative for fevers, chills, chest pain, shortness of breath, weight loss, recent illness     General: Negative for fever and chills. No unexpected change in weight. Denies fatigue. No change in appetite. Skin: Negative for rash or itching. HEENT: Negative for congestion, sore throat, neck pain and neck stiffness. No change in vision or hearing. Hasn't noted any enlarged lymph nodes in the neck. Cardiovascular:  Negative for chest pain and palpitations. Has not noted pedal edema. Respiratory: Negative for cough, colds, sinus, hemoptysis, shortness of breath and wheezing.   Gastrointestinal: Negative for nausea and vomiting, rectal bleeding, coffee ground emesis, abdominal pain, diarrhea and constipation. Genitourinary: Negative for dysuria, frequency urgency, or burning on micturition. No flank pain, no foul smelling urine, no difficulty with initiating urination. Hematological: Negative for bleeding or easy bruising. Musculoskeletal: Negative  for arthralgias, back pain or neck pain. Neurological: Negative for dizziness, seizures or syncopal episodes. Denies headaches. Endocrine: Denies excessive thirst.  No heat/cold intolerance. Psychiatric: Negative for depression or insomnia. PHYSICAL EXAMINATION:     VITALS:   Visit Vitals    /83    Pulse 78    Temp 97.4 °F (36.3 °C)    Ht 5' 9\" (1.753 m)    Wt 288 lb (130.6 kg)    SpO2 97%    BMI 42.53 kg/m2     GEN:  Well developed, well nourished 58 y.o. male in no acute distress. HEENT: Normocephalic and atraumatic. Eyes: Conjunctivae and EOM are normal.Pupils are equal, round, and reactive to light. External ear normal appearance, external nose normal appearing. Mouth/Throat: Oropharynx is clear and moist, able to handle oral secretions w/out difficulty, airway patent  NECK: Supple. Normal ROM, No lymphadenopathy. Trachea is midline. No bruising, swelling or deformity  RESP: Clear to auscultation bilaterally. No wheezes, rales, rhonchi. Normal effort and breath sounds. No respiratory distress  CARDIO: Normal rate, regular rhythm and normal heart sounds. No MGR. ABDOMEN: Soft, non-tender, non-distended, normoactive bowel sounds in all four quadrants. There is no tenderness. There is no rebound and no guarding. BACK: No CVA or spinal tenderness  BREAST:  Deferred  PELVIC:    Deferred   RECTAL:  Deferred   :           Deferred  EXTREMITIES: EXAMINATION OF: right knee  With reference to the right knee, he does have a mild effusion of the right knee. He has a slight varus deformity of the right knee which is correctable to neutral position passively.   He also has a little laxity of the medial collateral ligament with valgus stress testing. This is almost symmetrical to the left knee however.     He does not have tenderness along the medial collateral ligament. He has no palpable lateral joint line tenderness. Nicolass test results in pain along the medial joint line, but no palpable click. He has satisfactory lateral collateral ligament stability. Cruciate ligament stability is intact. Anterior, as well as posterior drawer tests are negative. Anterior pivot shift test is negative. He has a good range of motion of the right knee from full extension to flexion of about 120º but this is accompanied by crepitus in the patellofemoral area. NEUROVASCULAR: Sensation intact to light touch and strength grossly intact and symmetrical. No nystagmus. Positive distal pulses and capillary refill. DVT ASSESSMENT:  There is not  calf tenderness. No evidence of DVT seen on physical exam.  MOTOR: In tact  PSYCH: Alert an oriented to person, place and time. Mood, memory, affect, behavior and judgment normal       RADIOGRAPHS & DIAGNOSTIC STUDIES:     MRI/xray reveals :     IMPRESSION right knee MRI     1. Partial extrusion of the medial meniscus with suspected radial tear at its  posterior corner. 2. Overlying grade 1 MCL sprain, likely chronic/degenerative. 3. Plaque like fluid collection in the region of the posterior medial corner of  the knee. Given its location, favors adventitial bursitis. Unlikely to reflect a  dissecting meniscal cyst.   4. Small radial tear within the lateral meniscus. 5. Moderate arthritis as discussed above.         LABS:       Labs pending    ASSESSMENT:       Encounter Diagnoses   Name Primary?  Arthritis of right knee Yes    Complex tear of medial meniscus of right knee, unspecified whether old or current tear, initial encounter     Pre-op evaluation        PLAN:     Again, the alternatives, risks, and complications, as well as expected outcome were discussed.  The patient understands and agrees to proceed with Right knee arthroscopy diagnostic and therapeutic .  Patient given orders listed below:    Orders Placed This Encounter    CBC WITH AUTOMATED DIFF    METABOLIC PANEL, COMPREHENSIVE    PROTHROMBIN TIME + INR    EKG, 12 LEAD, INITIAL         Amarimarta Mayes PA-C  4/19/2018  4:27 PM

## 2018-04-25 NOTE — PERIOP NOTES
I have reviewed discharge instructions with the patient and daughter, Kristina Mccall. The patient and daughter verbalized understanding. Patient armband removed and shredded.

## 2018-04-25 NOTE — DISCHARGE INSTRUCTIONS
Knee Arthroscopy: What to Expect at Home  Your Recovery    Arthroscopy is a way to find problems and do surgery inside a joint without making a large cut (incision). Your doctor put a lighted tube with a tiny camera-called an arthroscope, or scope-and surgical tools through small incisions in your knee. You will feel tired for several days. Your knee will be swollen, and you may notice that your skin is a different color near the cuts (incisions). The swelling is normal and will start to go away in a few days. Keeping your leg higher than your heart will help with swelling and pain. You will probably need about 6 weeks to recover. If your doctor repaired damaged tissue, recovery will take longer. You may have to limit your activity until your knee strength and movement return to normal. You may also be in a physical rehabilitation (rehab) program.  You may be able to return to a desk job or your normal routine in a few days. But if you do physical labor, it may be as long as 2 months before you can return to work. This care sheet gives you a general idea about how long it will take for you to recover. But each person recovers at a different pace. Follow the steps below to get better as quickly as possible. How can you care for yourself at home? Activity  ? · Rest when you feel tired. Getting enough sleep will help you recover. Use pillows to raise your ankle and leg above the level of your heart. ? · Try to walk each day, after your doctor has said you can. Start by walking a little more than you did the day before. Bit by bit, increase the amount you walk. Walking boosts blood flow and helps prevent pneumonia and constipation. ? · You may have a brace or crutches or both. ? · Your doctor will tell you how often and how much you can move your leg and knee. ? · If you have a desk job, you may be able to return to work a few days after the surgery.  If you lift things or stand or walk a lot at work, it may be as long as 2 months before you can return. ? · You can take a shower 48 to 72 hours after surgery and clean the incisions with regular soap and water. Do not take a bath or soak your knee until your doctor says it is okay. ? · Ask your doctor when you can drive again. ? · If you had a repair of torn tissue, follow your doctor's instructions for lifting things or moving your knee. Diet  ? · You can eat your normal diet. If your stomach is upset, try bland, low-fat foods like plain rice, broiled chicken, toast, and yogurt. ? · Drink plenty of fluids, unless your doctor tells you not to. ? · You may notice that your bowel movements are not regular right after your surgery. This is common. Try to avoid constipation and straining with bowel movements. You may want to take a fiber supplement every day. If you have not had a bowel movement after a couple of days, ask your doctor about taking a mild laxative. Medicines  ? · Your doctor will tell you if and when you can restart your medicines. He or she will also give you instructions about taking any new medicines. ? · If you take blood thinners, such as warfarin (Coumadin), clopidogrel (Plavix), or aspirin, be sure to talk to your doctor. He or she will tell you if and when to start taking those medicines again. Make sure that you understand exactly what your doctor wants you to do. ? · Be safe with medicines. Take pain medicines exactly as directed. ¨ If the doctor gave you a prescription medicine for pain, take it as prescribed. ¨ If you are not taking a prescription pain medicine, ask your doctor if you can take an over-the-counter medicine. ? · If you think your pain medicine is making you sick to your stomach:  ¨ Take your medicine after meals (unless your doctor has told you not to). ¨ Ask your doctor for a different pain medicine. ? · If your doctor prescribed antibiotics, take them as directed.  Do not stop taking them just because you feel better. You need to take the full course of antibiotics. Incision care  ? · If you have a dressing over your cuts (incisions), keep it clean and dry. You may remove it 48 to 72 hours after the surgery. ? · If your incisions are open to the air, keep the area clean and dry. ? · If you have strips of tape on the incisions, leave the tape on for a week or until it falls off. Exercise  ? · Move your toes and ankle as much as your bandages will allow. ? · Bend and straighten your knee slowly several times during the day. ? · Depending on why you had the surgery, you may have to do ankle and leg exercises. Your doctor or physical therapist will give you exercises as part of a rehabilitation program.   ? · Stop any activity that causes sharp pain. Talk to your doctor or physical therapist about what sports or other exercise you can do. Ice and elevation  ? · To reduce swelling and pain, put ice or a cold pack on your knee for 10 to 20 minutes at a time. Do this every 1 to 2 hours. Put a thin cloth between the ice and your skin. Follow-up care is a key part of your treatment and safety. Be sure to make and go to all appointments, and call your doctor if you are having problems. It's also a good idea to know your test results and keep a list of the medicines you take. When should you call for help? Call 911 anytime you think you may need emergency care. For example, call if:  ? · You passed out (lost consciousness). ? · You have severe trouble breathing. ? · You have sudden chest pain and shortness of breath, or you cough up blood. ?Call your doctor now or seek immediate medical care if:  ? · Your foot or toes are numb or tingling. ? · Your foot is cool or pale, or it changes color. ? · You have signs of a blood clot, such as:  ¨ Pain in your calf, back of the knee, thigh, or groin. ¨ Redness and swelling in your leg or groin. ? · You are sick to your stomach or cannot keep fluids down. ? · You have pain that does not get better after you take pain medicine. ? · You have loose stitches, or your incision comes open. ? · Bright red blood has soaked through the bandage over your incision. ? · You have signs of infection, such as:  ¨ Increased pain, swelling, warmth, or redness. ¨ Red streaks leading from the incisions. ¨ Pus draining from the incisions. ¨ A fever. ? Watch closely for any changes in your health, and be sure to contact your doctor if:  ? · You do not have a bowel movement after taking a laxative. Where can you learn more? Go to http://neel-stone.info/. Enter A838 in the search box to learn more about \"Knee Arthroscopy: What to Expect at Home. \"  Current as of: March 21, 2017  Content Version: 11.4  © 5305-8090 [employer]. Care instructions adapted under license by [employer] (which disclaims liability or warranty for this information). If you have questions about a medical condition or this instruction, always ask your healthcare professional. [place] [address] any warranty or liability for your use of this information. DISCHARGE SUMMARY from Nurse    PATIENT INSTRUCTIONS:    After general anesthesia or intravenous sedation, for 24 hours or while taking prescription Narcotics:  · Limit your activities  · Do not drive and operate hazardous machinery  · Do not make important personal or business decisions  · Do  not drink alcoholic beverages  · If you have not urinated within 8 hours after discharge, please contact your surgeon on call.     Report the following to your surgeon:  · Excessive pain, swelling, redness or odor of or around the surgical area  · Temperature over 100.5  · Nausea and vomiting lasting longer than 4 hours or if unable to take medications  · Any signs of decreased circulation or nerve impairment to extremity: change in color, persistent  numbness, tingling, coldness or increase pain  · Any questions  *  Please give a list of your current medications to your Primary Care Provider. *  Please update this list whenever your medications are discontinued, doses are      changed, or new medications (including over-the-counter products) are added. *  Please carry medication information at all times in case of emergency situations. These are general instructions for a healthy lifestyle:    No smoking/ No tobacco products/ Avoid exposure to second hand smoke  Surgeon General's Warning:  Quitting smoking now greatly reduces serious risk to your health. Obesity, smoking, and sedentary lifestyle greatly increases your risk for illness    A healthy diet, regular physical exercise & weight monitoring are important for maintaining a healthy lifestyle    You may be retaining fluid if you have a history of heart failure or if you experience any of the following symptoms:  Weight gain of 3 pounds or more overnight or 5 pounds in a week, increased swelling in our hands or feet or shortness of breath while lying flat in bed. Please call your doctor as soon as you notice any of these symptoms; do not wait until your next office visit. Recognize signs and symptoms of STROKE:    F-face looks uneven    A-arms unable to move or move unevenly    S-speech slurred or non-existent    T-time-call 911 as soon as signs and symptoms begin-DO NOT go       Back to bed or wait to see if you get better-TIME IS BRAIN. Warning Signs of HEART ATTACK     Call 911 if you have these symptoms:   Chest discomfort. Most heart attacks involve discomfort in the center of the chest that lasts more than a few minutes, or that goes away and comes back. It can feel like uncomfortable pressure, squeezing, fullness, or pain.  Discomfort in other areas of the upper body. Symptoms can include pain or discomfort in one or both arms, the back, neck, jaw, or stomach.    Shortness of breath with or without chest discomfort.  Other signs may include breaking out in a cold sweat, nausea, or lightheadedness. Don't wait more than five minutes to call 911 - MINUTES MATTER! Fast action can save your life. Calling 911 is almost always the fastest way to get lifesaving treatment. Emergency Medical Services staff can begin treatment when they arrive -- up to an hour sooner than if someone gets to the hospital by car. The discharge information has been reviewed with the patient and daughter. The patient and daughter verbalized understanding. Discharge medications reviewed with the patient and daughter and appropriate educational materials and side effects teaching were provided. ______________________________________________________________________________________________________________________________   Oxycodone/Acetaminophen (Percocet, Roxicet) - (By mouth)   Why this medicine is used:   Treats pain. This medicine contains a narcotic pain reliever. Contact a nurse or doctor right away if you have:  · Extreme weakness, shallow breathing, slow heartbeat  · Sweating or cold, clammy skin  · Skin blisters, rash, or peeling     Common side effects:  · Constipation  · Nausea, vomiting  · Tiredness  © 2017 [employer] Information is for End User's use only and may not be sold, redistributed or otherwise used for commercial purposes.

## 2018-04-25 NOTE — INTERVAL H&P NOTE
H&P Update:  Norman Mueller was seen and examined. History and physical has been reviewed. The patient has been examined.  There have been no significant clinical changes since the completion of the originally dated History and Physical.    Signed By: Ginger Smith MD     April 25, 2018 11:07 AM

## 2018-04-25 NOTE — BRIEF OP NOTE
BRIEF OPERATIVE NOTE    Date of Procedure: 4/25/2018   Preoperative Diagnosis: S83.241 RIGHT KNEE MENISCUS  Postoperative Diagnosis: S83.241 RIGHT KNEE MENISCUS    Procedure(s):  RIGHT KNEE ARTHROSCOPY with partial medial menisectomy  Surgeon(s) and Role:     * Hilario Cade MD - Primary         Surgical Assistant:  Monico Mckeon    Surgical Staff:  Circ-1: Carmelo Patel RN  Circ-Relief: Juan Francisco Fierro RN  Scrub Tech-1: Remberto Clemons  Scrub Tech-Relief: Marianne Lovelace  Surg Asst-1: Sammy Sarmiento  Surg Asst-Relief: Gary Beatty  Event Time In   Incision Start 1200   Incision Close 1218     Anesthesia: General   Estimated Blood Loss:  minimal  Specimens: * No specimens in log *   Findings:  Medial meniscus tear  Complications: none  Implants: * No implants in log *

## 2018-04-25 NOTE — ANESTHESIA PREPROCEDURE EVALUATION
Anesthetic History   No history of anesthetic complications            Review of Systems / Medical History  Patient summary reviewed and pertinent labs reviewed    Pulmonary        Sleep apnea: CPAP           Neuro/Psych   Within defined limits           Cardiovascular    Hypertension              Exercise tolerance: >4 METS     GI/Hepatic/Renal  Within defined limits              Endo/Other        Morbid obesity and arthritis     Other Findings   Comments: Documentation of current medication  Current medications obtained, documented and obtained? YES      Risk Factors for Postoperative nausea/vomiting:       History of postoperative nausea/vomiting? NO       Female? NO       Motion sickness? NO       Intended opioid administration for postoperative analgesia? YES      Smoking Abstinence:  Current Smoker? NO  Elective Surgery? YES  Seen preoperatively by anesthesiologist or proxy prior to day of surgery? YES  Pt abstained from smoking 24 hours prior to anesthesia?  YES    Preventive care/screening for High Blood Pressure:  Aged 18 years and older: YES  Screened for high blood pressure: YES  Patients with high blood pressure referred to primary care provider   for BP management: YES                 Physical Exam    Airway  Mallampati: II  TM Distance: 4 - 6 cm  Neck ROM: normal range of motion   Mouth opening: Normal     Cardiovascular    Rhythm: regular  Rate: normal         Dental  No notable dental hx       Pulmonary  Breath sounds clear to auscultation               Abdominal  GI exam deferred       Other Findings            Anesthetic Plan    ASA: 3  Anesthesia type: general          Induction: Intravenous  Anesthetic plan and risks discussed with: Patient

## 2018-04-25 NOTE — IP AVS SNAPSHOT
[address] Patient: Dominique Melvin MRN: [id number] JDI:[id number] About your hospitalization You were admitted on:  April 25, 2018 You last received care in the:  [facility] PHASE 2 RECOVERY You were discharged on:  April 25, 2018 Why you were hospitalized Your primary diagnosis was:  Not on File Follow-up Information Follow up With Details Comments Contact Info [place]ana [place]   [address] [place] 
[phone] Hilario Cade MD Follow up in 1 week(s)  [facility] [address] [facility] [address] 
[phone] Your Scheduled Appointments Thursday May 03, 2018  9:00 AM EDT  
POST OP with Hilario Cade MD  
4 [facility], [address] [facility] [address] ([facility] [place] [address], [address] [facility] [address]  
[phone] Thursday May 03, 2018 10:45 AM EDT Office Visit with Radha Rees MD  
[facility] [address] [place] [address]  
[phone] Discharge Orders None A check dee indicates which time of day the medication should be taken. My Medications START taking these medications Instructions Each Dose to Equal  
 Morning Noon Evening Bedtime [facility] Your last dose was: Your next dose is: Ice pack right knee 3 times per day for 24 hours, then prn. Pt. May be weight -bearing as tolerated right leg. oxyCODONE-acetaminophen 5-325 mg per tablet Commonly known as:  PERCOCET Your last dose was: Your next dose is: Take 1 Tab by mouth every four (4) hours as needed for Pain. Max Daily Amount: 6 Tabs. 1 Tab CONTINUE taking these medications Instructions Each Dose to Equal  
 Morning Noon Evening Bedtime  
 amLODIPine 10 mg tablet Commonly known as:  Ann Rodríguez Your last dose was: Your next dose is: Take 1 Tab by mouth daily. 10 mg  
    
   
   
   
  
 amoxicillin 500 mg Tab Your last dose was: Your next dose is: Take  by mouth. atorvastatin 40 mg tablet Commonly known as:  LIPITOR Your last dose was: Your next dose is: Take  by mouth daily. BOTOX 200 unit injection Generic drug:  onabotulinumtoxinA Your last dose was: Your next dose is:    
   
   
      
   
   
   
  
 celecoxib 200 mg capsule Commonly known as:  CELEBREX Your last dose was: Your next dose is: Take 1 Cap by mouth two (2) times a day. 200 mg CINNAMON 500 mg Cap Generic drug:  cinnamon bark Your last dose was: Your next dose is: Take  by mouth. diclofenac 1 % Gel Commonly known as:  VOLTAREN Your last dose was: Your next dose is:    
   
   
 Apply  to affected area four (4) times daily. Apply to both knees qid as directed  
     
   
   
   
  
 doxycycline 100 mg capsule Commonly known as:  Lance Gallegos Your last dose was: Your next dose is: Take 100 mg by mouth daily. 100 mg DULoxetine 30 mg capsule Commonly known as:  CYMBALTA Your last dose was: Your next dose is: Take 30 mg by mouth daily. 30 mg  
    
   
   
   
  
 finasteride 5 mg tablet Commonly known as:  PROSCAR Your last dose was: Your next dose is: Take 1 Tab by mouth daily. 5 mg  
    
   
   
   
  
 gabapentin 300 mg capsule Commonly known as:  NEURONTIN Your last dose was: Your next dose is: Take 300 mg by mouth three (3) times daily. 300 mg  
    
   
   
   
  
 meclizine 25 mg tablet Commonly known as:  ANTIVERT Your last dose was: Your next dose is: Take 1 Tab by mouth daily. 25 mg Omeprazole delayed release 20 mg tablet Commonly known as:  PRILOSEC D/R Your last dose was: Your next dose is: Take 1 Tab by mouth daily. 20 mg  
    
   
   
   
  
 ONE-A-DAY MEN VITACRAVES 200 mcg Chew Generic drug:  multivit with min-folic acid Your last dose was: Your next dose is: Take  by mouth.  
     
   
   
   
  
 prazosin 5 mg capsule Commonly known as:  MINIPRESS Your last dose was: Your next dose is: Take  by mouth nightly. raNITIdine 150 mg tablet Commonly known as:  ZANTAC Your last dose was: Your next dose is: Take 1 Tab by mouth two (2) times a day. 150 mg  
    
   
   
   
  
 sildenafil citrate 100 mg tablet Commonly known as:  VIAGRA Your last dose was: Your next dose is: Take 1 Tab by mouth as needed. Indications: Erectile Dysfunction 100 mg  
    
   
   
   
  
 tamsulosin 0.4 mg capsule Commonly known as:  FLOMAX Your last dose was: Your next dose is: Take 0.4 mg by mouth daily. 0.4 mg  
    
   
   
   
  
 telmisartan-hydroCHLOROthiazide 80-12.5 mg per tablet Commonly known as:  MICARDIS HCT Your last dose was: Your next dose is: Take 1 Tab by mouth daily. 1 Tab  
    
   
   
   
  
 traZODone 150 mg tablet Commonly known as:  Laura Torres Your last dose was: Your next dose is: Take 150 mg by mouth nightly. 150 mg Where to Get Your Medications Information on where to get these meds will be given to you by the nurse or doctor. ! Ask your nurse or doctor about these medications Ice Bag Misc  
 oxyCODONE-acetaminophen 5-325 mg per tablet Opioid Education Prescription Opioids: What You Need to Know: 
 
Prescription opioids can be used to help relieve moderate-to-severe pain and are often prescribed following a surgery or injury, or for certain health conditions. These medications can be an important part of treatment but also come with serious risks. Opioids are strong pain medicines. Examples include hydrocodone, oxycodone, fentanyl, and morphine. Heroin is an example of an illegal opioid. It is important to work with your health care provider to make sure you are getting the safest, most effective care. WHAT ARE THE RISKS AND SIDE EFFECTS OF OPIOID USE? Prescription opioids carry serious risks of addiction and overdose, especially with prolonged use. An opioid overdose, often marked by slow breathing, can cause sudden death. The use of prescription opioids can have a number of side effects as well, even when taken as directed. · Tolerance-meaning you might need to take more of a medication for the same pain relief · Physical dependence-meaning you have symptoms of withdrawal when the medication is stopped. Withdrawal symptoms can include nausea, sweating, chills, diarrhea, stomach cramps, and muscle aches. Withdrawal can last up to several weeks, depending on which drug you took and how long you took it. · Increased sensitivity to pain · Constipation · Nausea, vomiting, and dry mouth · Sleepiness and dizziness · Confusion · Depression · Low levels of testosterone that can result in lower sex drive, energy, and strength · Itching and sweating RISKS ARE GREATER WITH:      
· History of drug misuse, substance use disorder, or overdose · Mental health conditions (such as depression or anxiety) · Sleep apnea · Older age (72 years or older) · Pregnancy Avoid alcohol while taking prescription opioids. Also, unless specifically advised by your health care provider, medications to avoid include: · Benzodiazepines (such as Xanax or Valium) · Muscle relaxants (such as Soma or Flexeril) · Hypnotics (such as Ambien or Lunesta) · Other prescription opioids KNOW YOUR OPTIONS Talk to your health care provider about ways to manage your pain that don't involve prescription opioids. Some of these options may actually work better and have fewer risks and side effects. Options may include: 
· Pain relievers such as acetaminophen, ibuprofen, and naproxen · Some medications that are also used for depression or seizures · Physical therapy and exercise · Counseling to help patients learn how to cope better with triggers of pain and stress. · Application of heat or cold compress · Massage therapy · Relaxation techniques Be Informed Make sure you know the name of your medication, how much and how often to take it, and its potential risks & side effects. IF YOU ARE PRESCRIBED OPIOIDS FOR PAIN: 
· Never take opioids in greater amounts or more often than prescribed. Remember the goal is not to be pain-free but to manage your pain at a tolerable level. · Follow up with your primary care provider to: · Work together to create a plan on how to manage your pain. · Talk about ways to help manage your pain that don't involve prescription opioids. · Talk about any and all concerns and side effects. · Help prevent misuse and abuse. · Never sell or share prescription opioids · Help prevent misuse and abuse. · Store prescription opioids in a secure place and out of reach of others (this may include visitors, children, friends, and family).  
· Safely dispose of unused/unwanted prescription opioids: Find your community drug take-back program or your pharmacy mail-back program, or flush them down the toilet, following guidance from the Food and Drug Administration (www.fda.gov/Drugs/ResourcesForYou). · Visit www.cdc.gov/drugoverdose to learn about the risks of opioid abuse and overdose. · If you believe you may be struggling with addiction, tell your health care provider and ask for guidance or call Radha Merrill at 0-869-271-KQGU. Discharge Instructions Knee Arthroscopy: What to Expect at [facility] Your Recovery Arthroscopy is a way to find problems and do surgery inside a joint without making a large cut (incision). Your doctor put a lighted tube with a tiny camera-called an arthroscope, or scope-and surgical tools through small incisions in your knee. You will feel tired for several days. Your knee will be swollen, and you may notice that your skin is a different color near the cuts (incisions). The swelling is normal and will start to go away in a few days. Keeping your leg higher than your heart will help with swelling and pain. You will probably need about 6 weeks to recover. If your doctor repaired damaged tissue, recovery will take longer. You may have to limit your activity until your knee strength and movement return to normal. You may also be in a physical rehabilitation (rehab) program. 
You may be able to return to a desk job or your normal routine in a few days. But if you do physical labor, it may be as long as 2 months before you can return to work. This care sheet gives you a general idea about how long it will take for you to recover. But each person recovers at a different pace. Follow the steps below to get better as quickly as possible. How can you care for yourself at home? Activity ? · Rest when you feel tired. Getting enough sleep will help you recover. Use pillows to raise your ankle and leg above the level of your heart. ? · Try to walk each day, after your doctor has said you can.  Start by walking a little more than you did the day before. Bit by bit, increase the amount you walk. Walking boosts blood flow and helps prevent pneumonia and constipation. ? · You may have a brace or crutches or both. ? · Your doctor will tell you how often and how much you can move your leg and knee. ? · If you have a desk job, you may be able to return to work a few days after the surgery. If you lift things or stand or walk a lot at work, it may be as long as 2 months before you can return. ? · You can take a shower 48 to 72 hours after surgery and clean the incisions with regular soap and water. Do not take a bath or soak your knee until your doctor says it is okay. ? · Ask your doctor when you can drive again. ? · If you had a repair of torn tissue, follow your doctor's instructions for lifting things or moving your knee. Diet ? · You can eat your normal diet. If your stomach is upset, try bland, low-fat foods like plain rice, broiled chicken, toast, and yogurt. ? · Drink plenty of fluids, unless your doctor tells you not to. ? · You may notice that your bowel movements are not regular right after your surgery. This is common. Try to avoid constipation and straining with bowel movements. You may want to take a fiber supplement every day. If you have not had a bowel movement after a couple of days, ask your doctor about taking a mild laxative. Medicines ? · Your doctor will tell you if and when you can restart your medicines. He or she will also give you instructions about taking any new medicines. ? · If you take blood thinners, such as warfarin (Coumadin), clopidogrel (Plavix), or aspirin, be sure to talk to your doctor. He or she will tell you if and when to start taking those medicines again. Make sure that you understand exactly what your doctor wants you to do. ? · Be safe with medicines. Take pain medicines exactly as directed. ¨ If the doctor gave you a prescription medicine for pain, take it as prescribed. ¨ If you are not taking a prescription pain medicine, ask your doctor if you can take an over-the-counter medicine. ? · If you think your pain medicine is making you sick to your stomach: 
¨ Take your medicine after meals (unless your doctor has told you not to). ¨ Ask your doctor for a different pain medicine. ? · If your doctor prescribed antibiotics, take them as directed. Do not stop taking them just because you feel better. You need to take the full course of antibiotics. Incision care ? · If you have a dressing over your cuts (incisions), keep it clean and dry. You may remove it 48 to 72 hours after the surgery. ? · If your incisions are open to the air, keep the area clean and dry. ? · If you have strips of tape on the incisions, leave the tape on for a week or until it falls off. Exercise ? · Move your toes and ankle as much as your bandages will allow. ? · Bend and straighten your knee slowly several times during the day. ? · Depending on why you had the surgery, you may have to do ankle and leg exercises. Your doctor or physical therapist will give you exercises as part of a rehabilitation program.  
? · Stop any activity that causes sharp pain. Talk to your doctor or physical therapist about what sports or other exercise you can do. Ice and elevation ? · To reduce swelling and pain, put ice or a cold pack on your knee for 10 to 20 minutes at a time. Do this every 1 to 2 hours. Put a thin cloth between the ice and your skin. Follow-up care is a key part of your treatment and safety. Be sure to make and go to all appointments, and call your doctor if you are having problems. It's also a good idea to know your test results and keep a list of the medicines you take. When should you call for help? Call 911 anytime you think you may need emergency care. For example, call if: ? · You passed out (lost consciousness). ? · You have severe trouble breathing. ? · You have sudden chest pain and shortness of breath, or you cough up blood. ?Call your doctor now or seek immediate medical care if: 
? · Your foot or toes are numb or tingling. ? · Your foot is cool or pale, or it changes color. ? · You have signs of a blood clot, such as: 
¨ Pain in your calf, back of the knee, thigh, or groin. ¨ Redness and swelling in your leg or groin. ? · You are sick to your stomach or cannot keep fluids down. ? · You have pain that does not get better after you take pain medicine. ? · You have loose stitches, or your incision comes open. ? · Bright red blood has soaked through the bandage over your incision. ? · You have signs of infection, such as: 
¨ Increased pain, swelling, warmth, or redness. ¨ Red streaks leading from the incisions. ¨ Pus draining from the incisions. ¨ A fever. ? Watch closely for any changes in your health, and be sure to contact your doctor if: 
? · You do not have a bowel movement after taking a laxative. Where can you learn more? Go to http://neel-stone.info/. Enter V729 in the search box to learn more about \"Knee Arthroscopy: What to Expect at Home. \" Current as of: March 21, 2017 Content Version: 11.4 © 2906-5331 [employer]. Care instructions adapted under license by [employer] (which disclaims liability or warranty for this information). If you have questions about a medical condition or this instruction, always ask your healthcare professional. [place] [address] any warranty or liability for your use of this information. DISCHARGE SUMMARY from Nurse PATIENT INSTRUCTIONS: 
 
 
F-face looks uneven A-arms unable to move or move unevenly S-speech slurred or non-existent T-time-call 911 as soon as signs and symptoms begin-DO NOT go  
 Back to bed or wait to see if you get better-TIME IS BRAIN. Warning Signs of HEART ATTACK Call 911 if you have these symptoms: 
? Chest discomfort. Most heart attacks involve discomfort in the center of the chest that lasts more than a few minutes, or that goes away and comes back. It can feel like uncomfortable pressure, squeezing, fullness, or pain. ? Discomfort in other areas of the upper body. Symptoms can include pain or discomfort in one or both arms, the back, neck, jaw, or stomach. ? Shortness of breath with or without chest discomfort. ? Other signs may include breaking out in a cold sweat, nausea, or lightheadedness. Don't wait more than five minutes to call [address]! Fast action can save your life. Calling 911 is almost always the fastest way to get lifesaving treatment. Emergency Medical Services staff can begin treatment when they arrive  up to an hour sooner than if someone gets to the hospital by car. The discharge information has been reviewed with the patient and daughter. The patient and daughter verbalized understanding. Discharge medications reviewed with the patient and daughter and appropriate educational materials and side effects teaching were provided. ______________________________________________________________________________________________________________________________ Oxycodone/Acetaminophen (Percocet, Roxicet) - (By mouth) Why this medicine is used:  
Treats pain. This medicine contains a narcotic pain reliever. Contact a nurse or doctor right away if you have: 
· Extreme weakness, shallow breathing, slow heartbeat · Sweating or cold, clammy skin · Skin blisters, rash, or peeling Common side effects: 
· Constipation · Nausea, vomiting · Tiredness © 2017 26[address] Sanjay Lyles Information is for End User's use only and may not be sold, redistributed or otherwise used for commercial purposes. ACO Transitions of Care Introducing [facility] offers a voluntary care coordination program to provide high quality service and care to [facility] fee-for-service beneficiaries. Noemí [place] was designed to help you enhance your health and well-being through the following services: ? Transitions of Care  support for individuals who are transitioning from one care setting to another (example: Hospital to home). ? Chronic and Complex Care Coordination  support for individuals and caregivers of those with serious or chronic illnesses or with more than one chronic (ongoing) condition and those who take a number of different medications. If you meet specific medical criteria, a [address] [place] [address] Rd may call you directly to coordinate your care with your primary care physician and your other care providers. For questions about the [facility] programs, please, contact your physicians office. For general questions or additional information about Accountable Care Organizations: 
Please visit www.medicare.gov/acos. html or call 1-800-MEDICARE (3-127.366.2446) TTY users should call 6-635.881.3471. Introducing [facility]! Dear Erin Duran: Thank you for requesting a Pulsant account. Our records indicate that you already have an active Pulsant account. You can access your account anytime at [email or web address] Did you know that you can access your hospital and ER discharge instructions at any time in Pulsant? You can also review all of your test results from your hospital stay or ER visit. Additional Information If you have questions, please visit the Frequently Asked Questions section of the Pulsant website at [email or web address] Remember, Pulsant is NOT to be used for urgent needs. For medical emergencies, dial 911. Now available from your iPhone and Android! Introducing Enrico Richard As a [facility] patient, I wanted to make you aware of our electronic visit tool called Enrico Richard. 28msec allows you to connect within minutes with a medical provider 24 hours a day, seven days a week via a mobile device or tablet or logging into a secure website from your computer. You can access Enrico Richard from anywhere in the [place]. A virtual visit might be right for you when you have a simple condition and feel like you just dont want to get out of bed, or cant get away from work for an appointment, when your regular [facility] provider is not available (evenings, weekends or holidays), or when youre out of town and need minor care. Electronic visits cost only $49 and if the [facility] provider determines a prescription is needed to treat your condition, one can be electronically transmitted to a nearby pharmacy*. Please take a moment to enroll today if you have not already done so. The enrollment process is free and takes just a few minutes. To enroll, please download the [facility] kika to your tablet or phone, or visit [email or web address] to enroll on your computer. And, as an [address] [place] [address] patient with a wooju account, the results of your visits will be scanned into your electronic medical record and your primary care provider will be able to view the scanned results. We urge you to continue to see your regular [facility] provider for your ongoing medical care. And while your primary care provider may not be the one available when you seek a Enrico Richard virtual visit, the peace of mind you get from getting a real diagnosis real time can be priceless. For more information on Enrico Richard, view our Frequently Asked Questions (FAQs) at [email or web address]. Sincerely, 
 
Bo Woodard MD 
Chief Medical Officer Tabby Jo *:  certain medications cannot be prescribed via Enrico Richard Providers Seen During Your Hospitalization Provider Specialty Primary office phone Nessa Llanos MD Orthopedic Surgery [phone] Your Primary Care Physician (PCP) Primary Care Physician Office Phone Office Fax Gabby Elliott [phone] You are allergic to the following No active allergies Recent Documentation Height Weight BMI Smoking Status 1.753 m 127 kg 41.35 kg/m2 Never Smoker Emergency Contacts Name Discharge Info Relation Home Work Mobile Asha Valdez DISCHARGE CAREGIVER [3] Spouse [3] [phone] Patient Belongings The following personal items are in your possession at time of discharge: 
  Dental Appliances: None  Visual Aid: None   Hearing Aids/Status: Left  Home Medications: None   Jewelry: None  Clothing: Pants, Shirt, Undergarments, Footwear Please provide this summary of care documentation to your next provider. Signatures-by signing, you are acknowledging that this After Visit Summary has been reviewed with you and you have received a copy. Patient Signature:  ____________________________________________________________ Date:  ____________________________________________________________  
  
Rupal Turner Provider Signature:  ____________________________________________________________ Date:  ____________________________________________________________

## 2018-05-03 ENCOUNTER — OFFICE VISIT (OUTPATIENT)
Dept: ORTHOPEDIC SURGERY | Age: 63
End: 2018-05-03

## 2018-05-03 ENCOUNTER — OFFICE VISIT (OUTPATIENT)
Dept: PAIN MANAGEMENT | Age: 63
End: 2018-05-03

## 2018-05-03 VITALS
DIASTOLIC BLOOD PRESSURE: 81 MMHG | TEMPERATURE: 97.8 F | WEIGHT: 288 LBS | SYSTOLIC BLOOD PRESSURE: 126 MMHG | BODY MASS INDEX: 42.65 KG/M2 | HEIGHT: 69 IN | RESPIRATION RATE: 16 BRPM | HEART RATE: 78 BPM

## 2018-05-03 VITALS — BODY MASS INDEX: 41.47 KG/M2 | HEIGHT: 69 IN | WEIGHT: 280 LBS

## 2018-05-03 DIAGNOSIS — M48.061 SPINAL STENOSIS OF LUMBAR REGION WITHOUT NEUROGENIC CLAUDICATION: Primary | ICD-10-CM

## 2018-05-03 DIAGNOSIS — M47.816 LUMBAR FACET ARTHROPATHY: ICD-10-CM

## 2018-05-03 DIAGNOSIS — S83.231A COMPLEX TEAR OF MEDIAL MENISCUS OF RIGHT KNEE, UNSPECIFIED WHETHER OLD OR CURRENT TEAR, INITIAL ENCOUNTER: Primary | ICD-10-CM

## 2018-05-03 DIAGNOSIS — S83.241A ACUTE MEDIAL MENISCUS TEAR OF RIGHT KNEE, INITIAL ENCOUNTER: Primary | ICD-10-CM

## 2018-05-03 DIAGNOSIS — G89.4 CHRONIC PAIN SYNDROME: ICD-10-CM

## 2018-05-03 DIAGNOSIS — M47.816 LUMBAR SPONDYLOSIS: ICD-10-CM

## 2018-05-03 DIAGNOSIS — M51.37 DEGENERATIVE DISC DISEASE AT L5-S1 LEVEL: ICD-10-CM
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
## 2018-05-05 NOTE — PROGRESS NOTES
[address] for Pain Management  Interventional Pain Management Consultation History & Physical    PATIENT NAME:  Ariel Razo     YOB: 1955    DATE OF SERVICE:   5/3/2018      CHIEF COMPLAINT:  LOW BACK PAIN (down into hips bilateral)      REASON FOR VISIT:   Ariel Razo presents to the pain clinic today for follow on evaluation and to consider interventional pain management options as indicated for the type and location of the pain the patient is presenting with. HISTORY OF PRESENT ILLNESS:    Janell Goldmann presents for follow on evaluation and to discuss further procedures as may be indicated. We saw this gentleman October 26, 2017. This is at the request of Dr. Malachi Edgar. I was requested to consider lumbar radiofrequency neurotomy procedures. I found this gentleman to be a good candidate for lumbar radiofrequency procedures as indicated. I did note that he likely had a number of lumbar pain generators including lumbar spinal stenosis, lumbar facet arthropathy, lumbar spondylosis, lumbar disc degeneration. He underwent bilateral lumbar radiofrequency neurotomy procedures. Right side was done December 11, 2017.,  Left side was done January 3, 2018. Patient had approximately 6 weeks pain relief. By mid February his pain was already returning. He again endorses low back pain without significant radiating leg pain. He is however very limited with regard to mobility, he does not walk that far to begin with. We reviewed his lumbar MRI from September 14, 2017. This is significant for severe canal stenosis L4-5, L5-S1 levels. Facet arthropathy, disc protrusions. ASSESSMENT/OPTIONS: as follows. We discussed options. I may be able to repeat lumbar radiofrequency neurotomy procedures in 6 months, approximately July 2018 timeframe.   I do not believe lumbar epidural steroids will help this gentleman, with epidural lipomatosis and severe spinal stenosis. We did discuss possibility of patient seeing Dr. Sadaf York, orthopedic spine surgeon. Patient agrees with this. I will place this consult. MRI Results (most recent):    [facility]   MRI KNEE RT WO CONT   Narrative Procedure: MRI of the right knee without contrast    CPT code: 07781    Indications: Chronic pain, sensation of grinding and instability. Arthritis. Comparisons: None. Technique: The following sequences were performed through the right knee:    1. Three plane FSE T2 weighted images with fat saturation. 2.  Sagittal FSE proton density with fat saturation. 3.  Coronal SE T1 or FSE PD weighted images without fat saturation. Findings:    Mild to moderate regional subcutaneous tissue edema mainly anteriorly. There is  also edema around the popliteus muscle belly. There is small to moderate caliber joint effusion, mainly suprapatellar. No  intra-articular loose body. Osseous structures/cartilage: Minor marginal spurring mainly at the  weightbearing joint lines. Full-thickness cartilage loss on the medial patellar facet with mild subchondral  marrow edema. There is also some full-thickness cartilage loss on the opposing  medial femoral trochlea with mild marrow edema. There is grade 3 cartilage loss at the central weightbearing medial femoral  condyle. Likely foci of full-thickness fissuring with faint subchondral marrow  edema. Menisci:     There is partial extrusion of the medial meniscus from the tibial plateau. There  is some irregularity at the posterior corner of the medial meniscus with a  suspected radial tear as on sagittal images 20-21, and around axial image 15. Coronal images 8-9, although not as well seen given its orientation. There is  some adjacent meniscocapsular edema.  There is also some mild upper surface  irregularity within the posterior horn of the medial meniscus, and within the  meniscal substance. Sagittal images 17-21. There is a small caliber radial tear of the free margin of the lateral meniscal  body, sagittal image 6, coronal image 11. Ligaments: The ACL and PCL are intact. There is thickening and edema of the MCL. Deep of the more distal portion of MCL  and partially enveloping distal extension of semimembranosus, there is a  slightly septated plaque like ganglion cyst or adventitial bursa that extends 4  cm caudad. Subcentimeter maximal thickness. Slightly wraps around the posterior  corner of the tibia near the level the pes anserinus insertion. The uppermost  extent of this focus is closest to the medial meniscus, but not clearly  communicating. The LCL complex is intact. Muscles/tendons: Extensor mechanism intact. Impression IMPRESSION[de-identified]    1. Partial extrusion of the medial meniscus with suspected radial tear at its  posterior corner. 2. Overlying grade 1 MCL sprain, likely chronic/degenerative. 3. Plaque like fluid collection in the region of the posterior medial corner of  the knee. Given its location, favors adventitial bursitis. Unlikely to reflect a  dissecting meniscal cyst.   4. Small radial tear within the lateral meniscus. 5. Moderate arthritis as discussed above. Thank you for this referral.            PAST MEDICAL HISTORY:   The patient  has a past medical history of Arthritis; Headache; Hypercholesterolemia; Hypertension; PTSD (post-traumatic stress disorder); Sleep apnea; and TBI (traumatic brain injury) ([facility] [address]     PAST SURGICAL HISTORY:   The patient  has a past surgical history that includes hx orthopaedic (Right); hx hernia repair; hx other surgical (02/02/2017); hx other surgical; hx other surgical; and hx other surgical.    CURRENT MEDICATIONS:   The patient has a current medication list which includes the following prescription(s): ice bag, atorvastatin, prazosin, diclofenac, ranitidine, botox, doxycycline, amlodipine, celecoxib, gabapentin, cinnamon bark, duloxetine, tamsulosin, trazodone, multivit with min-folic acid, telmisartan-hydrochlorothiazide, finasteride, meclizine, omeprazole delayed release, sildenafil citrate, and amoxicillin. ALLERGIES:   No Known Allergies    FAMILY HISTORY:   The patient family history includes No Known Problems in his father and mother. SOCIAL HISTORY:   The patient  reports that he has never smoked. He has never used smokeless tobacco. The patient  reports that he does not drink alcohol. He      REVIEW OF SYSTEMS:    The patient denies fever, chills, weight loss (Constitutional), rash, itching (Skin), tinnitus, congestion (HENT), blurred vision, photophobia (Eyes), palpitations, orthopnea (Cardiovascular), hemoptysis, wheezing (Respiratory), nausea, vomiting, diarrhea (Gastrointestinal), dysuria, hematuria, urgency (Genitourinary), bowel or bladder incontinence, loss of consciousness (Neurologic), suicidal or homicidal ideation or hallucinations (Psychiatric). Denies swelling, axillary or groin masses (Lymphatic). PHYSICAL EXAM:  VS:   Visit Vitals    /81 (BP 1 Location: Right arm, BP Patient Position: Sitting)    Pulse 78    Temp 97.8 °F (36.6 °C) (Oral)    Resp 16    Ht 5' 9\" (1.753 m)    Wt 130.6 kg (288 lb)    BMI 42.53 kg/m2     General: Well-developed and well-nourished. Body habitus consistent with recorded height and weight and the calculated BMI. Apparent distress due to low back pain. Head: Normocephalic, atraumatic. Skin: Inspection of the skin reveals no rashes, lesions or infection. CV: Regular rate. No murmurs or rubs noted. No peripheral edema noted. Pulm: Respirations are even and unlabored. Extr: No clubbing, cyanosis, or edema noted. Musculoskeletal:  1. Cervical spine -decreased ROM. No paraspinous tenderness at any level. There is no scoliosis, asymmetry, or musculoskeletal defect. 2. Thoracic spine - Full ROM.   No paraspinous tenderness at any level. There is no scoliosis, asymmetry, or musculoskeletal defect. 3. Lumbar spine -decreased range of motion all axes . Paraspinous tenderness throughout the lumbar spine. SI joints are nontender bilaterally. There is no scoliosis, asymmetry, or musculoskeletal defect. 4. Right upper extremity - Full ROM. 5/5 muscle strength in all muscle groups. No pain or tenderness in shoulder, elbow, wrist, or hand. 5. Left upper extremity - Full ROM. 5/5 muscle strength in all muscle groups. No pain or tenderness in shoulder, elbow, wrist, or hand. 6. Right lower extremity - Full ROM. 5/5 muscle strength in all muscle groups. No pain, tenderness, or swelling in the hip, knee, ankle or foot. 7. Left lower extremity - Full ROM. 5/5 muscle strength in all muscle groups. No pain, tenderness, or swelling in the hip, knee, ankle or foot. Neurological:  1. Mental Status - Alert, awake and oriented. Speech is clear and appropriate. 2. Cranial Nerves - Extraocular muscles intact bilaterally. Cranial nerves II-XII grossly intact bilaterally. 3. Gait - antalgic   4. Reflexes - 2+ and symmetric throughout. 5. Sensation - Intact to light touch and pin prick. 6. Provocative Tests - Spurlings negative bilaterally. Straight leg raise negative bilaterally. Psychological:  1. Mood and affect - Appropriate. 2. Speech - Appropriate. 3. Though content - Appropriate. 4. Judgment - Appropriate. ASSESSMENT:      ICD-10-CM ICD-9-CM    1. Spinal stenosis of lumbar region without neurogenic claudication M48.061 724.02    2. Lumbar spondylosis M47.816 721.3    3. Lumbar facet arthropathy (HCC) M46.96 721.3    4. Degenerative disc disease at L5-S1 level M51.36 722.52    5. Chronic pain syndrome G89.4 338.4            PLAN:    1.    I have thoroughly discussed the risks and benefits, indications, contraindications, and side effects of any and procedures that were mentioned at today's patient visit.  I have used a skeleton model to explain all procedures, as well as to provide added emphasis regarding procedures and as well for patient education purposes. I have answered all questions in great detail, and I have obtained verbal confirmation for all procedures planned with the patient. 3.    I have reviewed in great detail today, when indicated, the patient's MRI and other imaging studies with the patient. I have explained to the patient, when indicated, their condition using both actual recent and relevant images insofar as I am able to obtain actual images. I have used a skeleton model for added emphasis as well as patient education. 4.    I have advised patient to have a primary care provider continue to care for their health maintenance and general medical conditions. 5,    I have placed appropriate referrals to specialty care providers as I have deemed necessary through today's clinical consultation with the patient. 5.    I have explained to the patient that if any significant side effects, issues, problems, concerns, or perceived complications may arise at around the time of the patient's procedures, they should either call the pain management clinic or go to the emergency room immediately for medical provider evaluation. 6.   I have encouraged all patients to call the pain management clinic with any questions or concerns that they may have pertaining to their procedures. DISPOSITION:   The patients condition and plan were discussed at length and all questions were answered. The patient agrees with the plan. A total of 25 minutes was spent with the patient of which over half of the time was spent counseling the patient. Prakash Duval MD 5/4/2018 11:35 PM    Note: Although these clinic notes were documented by the provider at the time of the exam, they have not been proofed and are subject to transcription variance.

## 2018-05-10 ENCOUNTER — OFFICE VISIT (OUTPATIENT)
Dept: ORTHOPEDIC SURGERY | Age: 63
End: 2018-05-10

## 2018-05-10 VITALS
SYSTOLIC BLOOD PRESSURE: 137 MMHG | DIASTOLIC BLOOD PRESSURE: 91 MMHG | HEART RATE: 74 BPM | TEMPERATURE: 97.8 F | RESPIRATION RATE: 16 BRPM

## 2018-05-10 DIAGNOSIS — G89.29 CHRONIC BILATERAL LOW BACK PAIN WITHOUT SCIATICA: ICD-10-CM

## 2018-05-10 DIAGNOSIS — M48.061 SPINAL STENOSIS OF LUMBAR REGION WITHOUT NEUROGENIC CLAUDICATION: Primary | ICD-10-CM

## 2018-05-10 DIAGNOSIS — E66.01 OBESITY, MORBID (HCC): ICD-10-CM

## 2018-05-10 DIAGNOSIS — M54.50 CHRONIC BILATERAL LOW BACK PAIN WITHOUT SCIATICA: ICD-10-CM

## 2018-05-10 NOTE — MR AVS SNAPSHOT
[facility] 
 
 
 [address] [facility] [address] 
[phone] Patient: Donnie Head MRN: [id number] YPD:5/34/[id number] Visit Information Date & Time Provider Department Dept. Phone Encounter #  
 5/10/2018  8:30 AM Richard Taylor MD [facility], [address] and [facility] [phone] [id number] Your Appointments 6/4/2018  8:15 AM  
Follow Up with Neva Jett MD  
[address] [place] [address], [address] and [facility] Appt Note: 4 wk f/u rt knee [address] [facility], [address] [facility]  
[phone] [address] [place] [address], [facility] [address] 9/18/2018  8:00 AM  
Follow Up with Chauncey Radford MD  
[facility]) Appt Note: 5 mnth fu  
 [address] [facility] [address]  
[phone]  
  
   
 [address] [facility] [address] Upcoming Health Maintenance Date Due Influenza Age 5 to Adult 8/1/2018 MEDICARE YEARLY EXAM 9/29/2018 COLONOSCOPY 10/7/2020 DTaP/Tdap/Td series (2 - Td) 6/29/2027 Allergies as of 5/10/2018  Review Complete On: 5/10/2018 By: Richard Taylor MD  
 No Known Allergies Current Immunizations  Never Reviewed No immunizations on file. Not reviewed this visit You Were Diagnosed With   
  
 Codes Comments Spinal stenosis of lumbar region without neurogenic claudication    -  Primary ICD-10-CM: M48.061 
ICD-9-CM: 724.02 Obesity, morbid ([facility] [address]     ICD-10-CM: E66.01 
ICD-9-CM: 278.01 Chronic bilateral low back pain without sciatica     ICD-10-CM: M54.5, G89.29 ICD-9-CM: 724.2, 338.29 Vitals BP Pulse Temp Resp Smoking Status (!) 137/91 (BP 1 Location: Left arm, BP Patient Position: Sitting) 74 97.8 °F (36.6 °C) (Oral) 16 Never Smoker Preferred Pharmacy Pharmacy Name Phone [facility] [phone] Your Updated Medication List  
  
   
This list is accurate as of 5/10/18  9:23 AM.  Always use your most recent med list. amLODIPine 10 mg tablet Commonly known as:  Sosa Inks Take 1 Tab by mouth daily. amoxicillin 500 mg Tab Take  by mouth. atorvastatin 40 mg tablet Commonly known as:  LIPITOR Take  by mouth daily. BOTOX 200 unit injection Generic drug:  onabotulinumtoxinA  
  
 celecoxib 200 mg capsule Commonly known as:  CELEBREX Take 1 Cap by mouth two (2) times a day. CINNAMON 500 mg Cap Generic drug:  cinnamon bark Take  by mouth. diclofenac 1 % Gel Commonly known as:  VOLTAREN Apply  to affected area four (4) times daily. Apply to both knees qid as directed  
  
 doxycycline 100 mg capsule Commonly known as:  Katherne Jus Take 100 mg by mouth daily. DULoxetine 30 mg capsule Commonly known as:  CYMBALTA Take 30 mg by mouth daily. finasteride 5 mg tablet Commonly known as:  PROSCAR Take 1 Tab by mouth daily. gabapentin 300 mg capsule Commonly known as:  NEURONTIN Take 300 mg by mouth three (3) times daily. Ice [facility] [employer] Ice pack right knee 3 times per day for 24 hours, then prn. Pt. May be weight -bearing as tolerated right leg.  
  
 meclizine 25 mg tablet Commonly known as:  ANTIVERT Take 1 Tab by mouth daily. Omeprazole delayed release 20 mg tablet Commonly known as:  PRILOSEC D/R Take 1 Tab by mouth daily. ONE-A-DAY MEN VITACRAVES 200 mcg Chew Generic drug:  multivit with min-folic acid Take  by mouth.  
  
 prazosin 5 mg capsule Commonly known as:  MINIPRESS Take  by mouth nightly. raNITIdine 150 mg tablet Commonly known as:  ZANTAC Take 1 Tab by mouth two (2) times a day. sildenafil citrate 100 mg tablet Commonly known as:  VIAGRA Take 1 Tab by mouth as needed. Indications: Erectile Dysfunction  
  
 tamsulosin 0.4 mg capsule Commonly known as:  FLOMAX Take 0.4 mg by mouth daily. telmisartan-hydroCHLOROthiazide 80-12.5 mg per tablet Commonly known as:  MICARDIS HCT Take 1 Tab by mouth daily. traZODone 150 mg tablet Commonly known as:  Christa  Take 150 mg by mouth nightly. We Performed the Following REFERRAL TO PHYSICAL THERAPY [XNP74 Custom] Comments: [facility] [place] Referral Information Referral ID Referred By Referred To  
  
 [id number] MARGOTH Not Available Visits Status Start Date End Date 1 New Request 5/10/18 5/10/19 If your referral has a status of pending review or denied, additional information will be sent to support the outcome of this decision. Introducing [facility] HE[facility]! Dear Fortino Guillen: Thank you for requesting a [employer] account. Our records indicate that you already have an active [employer] account. You can access your account anytime at [email or web address] Did you know that you can access your hospital and ER discharge instructions at any time in [employer]? You can also review all of your test results from your hospital stay or ER visit. Additional Information If you have questions, please visit the Frequently Asked Questions section of the [employer] website at [email or web address] Remember, [employer] is NOT to be used for urgent needs. For medical emergencies, dial 911. Now available from your iPhone and Android! Please provide this summary of care documentation to your next provider. Your primary care clinician is listed as [id number] [facility]. If you have any questions after today's visit, please call [phone].
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
## 2018-05-16 NOTE — TELEPHONE ENCOUNTER
Pt called for refill on micardis. It looks like it hasn't been prescribed since 2016. He was also supposed to follow up in April. Pt was scheduled for tomorrow at 230. He states he will be here. I let him know at that time Dr. Evon Ramirez can refill his medication. Pt expressed clear understanding and had no further questions.

## 2018-05-17 ENCOUNTER — OFFICE VISIT (OUTPATIENT)
Dept: FAMILY MEDICINE CLINIC | Age: 63
End: 2018-05-17

## 2018-05-17 VITALS
BODY MASS INDEX: 42.36 KG/M2 | WEIGHT: 286 LBS | RESPIRATION RATE: 14 BRPM | OXYGEN SATURATION: 99 % | SYSTOLIC BLOOD PRESSURE: 138 MMHG | DIASTOLIC BLOOD PRESSURE: 83 MMHG | HEART RATE: 78 BPM | HEIGHT: 69 IN

## 2018-05-17 DIAGNOSIS — I80.9 PHLEBITIS: ICD-10-CM

## 2018-05-17 DIAGNOSIS — I10 ESSENTIAL HYPERTENSION: Primary | ICD-10-CM

## 2018-05-17 NOTE — MR AVS SNAPSHOT
[address] [facility] [address] 
[phone] Patient: Alex Cheung MRN: [id number] PGO:[birth year] Visit Information Date & Time Provider Department Dept. Phone Encounter #  
 5/17/2018  2:30 PM Edy Watson MD [facility] [phone] [id number] Follow-up Instructions Return in about 1 month (around 6/17/2018). Your Appointments 6/4/2018  8:15 AM  
Follow Up with Mary Keenan MD  
[facility], [address] [facility] ([address]) Appt Note: 4 wk f[facility] rt [facility] [address] [facility]  
[phone] [address] [place] [address], [facility] [address] 9/18/2018  8:00 AM  
Follow Up with Bre Nunez MD  
[facility] [address]) Appt Note: 5 mnth fu  
 [address] [facility] [address]  
[phone]  
  
   
 [address] [facility] [address] Upcoming Health Maintenance Date Due Influenza Age 5 to Adult 8/1/2018 MEDICARE YEARLY EXAM 9/29/2018 COLONOSCOPY 10/7/2020 DTaP/Tdap/Td series (2 - Td) 6/29/2027 Allergies as of 5/17/2018  Review Complete On: 5/17/2018 By: Edy Watson MD  
 No Known Allergies Current Immunizations  Never Reviewed No immunizations on file. Not reviewed this visit You Were Diagnosed With   
  
 Codes Comments Essential hypertension    -  Primary ICD-10-CM: I10 
ICD-9-CM: 401.9 Phlebitis     ICD-10-CM: I80.9 ICD-9-CM: 451.9 Vitals BP Pulse Resp Height(growth percentile) Weight(growth percentile) SpO2  
 138/83 78 14 5' 9\" (1.753 m) 286 lb (129.7 kg) 99% BMI Smoking Status 42.23 kg/m2 Never Smoker Vitals History BMI and BSA Data  Body Mass Index Body Surface Area  
 42.23 kg/m 2 2.51 m 2  
  
  
 Preferred Pharmacy Pharmacy Name Phone [phone] [facility] [phone] Your Updated Medication List  
  
   
This list is accurate as of 5/17/18  2:50 PM.  Always use your most recent med list. amLODIPine 10 mg tablet Commonly known as:  Malone Raddle Take 1 Tab by mouth daily. amoxicillin 500 mg Tab Take  by mouth. atorvastatin 40 mg tablet Commonly known as:  LIPITOR Take  by mouth daily. BOTOX 200 unit injection Generic drug:  onabotulinumtoxinA  
  
 celecoxib 200 mg capsule Commonly known as:  CELEBREX Take 1 Cap by mouth two (2) times a day. CINNAMON 500 mg Cap Generic drug:  cinnamon bark Take  by mouth. diclofenac 1 % Gel Commonly known as:  VOLTAREN Apply  to affected area four (4) times daily. Apply to both knees qid as directed  
  
 doxycycline 100 mg capsule Commonly known as:  Heddy Anuja Take 100 mg by mouth daily. DULoxetine 30 mg capsule Commonly known as:  CYMBALTA Take 30 mg by mouth daily. finasteride 5 mg tablet Commonly known as:  PROSCAR Take 1 Tab by mouth daily. gabapentin 300 mg capsule Commonly known as:  NEURONTIN Take 300 mg by mouth three (3) times daily. [facility] Ice pack right knee 3 times per day for 24 hours, then prn. Pt. May be weight -bearing as tolerated right leg.  
  
 meclizine 25 mg tablet Commonly known as:  ANTIVERT Take 1 Tab by mouth daily. Omeprazole delayed release 20 mg tablet Commonly known as:  PRILOSEC D/R Take 1 Tab by mouth daily. ONE-A-DAY MEN VITACRAVES 200 mcg Chew Generic drug:  multivit with min-folic acid Take  by mouth.  
  
 prazosin 5 mg capsule Commonly known as:  MINIPRESS Take  by mouth nightly. raNITIdine 150 mg tablet Commonly known as:  ZANTAC Take 1 Tab by mouth two (2) times a day. sildenafil citrate 100 mg tablet Commonly known as:  VIAGRA Take 1 Tab by mouth as needed. Indications: Erectile Dysfunction  
  
 tamsulosin 0.4 mg capsule Commonly known as:  FLOMAX Take 0.4 mg by mouth daily. telmisartan-hydroCHLOROthiazide 80-12.5 mg per tablet Commonly known as:  MICARDIS HCT Take 1 Tab by mouth daily. traZODone 150 mg tablet Commonly known as:  Illene Dus Take 150 mg by mouth nightly. Follow-up Instructions Return in about 1 month (around 6/17/2018). Patient Instructions High Blood Pressure: Care Instructions Your Care Instructions If your blood pressure is usually above 140/90, you have high blood pressure, or hypertension. That means the top number is 140 or higher or the bottom number is 90 or higher, or both. Despite what a lot of people think, high blood pressure usually doesn't cause headaches or make you feel dizzy or lightheaded. It usually has no symptoms. But it does increase your risk for heart attack, stroke, and kidney or eye damage. The higher your blood pressure, the more your risk increases. Your doctor will give you a goal for your blood pressure. Your goal will be based on your health and your age. An example of a goal is to keep your blood pressure below 140/90. Lifestyle changes, such as eating healthy and being active, are always important to help lower blood pressure. You might also take medicine to reach your blood pressure goal. 
Follow-up care is a key part of your treatment and safety. Be sure to make and go to all appointments, and call your doctor if you are having problems. It's also a good idea to know your test results and keep a list of the medicines you take. How can you care for yourself at home? Medical treatment · If you stop taking your medicine, your blood pressure will go back up. You may take one or more types of medicine to lower your blood pressure. Be safe with medicines. Take your medicine exactly as prescribed.  Call your doctor if you think you are having a problem with your medicine. · Talk to your doctor before you start taking aspirin every day. Aspirin can help certain people lower their risk of a heart attack or stroke. But taking aspirin isn't right for everyone, because it can cause serious bleeding. · See your doctor regularly. You may need to see the doctor more often at first or until your blood pressure comes down. · If you are taking blood pressure medicine, talk to your doctor before you take decongestants or anti-inflammatory medicine, such as ibuprofen. Some of these medicines can raise blood pressure. · Learn how to check your blood pressure at home. Lifestyle changes · Stay at a healthy weight. This is especially important if you put on weight around the waist. Losing even 10 pounds can help you lower your blood pressure. · If your doctor recommends it, get more exercise. Walking is a good choice. Bit by bit, increase the amount you walk every day. Try for at least 30 minutes on most days of the week. You also may want to swim, bike, or do other activities. · Avoid or limit alcohol. Talk to your doctor about whether you can drink any alcohol. · Try to limit how much sodium you eat to less than 2,300 milligrams (mg) a day. Your doctor may ask you to try to eat less than 1,500 mg a day. · Eat plenty of fruits (such as bananas and oranges), vegetables, legumes, whole grains, and low-fat dairy products. · Lower the amount of saturated fat in your diet. Saturated fat is found in animal products such as milk, cheese, and meat. Limiting these foods may help you lose weight and also lower your risk for heart disease. · Do not smoke. Smoking increases your risk for heart attack and stroke. If you need help quitting, talk to your doctor about stop-smoking programs and medicines. These can increase your chances of quitting for good. When should you call for help? Call 911 anytime you think you may need emergency care. This may mean having symptoms that suggest that your blood pressure is causing a serious heart or blood vessel problem. Your blood pressure may be over 180/110. ? For example, call 911 if: 
? · You have symptoms of a heart attack. These may include: ¨ Chest pain or pressure, or a strange feeling in the chest. 
¨ Sweating. ¨ Shortness of breath. ¨ Nausea or vomiting. ¨ Pain, pressure, or a strange feeling in the back, neck, jaw, or upper belly or in one or both shoulders or arms. ¨ Lightheadedness or sudden weakness. ¨ A fast or irregular heartbeat. ? · You have symptoms of a stroke. These may include: 
¨ Sudden numbness, tingling, weakness, or loss of movement in your face, arm, or leg, especially on only one side of your body. ¨ Sudden vision changes. ¨ Sudden trouble speaking. ¨ Sudden confusion or trouble understanding simple statements. ¨ Sudden problems with walking or balance. ¨ A sudden, severe headache that is different from past headaches. ? · You have severe back or belly pain. ?Do not wait until your blood pressure comes down on its own. Get help right away. ?Call your doctor now or seek immediate care if: 
? · Your blood pressure is much higher than normal (such as 180/110 or higher), but you don't have symptoms. ? · You think high blood pressure is causing symptoms, such as: ¨ Severe headache. ¨ Blurry vision. ? Watch closely for changes in your health, and be sure to contact your doctor if: 
? · Your blood pressure measures 140/90 or higher at least 2 times. That means the top number is 140 or higher or the bottom number is 90 or higher, or both. ? · You think you may be having side effects from your blood pressure medicine. ? · Your blood pressure is usually normal, but it goes above normal at least 2 times. Where can you learn more? Go to http://neel-stone.info/. Enter P433 in the search box to learn more about \"High Blood Pressure: Care Instructions. \" Current as of: September 21, 2016 Content Version: 11.4 © 9613-5521 [employer]. Care instructions adapted under license by [employer] ([place] (which disclaims liability or warranty for this information). If you have questions about a medical condition or this instruction, always ask your healthcare professional. [address] any warranty or liability for your use of this information. Introducing [facility] SERVICES! Dear Hope Failing: Thank you for requesting a [facility] account. Our records indicate that you already have an active [facility] account. You can access your account anytime at [email or web address] Did you know that you can access your hospital and ER discharge instructions at any time in [facility]? You can also review all of your test results from your hospital stay or ER visit. Additional Information If you have questions, please visit the Frequently Asked Questions section of the [employer] website at [email or web address] Remember, [facility] is NOT to be used for urgent needs. For medical emergencies, dial 911. Now available from your iPhone and Android! Please provide this summary of care documentation to your next provider. Your primary care clinician is listed as [address]. If you have any questions after today's visit, please call [phone].

## 2018-05-17 NOTE — PROGRESS NOTES
Chief Complaint   Patient presents with    Hypertension    Arm Pain     had scope done in April. Has pain radiating in left arm where IV was.  Side Pain     c/o right side pain when laying down     1. Have you been to the ER, urgent care clinic since your last visit? Hospitalized since your last visit? No    2. Have you seen or consulted any other health care providers outside of the [facility] since your last visit? Include any pap smears or colon screening.  No

## 2018-05-17 NOTE — PATIENT INSTRUCTIONS
High Blood Pressure: Care Instructions  Your Care Instructions    If your blood pressure is usually above 140/90, you have high blood pressure, or hypertension. That means the top number is 140 or higher or the bottom number is 90 or higher, or both. Despite what a lot of people think, high blood pressure usually doesn't cause headaches or make you feel dizzy or lightheaded. It usually has no symptoms. But it does increase your risk for heart attack, stroke, and kidney or eye damage. The higher your blood pressure, the more your risk increases. Your doctor will give you a goal for your blood pressure. Your goal will be based on your health and your age. An example of a goal is to keep your blood pressure below 140/90. Lifestyle changes, such as eating healthy and being active, are always important to help lower blood pressure. You might also take medicine to reach your blood pressure goal.  Follow-up care is a key part of your treatment and safety. Be sure to make and go to all appointments, and call your doctor if you are having problems. It's also a good idea to know your test results and keep a list of the medicines you take. How can you care for yourself at home? Medical treatment  · If you stop taking your medicine, your blood pressure will go back up. You may take one or more types of medicine to lower your blood pressure. Be safe with medicines. Take your medicine exactly as prescribed. Call your doctor if you think you are having a problem with your medicine. · Talk to your doctor before you start taking aspirin every day. Aspirin can help certain people lower their risk of a heart attack or stroke. But taking aspirin isn't right for everyone, because it can cause serious bleeding. · See your doctor regularly. You may need to see the doctor more often at first or until your blood pressure comes down.   · If you are taking blood pressure medicine, talk to your doctor before you take decongestants or anti-inflammatory medicine, such as ibuprofen. Some of these medicines can raise blood pressure. · Learn how to check your blood pressure at home. Lifestyle changes  · Stay at a healthy weight. This is especially important if you put on weight around the waist. Losing even 10 pounds can help you lower your blood pressure. · If your doctor recommends it, get more exercise. Walking is a good choice. Bit by bit, increase the amount you walk every day. Try for at least 30 minutes on most days of the week. You also may want to swim, bike, or do other activities. · Avoid or limit alcohol. Talk to your doctor about whether you can drink any alcohol. · Try to limit how much sodium you eat to less than 2,300 milligrams (mg) a day. Your doctor may ask you to try to eat less than 1,500 mg a day. · Eat plenty of fruits (such as bananas and oranges), vegetables, legumes, whole grains, and low-fat dairy products. · Lower the amount of saturated fat in your diet. Saturated fat is found in animal products such as milk, cheese, and meat. Limiting these foods may help you lose weight and also lower your risk for heart disease. · Do not smoke. Smoking increases your risk for heart attack and stroke. If you need help quitting, talk to your doctor about stop-smoking programs and medicines. These can increase your chances of quitting for good. When should you call for help? Call 911 anytime you think you may need emergency care. This may mean having symptoms that suggest that your blood pressure is causing a serious heart or blood vessel problem. Your blood pressure may be over 180/110. ? For example, call 911 if:  ? · You have symptoms of a heart attack. These may include:  ¨ Chest pain or pressure, or a strange feeling in the chest.  ¨ Sweating. ¨ Shortness of breath. ¨ Nausea or vomiting.   ¨ Pain, pressure, or a strange feeling in the back, neck, jaw, or upper belly or in one or both shoulders or arms.  ¨ Lightheadedness or sudden weakness. ¨ A fast or irregular heartbeat. ? · You have symptoms of a stroke. These may include:  ¨ Sudden numbness, tingling, weakness, or loss of movement in your face, arm, or leg, especially on only one side of your body. ¨ Sudden vision changes. ¨ Sudden trouble speaking. ¨ Sudden confusion or trouble understanding simple statements. ¨ Sudden problems with walking or balance. ¨ A sudden, severe headache that is different from past headaches. ? · You have severe back or belly pain. ?Do not wait until your blood pressure comes down on its own. Get help right away. ?Call your doctor now or seek immediate care if:  ? · Your blood pressure is much higher than normal (such as 180/110 or higher), but you don't have symptoms. ? · You think high blood pressure is causing symptoms, such as:  ¨ Severe headache. ¨ Blurry vision. ? Watch closely for changes in your health, and be sure to contact your doctor if:  ? · Your blood pressure measures 140/90 or higher at least 2 times. That means the top number is 140 or higher or the bottom number is 90 or higher, or both. ? · You think you may be having side effects from your blood pressure medicine. ? · Your blood pressure is usually normal, but it goes above normal at least 2 times. Where can you learn more? Go to http://neel-stone.info/. Enter Y419 in the search box to learn more about \"High Blood Pressure: Care Instructions. \"  Current as of: September 21, 2016  Content Version: 11.4  © 8900-8951 [employer]. Care instructions adapted under license by [employer] (which disclaims liability or warranty for this information). If you have questions about a medical condition or this instruction, always ask your healthcare professional. [place] [address] any warranty or liability for your use of this information.
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
## 2018-06-04 ENCOUNTER — OFFICE VISIT (OUTPATIENT)
Dept: ORTHOPEDIC SURGERY | Age: 63
End: 2018-06-04

## 2018-06-04 VITALS
RESPIRATION RATE: 16 BRPM | OXYGEN SATURATION: 93 % | HEART RATE: 97 BPM | TEMPERATURE: 96.7 F | BODY MASS INDEX: 42.36 KG/M2 | SYSTOLIC BLOOD PRESSURE: 130 MMHG | DIASTOLIC BLOOD PRESSURE: 80 MMHG | WEIGHT: 286 LBS | HEIGHT: 69 IN

## 2018-06-04 DIAGNOSIS — S83.241D TEAR OF MEDIAL MENISCUS OF RIGHT KNEE, UNSPECIFIED TEAR TYPE, UNSPECIFIED WHETHER OLD OR CURRENT TEAR, SUBSEQUENT ENCOUNTER: Primary | ICD-10-CM
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
## 2018-06-11 ENCOUNTER — OFFICE VISIT (OUTPATIENT)
Dept: FAMILY MEDICINE CLINIC | Age: 63
End: 2018-06-11

## 2018-06-11 VITALS
HEART RATE: 91 BPM | DIASTOLIC BLOOD PRESSURE: 80 MMHG | OXYGEN SATURATION: 98 % | SYSTOLIC BLOOD PRESSURE: 138 MMHG | RESPIRATION RATE: 15 BRPM | BODY MASS INDEX: 42.36 KG/M2 | TEMPERATURE: 98.1 F | WEIGHT: 286 LBS | HEIGHT: 69 IN

## 2018-06-11 DIAGNOSIS — K21.9 GASTROESOPHAGEAL REFLUX DISEASE WITHOUT ESOPHAGITIS: ICD-10-CM

## 2018-06-11 DIAGNOSIS — I10 ESSENTIAL HYPERTENSION: Primary | ICD-10-CM

## 2018-06-11 NOTE — PATIENT INSTRUCTIONS
High Blood Pressure: Care Instructions  Your Care Instructions    If your blood pressure is usually above 140/90, you have high blood pressure, or hypertension. That means the top number is 140 or higher or the bottom number is 90 or higher, or both. Despite what a lot of people think, high blood pressure usually doesn't cause headaches or make you feel dizzy or lightheaded. It usually has no symptoms. But it does increase your risk for heart attack, stroke, and kidney or eye damage. The higher your blood pressure, the more your risk increases. Your doctor will give you a goal for your blood pressure. Your goal will be based on your health and your age. An example of a goal is to keep your blood pressure below 140/90. Lifestyle changes, such as eating healthy and being active, are always important to help lower blood pressure. You might also take medicine to reach your blood pressure goal.  Follow-up care is a key part of your treatment and safety. Be sure to make and go to all appointments, and call your doctor if you are having problems. It's also a good idea to know your test results and keep a list of the medicines you take. How can you care for yourself at home? Medical treatment  · If you stop taking your medicine, your blood pressure will go back up. You may take one or more types of medicine to lower your blood pressure. Be safe with medicines. Take your medicine exactly as prescribed. Call your doctor if you think you are having a problem with your medicine. · Talk to your doctor before you start taking aspirin every day. Aspirin can help certain people lower their risk of a heart attack or stroke. But taking aspirin isn't right for everyone, because it can cause serious bleeding. · See your doctor regularly. You may need to see the doctor more often at first or until your blood pressure comes down.   · If you are taking blood pressure medicine, talk to your doctor before you take decongestants or anti-inflammatory medicine, such as ibuprofen. Some of these medicines can raise blood pressure. · Learn how to check your blood pressure at home. Lifestyle changes  · Stay at a healthy weight. This is especially important if you put on weight around the waist. Losing even 10 pounds can help you lower your blood pressure. · If your doctor recommends it, get more exercise. Walking is a good choice. Bit by bit, increase the amount you walk every day. Try for at least 30 minutes on most days of the week. You also may want to swim, bike, or do other activities. · Avoid or limit alcohol. Talk to your doctor about whether you can drink any alcohol. · Try to limit how much sodium you eat to less than 2,300 milligrams (mg) a day. Your doctor may ask you to try to eat less than 1,500 mg a day. · Eat plenty of fruits (such as bananas and oranges), vegetables, legumes, whole grains, and low-fat dairy products. · Lower the amount of saturated fat in your diet. Saturated fat is found in animal products such as milk, cheese, and meat. Limiting these foods may help you lose weight and also lower your risk for heart disease. · Do not smoke. Smoking increases your risk for heart attack and stroke. If you need help quitting, talk to your doctor about stop-smoking programs and medicines. These can increase your chances of quitting for good. When should you call for help? Call 911 anytime you think you may need emergency care. This may mean having symptoms that suggest that your blood pressure is causing a serious heart or blood vessel problem. Your blood pressure may be over 180/110. ? For example, call 911 if:  ? · You have symptoms of a heart attack. These may include:  ¨ Chest pain or pressure, or a strange feeling in the chest.  ¨ Sweating. ¨ Shortness of breath. ¨ Nausea or vomiting.   ¨ Pain, pressure, or a strange feeling in the back, neck, jaw, or upper belly or in one or both shoulders or arms.  ¨ Lightheadedness or sudden weakness. ¨ A fast or irregular heartbeat. ? · You have symptoms of a stroke. These may include:  ¨ Sudden numbness, tingling, weakness, or loss of movement in your face, arm, or leg, especially on only one side of your body. ¨ Sudden vision changes. ¨ Sudden trouble speaking. ¨ Sudden confusion or trouble understanding simple statements. ¨ Sudden problems with walking or balance. ¨ A sudden, severe headache that is different from past headaches. ? · You have severe back or belly pain. ?Do not wait until your blood pressure comes down on its own. Get help right away. ?Call your doctor now or seek immediate care if:  ? · Your blood pressure is much higher than normal (such as 180/110 or higher), but you don't have symptoms. ? · You think high blood pressure is causing symptoms, such as:  ¨ Severe headache. ¨ Blurry vision. ? Watch closely for changes in your health, and be sure to contact your doctor if:  ? · Your blood pressure measures 140/90 or higher at least 2 times. That means the top number is 140 or higher or the bottom number is 90 or higher, or both. ? · You think you may be having side effects from your blood pressure medicine. ? · Your blood pressure is usually normal, but it goes above normal at least 2 times. Where can you learn more? Go to http://neel-stone.info/. Enter Y261 in the search box to learn more about \"High Blood Pressure: Care Instructions. \"  Current as of: September 21, 2016  Content Version: 11.4  © 1259-2191 [employer]. Care instructions adapted under license by [employer] (which disclaims liability or warranty for this information). If you have questions about a medical condition or this instruction, always ask your healthcare professional. [place] [address] any warranty or liability for your use of this information.
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
## 2018-06-11 NOTE — MR AVS SNAPSHOT
[facility] [address] 
[phone] Patient: Poly Mcclelland MRN: [id number] RVA:[id number] Visit Information Date & Time Provider Department Dept. Phone Encounter #  
 6/11/2018  8:45 AM John Foley MD [facility] [phone] [id number] Follow-up Instructions Return in about 4 months (around 10/11/2018). Your Appointments 9/18/2018  8:00 AM  
Follow Up with Shereen Gardner MD  
[facility] Appt Note: 5 mnth fu  
 [address] [facility] [address] [phone]  
  
   
 [address] [facility] [address] Upcoming Health Maintenance Date Due Influenza Age 5 to Adult 8/1/2018 MEDICARE YEARLY EXAM 9/29/2018 COLONOSCOPY 10/7/2020 DTaP/Tdap/Td series (2 - Td) 6/29/2027 Allergies as of 6/11/2018  Review Complete On: 6/11/2018 By: Cate Toledo LPN No Known Allergies Current Immunizations  Never Reviewed No immunizations on file. Not reviewed this visit You Were Diagnosed With   
  
 Codes Comments Essential hypertension    -  Primary ICD-10-CM: I10 
ICD-9-CM: 401.9 Gastroesophageal reflux disease without esophagitis     ICD-10-CM: K21.9 ICD-9-CM: 530.81 Vitals BP Pulse Temp Resp Height(growth percentile) Weight(growth percentile) 138/80 91 98.1 °F (36.7 °C) 15 5' 9\" (1.753 m) 286 lb (129.7 kg) SpO2 BMI Smoking Status 98% 42.23 kg/m2 Never Smoker BMI and BSA Data Body Mass Index Body Surface Area  
 42.23 kg/m 2 2.51 m 2 Preferred Pharmacy Pharmacy Name Phone [facility] [facility] [phone] Your Updated Medication List  
  
   
This list is accurate as of 6/11/18  9:15 AM.  Always use your most recent med list.  
  
  
  
  
 amLODIPine 10 mg tablet Commonly known as:  Orval Jaymie Take 1 Tab by mouth daily. amoxicillin 500 mg Tab Take  by mouth. atorvastatin 40 mg tablet Commonly known as:  LIPITOR Take  by mouth daily. BOTOX 200 unit injection Generic drug:  onabotulinumtoxinA  
  
 celecoxib 200 mg capsule Commonly known as:  CELEBREX Take 1 Cap by mouth two (2) times a day. CINNAMON 500 mg Cap Generic drug:  cinnamon bark Take  by mouth. diclofenac 1 % Gel Commonly known as:  VOLTAREN Apply  to affected area four (4) times daily. Apply to both knees qid as directed  
  
 doxycycline 100 mg capsule Commonly known as:  Hayden Last Take 100 mg by mouth daily. DULoxetine 30 mg capsule Commonly known as:  CYMBALTA Take 30 mg by mouth daily. finasteride 5 mg tablet Commonly known as:  PROSCAR Take 1 Tab by mouth daily. gabapentin 300 mg capsule Commonly known as:  NEURONTIN Take 300 mg by mouth three (3) times daily. Ice [facility] [employer] Ice pack right knee 3 times per day for 24 hours, then prn. Pt. May be weight -bearing as tolerated right leg.  
  
 meclizine 25 mg tablet Commonly known as:  ANTIVERT Take 1 Tab by mouth daily. Omeprazole delayed release 20 mg tablet Commonly known as:  PRILOSEC D/R Take 1 Tab by mouth daily. ONE-A-DAY MEN VITACRAVES 200 mcg Chew Generic drug:  multivit with min-folic acid Take  by mouth.  
  
 prazosin 5 mg capsule Commonly known as:  MINIPRESS Take  by mouth nightly. raNITIdine 150 mg tablet Commonly known as:  ZANTAC Take 1 Tab by mouth two (2) times a day. sildenafil citrate 100 mg tablet Commonly known as:  VIAGRA Take 1 Tab by mouth as needed. Indications: Erectile Dysfunction  
  
 tamsulosin 0.4 mg capsule Commonly known as:  FLOMAX Take 0.4 mg by mouth daily. telmisartan-hydroCHLOROthiazide 80-12.5 mg per tablet Commonly known as:  MICARDIS HCT  
 Take 1 Tab by mouth daily. traZODone 150 mg tablet Commonly known as:  Ward Ort Take 150 mg by mouth nightly. Follow-up Instructions Return in about 4 months (around 10/11/2018). Patient Instructions High Blood Pressure: Care Instructions Your Care Instructions If your blood pressure is usually above 140/90, you have high blood pressure, or hypertension. That means the top number is 140 or higher or the bottom number is 90 or higher, or both. Despite what a lot of people think, high blood pressure usually doesn't cause headaches or make you feel dizzy or lightheaded. It usually has no symptoms. But it does increase your risk for heart attack, stroke, and kidney or eye damage. The higher your blood pressure, the more your risk increases. Your doctor will give you a goal for your blood pressure. Your goal will be based on your health and your age. An example of a goal is to keep your blood pressure below 140/90. Lifestyle changes, such as eating healthy and being active, are always important to help lower blood pressure. You might also take medicine to reach your blood pressure goal. 
Follow-up care is a key part of your treatment and safety. Be sure to make and go to all appointments, and call your doctor if you are having problems. It's also a good idea to know your test results and keep a list of the medicines you take. How can you care for yourself at home? Medical treatment · If you stop taking your medicine, your blood pressure will go back up. You may take one or more types of medicine to lower your blood pressure. Be safe with medicines. Take your medicine exactly as prescribed. Call your doctor if you think you are having a problem with your medicine. · Talk to your doctor before you start taking aspirin every day. Aspirin can help certain people lower their risk of a heart attack or stroke.  But taking aspirin isn't right for everyone, because it can cause serious bleeding. · See your doctor regularly. You may need to see the doctor more often at first or until your blood pressure comes down. · If you are taking blood pressure medicine, talk to your doctor before you take decongestants or anti-inflammatory medicine, such as ibuprofen. Some of these medicines can raise blood pressure. · Learn how to check your blood pressure at home. Lifestyle changes · Stay at a healthy weight. This is especially important if you put on weight around the waist. Losing even 10 pounds can help you lower your blood pressure. · If your doctor recommends it, get more exercise. Walking is a good choice. Bit by bit, increase the amount you walk every day. Try for at least 30 minutes on most days of the week. You also may want to swim, bike, or do other activities. · Avoid or limit alcohol. Talk to your doctor about whether you can drink any alcohol. · Try to limit how much sodium you eat to less than 2,300 milligrams (mg) a day. Your doctor may ask you to try to eat less than 1,500 mg a day. · Eat plenty of fruits (such as bananas and oranges), vegetables, legumes, whole grains, and low-fat dairy products. · Lower the amount of saturated fat in your diet. Saturated fat is found in animal products such as milk, cheese, and meat. Limiting these foods may help you lose weight and also lower your risk for heart disease. · Do not smoke. Smoking increases your risk for heart attack and stroke. If you need help quitting, talk to your doctor about stop-smoking programs and medicines. These can increase your chances of quitting for good. When should you call for help? Call 911 anytime you think you may need emergency care. This may mean having symptoms that suggest that your blood pressure is causing a serious heart or blood vessel problem. Your blood pressure may be over 180/110. ? For example, call 911 if: ? · You have symptoms of a heart attack. These may include: ¨ Chest pain or pressure, or a strange feeling in the chest. 
¨ Sweating. ¨ Shortness of breath. ¨ Nausea or vomiting. ¨ Pain, pressure, or a strange feeling in the back, neck, jaw, or upper belly or in one or both shoulders or arms. ¨ Lightheadedness or sudden weakness. ¨ A fast or irregular heartbeat. ? · You have symptoms of a stroke. These may include: 
¨ Sudden numbness, tingling, weakness, or loss of movement in your face, arm, or leg, especially on only one side of your body. ¨ Sudden vision changes. ¨ Sudden trouble speaking. ¨ Sudden confusion or trouble understanding simple statements. ¨ Sudden problems with walking or balance. ¨ A sudden, severe headache that is different from past headaches. ? · You have severe back or belly pain. ?Do not wait until your blood pressure comes down on its own. Get help right away. ?Call your doctor now or seek immediate care if: 
? · Your blood pressure is much higher than normal (such as 180/110 or higher), but you don't have symptoms. ? · You think high blood pressure is causing symptoms, such as: ¨ Severe headache. ¨ Blurry vision. ? Watch closely for changes in your health, and be sure to contact your doctor if: 
? · Your blood pressure measures 140/90 or higher at least 2 times. That means the top number is 140 or higher or the bottom number is 90 or higher, or both. ? · You think you may be having side effects from your blood pressure medicine. ? · Your blood pressure is usually normal, but it goes above normal at least 2 times. Where can you learn more? Go to http://neel-stone.info/. Enter Z694 in the search box to learn more about \"High Blood Pressure: Care Instructions. \" Current as of: September 21, 2016 Content Version: 11.4 © 7315-1373 Healthwise, [employer].  Care instructions adapted under license by [address] (which disclaims liability or warranty for this information). If you have questions about a medical condition or this instruction, always ask your healthcare professional. [address] any warranty or liability for your use of this information. Introducing [facility]! Dear Letitia Russell: Thank you for requesting a vLine account. Our records indicate that you already have an active vLine account. You can access your account anytime at [email or web address] Did you know that you can access your hospital and ER discharge instructions at any time in vLine? You can also review all of your test results from your hospital stay or ER visit. Additional Information If you have questions, please visit the Frequently Asked Questions section of the vLine website at [email or web address] Remember, vLine is NOT to be used for urgent needs. For medical emergencies, dial 911. Now available from your iPhone and Android! Please provide this summary of care documentation to your next provider. Your primary care clinician is listed as [address]. If you have any questions after today's visit, please call [phone].

## 2018-06-11 NOTE — PROGRESS NOTES
Chief Complaint   Patient presents with    Hypertension    Headache     1. Have you been to the ER, urgent care clinic since your last visit? Hospitalized since your last visit? No    2. Have you seen or consulted any other health care providers outside of the [address] [place] [address] since your last visit? Include any pap smears or colon screening.  No

## 2018-07-09 ENCOUNTER — OFFICE VISIT (OUTPATIENT)
Dept: ORTHOPEDIC SURGERY | Age: 63
End: 2018-07-09

## 2018-07-09 VITALS
SYSTOLIC BLOOD PRESSURE: 131 MMHG | DIASTOLIC BLOOD PRESSURE: 76 MMHG | HEART RATE: 90 BPM | BODY MASS INDEX: 41.92 KG/M2 | RESPIRATION RATE: 18 BRPM | WEIGHT: 283 LBS | TEMPERATURE: 97.8 F | HEIGHT: 69 IN | OXYGEN SATURATION: 96 %

## 2018-07-09 DIAGNOSIS — E66.01 MORBID OBESITY WITH BMI OF 40.0-44.9, ADULT (HCC): ICD-10-CM

## 2018-07-09 DIAGNOSIS — M47.816 LUMBAR SPONDYLOSIS: Primary | ICD-10-CM

## 2018-07-09 DIAGNOSIS — M48.061 SPINAL STENOSIS OF LUMBAR REGION WITHOUT NEUROGENIC CLAUDICATION: ICD-10-CM

## 2018-07-09 DIAGNOSIS — M62.830 MUSCLE SPASM OF BACK: ICD-10-CM

## 2018-07-09 DIAGNOSIS — M19.90 ARTHRITIS: ICD-10-CM

## 2018-07-09 RX ORDER — CELECOXIB 200 MG/1
200 CAPSULE ORAL DAILY
Qty: 90 CAP | Refills: 1 | Status: SHIPPED | OUTPATIENT
Start: 2018-07-09 | End: 2019-11-27

## 2018-07-09 RX ORDER — OXYCODONE AND ACETAMINOPHEN 5; 325 MG/1; MG/1
TABLET ORAL
COMMUNITY
End: 2018-08-01 | Stop reason: ALTCHOICE

## 2018-07-09 NOTE — MR AVS SNAPSHOT
[facility] 
 
 
 [address] [facility] [address] 
[phone] Patient: Neris Rose MRN: [id number] Visit Information Date & Time Provider Department Dept. Phone Encounter #  
 7/9/2018  9:10 AM Freeman Meyer MD [facility] [[email or web address]] [phone] [id number] Follow-up Instructions Return in about 2 months (around 9/9/2018) for Medication follow up, PT follow up. Your Appointments 9/18/2018  8:00 AM  
Follow Up with Freeman Meyer MD  
[facility] [[email or web address]] [address]) Appt Note: 5 mnth fu  
 [address] [facility] [address]  
[phone]  
  
   
 [address]  
  
    
 10/4/2018  8:00 AM  
FOLLOW UP EXAM with Edmundo León MD  
[facility] Appt Note: 4mo f/u for htn  
 [facility] [address] [place] [address]  
[phone]  
  
   
 [facility] [address] [place] [address] Upcoming Health Maintenance Date Due Influenza Age 5 to Adult 8/1/2018 MEDICARE YEARLY EXAM 9/29/2018 COLONOSCOPY 10/7/2020 DTaP/Tdap/Td series (2 - Td) 6/29/2027 Allergies as of 7/9/2018  Review Complete On: 7/9/2018 By: Freeman Meyer MD  
 No Known Allergies Current Immunizations  Never Reviewed No immunizations on file. Not reviewed this visit You Were Diagnosed With   
  
 Codes Comments Lumbar spondylosis    -  Primary ICD-10-CM: V06.802 ICD-9-CM: 721.3 Muscle spasm of back     ICD-10-CM: F02.242 ICD-9-CM: 724.8 Spinal stenosis of lumbar region without neurogenic claudication     ICD-10-CM: M48.061 
ICD-9-CM: 724.02 Morbid obesity with BMI of 40.0-44.9, adult (HCC)     ICD-10-CM: E66.01, Z68.41 
ICD-9-CM: 278.01, V85.41  Arthritis     ICD-10-CM: M19.90 
 ICD-9-CM: 716.90 Vitals BP Pulse Temp Resp Height(growth percentile) Weight(growth percentile) 131/76 90 97.8 °F (36.6 °C) (Oral) 18 5' 9\" (1.753 m) 283 lb (128.4 kg) SpO2 BMI Smoking Status 96% 41.79 kg/m2 Never Smoker BMI and BSA Data Body Mass Index Body Surface Area 41.79 kg/m 2 2.5 m 2 Preferred Pharmacy Pharmacy Name Phone [facility], [address] [facility] [phone] Your Updated Medication List  
  
   
This list is accurate as of 7/9/18 10:03 AM.  Always use your most recent med list. amLODIPine 10 mg tablet Commonly known as:  Wandra Kathy Take 1 Tab by mouth daily. amoxicillin 500 mg Tab Take  by mouth. atorvastatin 40 mg tablet Commonly known as:  LIPITOR Take  by mouth daily. BOTOX 200 unit injection Generic drug:  onabotulinumtoxinA  
  
 celecoxib 200 mg capsule Commonly known as:  CELEBREX Take 1 Cap by mouth daily. Indications: OSTEOARTHRITIS CINNAMON 500 mg Cap Generic drug:  cinnamon bark Take  by mouth. diclofenac 1 % Gel Commonly known as:  VOLTAREN Apply  to affected area four (4) times daily. Apply to both knees qid as directed  
  
 doxycycline 100 mg capsule Commonly known as:  Daved Pollen Take 100 mg by mouth daily. DULoxetine 30 mg capsule Commonly known as:  CYMBALTA Take 30 mg by mouth daily. finasteride 5 mg tablet Commonly known as:  PROSCAR Take 1 Tab by mouth daily. gabapentin 300 mg capsule Commonly known as:  NEURONTIN Take 300 mg by mouth three (3) times daily. meclizine 25 mg tablet Commonly known as:  ANTIVERT Take 1 Tab by mouth daily. Omeprazole delayed release 20 mg tablet Commonly known as:  PRILOSEC D/R Take 1 Tab by mouth daily. ONE-A-DAY MEN VITACRAVES 200 mcg Chew Generic drug:  multivit with min-folic acid Take  by mouth. PERCOCET 5-325 mg per tablet Generic drug:  oxyCODONE-acetaminophen Take  by mouth every four (4) hours as needed for Pain.  
  
 prazosin 5 mg capsule Commonly known as:  MINIPRESS Take  by mouth nightly. raNITIdine 150 mg tablet Commonly known as:  ZANTAC Take 1 Tab by mouth two (2) times a day. sildenafil citrate 100 mg tablet Commonly known as:  VIAGRA Take 1 Tab by mouth as needed. Indications: Erectile Dysfunction  
  
 tamsulosin 0.4 mg capsule Commonly known as:  FLOMAX Take 0.4 mg by mouth daily. telmisartan-hydroCHLOROthiazide 80-12.5 mg per tablet Commonly known as:  MICARDIS HCT Take 1 Tab by mouth daily. traZODone 150 mg tablet Commonly known as:  [address] Take 150 mg by mouth nightly. Prescriptions Printed Refills  
 celecoxib (CELEBREX) 200 mg capsule 1 Sig: Take 1 Cap by mouth daily. Indications: OSTEOARTHRITIS Class: Print Route: Oral  
  
We Performed the Following REFERRAL TO PHYSICAL THERAPY [MSI90 Custom] Comments:  
 Eval/Treat Aqua Therapy. 2-3 visits per week x 2-3 weeks. HEP Follow-up Instructions Return in about 2 months (around 9/9/2018) for Medication follow up, PT follow up. Referral Information Referral ID Referred By Referred To  
  
 [id number] Tom Bernstein [address] [place] [address] VIEW   
   [address] [place] [address] [address] [place] [address], [address] Phone: [phone] Fax: [phone] Visits Status Start Date End Date 1 New Request 7/9/18 7/9/19 If your referral has a status of pending review or denied, additional information will be sent to support the outcome of this decision. Patient Instructions Low Back Arthritis: Exercises Your Care Instructions Here are some examples of typical rehabilitation exercises for your condition. Start each exercise slowly. Ease off the exercise if you start to have pain. Your doctor or physical therapist will tell you when you can start these exercises and which ones will work best for you. When you are not being active, find a comfortable position for rest. Some people are comfortable on the floor or a medium-firm bed with a small pillow under their head and another under their knees. Some people prefer to lie on their side with a pillow between their knees. Don't stay in one position for too long. Take short walks (10 to 20 minutes) every 2 to 3 hours. Avoid slopes, hills, and stairs until you feel better. Walk only distances you can manage without pain, especially leg pain. How to do the exercises Pelvic tilt 1. Lie on your back with your knees bent. 2. \"Brace\" your stomach-tighten your muscles by pulling in and imagining your belly button moving toward your spine. 3. Press your lower back into the floor. You should feel your hips and pelvis rock back. 4. Hold for 6 seconds while breathing smoothly. 5. Relax and allow your pelvis and hips to rock forward. 6. Repeat 8 to 12 times. Back stretches 1. Get down on your hands and knees on the floor. 2. Relax your head and allow it to droop. Round your back up toward the ceiling until you feel a nice stretch in your upper, middle, and lower back. Hold this stretch for as long as it feels comfortable, or about 15 to 30 seconds. 3. Return to the starting position with a flat back while you are on your hands and knees. 4. Let your back sway by pressing your stomach toward the floor. Lift your buttocks toward the ceiling. 5. Hold this position for 15 to 30 seconds. 6. Repeat 2 to 4 times. Follow-up care is a key part of your treatment and safety. Be sure to make and go to all appointments, and call your doctor if you are having problems. It's also a good idea to know your test results and keep a list of the medicines you take. Where can you learn more? Go to http://neel-stone.info/. Enter P427 in the search box to learn more about \"Low Back Arthritis: Exercises. \" Current as of: March 21, 2017 Content Version: 11.4 © 4351-4413 Healthwise, [employer]. Care instructions adapted under license by [employer] (which disclaims liability or warranty for this information). If you have questions about a medical condition or this instruction, always ask your healthcare professional. [address] any warranty or liability for your use of this information. Introducing [facility]! Dear Jason Alicea: Thank you for requesting a [employer] account. Our records indicate that you already have an active [employer] account. You can access your account anytime at [email or web address] Did you know that you can access your hospital and ER discharge instructions at any time in [employer]? You can also review all of your test results from your hospital stay or ER visit. Additional Information If you have questions, please visit the Frequently Asked Questions section of the [employer] website at [email or web address] Remember, [employer] is NOT to be used for urgent needs. For medical emergencies, dial 911. Now available from your iPhone and Android! Please provide this summary of care documentation to your next provider. Your primary care clinician is listed as [address]. If you have any questions after today's visit, please call [phone].
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
## 2018-07-09 NOTE — PATIENT INSTRUCTIONS

## 2018-07-11 ENCOUNTER — HOSPITAL ENCOUNTER (OUTPATIENT)
Dept: PHYSICAL THERAPY | Age: 63
Discharge: HOME | End: 2018-07-11
Payer: MEDICARE

## 2018-07-11 PROCEDURE — G8978 MOBILITY CURRENT STATUS: HCPCS

## 2018-07-11 PROCEDURE — G8979 MOBILITY GOAL STATUS: HCPCS

## 2018-07-11 PROCEDURE — 97162 PT EVAL MOD COMPLEX 30 MIN: CPT

## 2018-07-11 PROCEDURE — 97110 THERAPEUTIC EXERCISES: CPT

## 2018-07-11 NOTE — PROGRESS NOTES
[facility]  [address]  [place], [address]  [phone] fax    Plan of Care/ Statement of Necessity for Physical Therapy Services    Patient name: Meghan Paiz Start of Care: 2018   Referral source: Jesse Diaz MD : 1955    Medical Diagnosis: Spondylosis without myelopathy or radiculopathy, lumbar region [M47.816]  Muscle spasm of back [M62.830]  Spinal stenosis, lumbar region without neurogenic claudication [M48.061]  Unspecified osteoarthritis, unspecified site [M19.90]   Onset Date:1 year ago    Treatment Diagnosis: LB pain   Prior Hospitalization: see medical history Provider#: [id number]   Medications: Verified on Patient summary List    Comorbidities: Depression, arthritis, HTN   Prior Level of Function: Able to tolerate standing for longer than 5 minutes       The Plan of Care and following information is based on the information from the initial evaluation. Assessment/ key information: Pt is a 62 y/o male who presents with c/o constant low back pain. He states that his pain started \"out of the blue\" in 2017. Pt had a series of 4 injections (he thinks they were nerve block injections but is unsure) in Nov/Dec of 2017 and then in 2018 he had a [place] freezing\" procedure but states \"it didn't help\". 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 He reports having to constantly move and change positions. Pt states that forward bending \"feels good\". He has to take medication to fall asleep at night (pt is a side-sleeper). He lives with his wife in a one-level home with 4 CARLOS without HRs, but does have a ramp for entry. Pt has been ambulating with SPC since his first ankle surgery (2015). Pt reports 5 minutes or less standing tolerance.   Pt presents with constant LB/B hip pain, decreased activity tolerance, decreased trunk AROM and decreased LE strength. Pt would benefit from skilled PT to address the aforementioned impairments. Evaluation Complexity History MEDIUM  Complexity : 1-2 comorbidities / personal factors will impact the outcome/ POC ; Examination MEDIUM Complexity : 3 Standardized tests and measures addressing body structure, function, activity limitation and / or participation in recreation  ;Presentation MEDIUM Complexity : Evolving with changing characteristics  ; Clinical Decision Making MEDIUM Complexity : FOTO score of 26-74  Overall Complexity Rating: MEDIUM  Problem List: pain affecting function, decrease ROM, decrease strength, impaired gait/ balance, decrease ADL/ functional abilitiies, decrease activity tolerance, decrease flexibility/ joint mobility and decrease transfer abilities   Treatment Plan may include any combination of the following: Therapeutic exercise, Therapeutic activities, Neuromuscular re-education, Manual therapy, Aquatic therapy, Patient education and Functional mobility training  Patient / Family readiness to learn indicated by: asking questions and interest  Persons(s) to be included in education: patient (P)  Barriers to Learning/Limitations: None  Patient Goal (s): reduced or no pain  Patient Self Reported Health Status: good  Rehabilitation Potential: good    Short Term Goals: To be accomplished in 2 weeks:   1. Pt will be compliant and independent with HEP to promote self management of symptoms and maximize therapeutic benefits. 2. Pt will have decrease in worst pain to 5/10 on the PAS, to improve standing tolerance for meal prep. Long Term Goals: To be accomplished in 4 weeks:   1. Pt will have increase in FOTO score to 39 or greater in order to indicate significant improvement in functional status. 2. Pt will demonstrate improved trunk AROM in all planes to [facility], to improve ability to perform ADLs.      3. Pt will demonstrate increased B LE strength grossly throughout by 1/2 -1 MMT grade, to improve ability to perform transfers. 4. Pt will report improved standing tolerance to 30 minutes to improve ability to perform ADLs and meal prep. Frequency / Duration: Patient to be seen 2 times per week for 4 weeks. Patient/ Caregiver education and instruction: Diagnosis, prognosis, activity modification and exercises   [x]  Plan of care has been reviewed with PTA    G-Codes (GP)  Mobility   Current  CL= 60-79%   Goal  CL= 60-79%      The severity rating is based on clinical judgment and the FOTO score. Certification Period: 7/11/18 - 9/9/18  Zane Mensah, PT 7/11/2018 11:01 AM    ________________________________________________________________________    I certify that the above Therapy Services are being furnished while the patient is under my care. I agree with the treatment plan and certify that this therapy is necessary.     [de-identified] Signature:____________________  Date:____________Time: _________    Please sign and return to [facility]  [address]  [place], [address]  [phone] fax

## 2018-07-11 NOTE — PROGRESS NOTES
PT DAILY TREATMENT NOTE - [facility]     Patient Name: Jose Antonio Vu  Date:2018  : 1955  [x]  Patient  Verified  Payor: Coreen Hinkle / Plan: [place] MED[place] / Product Type: Medicare /    In time:9:00am  Out time:9:45am  Total Treatment Time (min): 45  Total Timed Codes (min): 10  1:1 Treatment Time ( [address] only): 39   Visit #: 1 of 8    Treatment Area: Spondylosis without myelopathy or radiculopathy, lumbar region [M47.816]  Muscle spasm of back [M62.830]  Spinal stenosis, lumbar region without neurogenic claudication [M48.061]  Unspecified osteoarthritis, unspecified site [M19.90]    SUBJECTIVE  Pain Level (0-10 scale): 10  Any medication changes, allergies to medications, adverse drug reactions, diagnosis change, or new procedure performed?: [x] No    [] Yes (see summary sheet for update)  Subjective functional status/changes:   [] No changes reported      OBJECTIVE      35 min [x]Eval                  []Re-Eval       10 min Therapeutic Exercise:  [] See flow sheet : HEP   Rationale: increase ROM and increase strength to improve the patients ability to perform ADLs and functional mobility tasks with improved ease and decreased pain. With   [] TE   [] TA   [] neuro   [] other: Patient Education: [x] Review HEP    [] Progressed/Changed HEP based on:   [] positioning   [] body mechanics   [] transfers   [] heat/ice application    [] other:      Other Objective/Functional Measures:      Pain Level (0-10 scale) post treatment: 5/10    ASSESSMENT/Changes in Function: see POC    Patient will continue to benefit from skilled PT services to modify and progress therapeutic interventions, address functional mobility deficits, address ROM deficits, address strength deficits, analyze and cue movement patterns, analyze and modify body mechanics/ergonomics and assess and modify postural abnormalities to attain remaining goals.      []  See Plan of Care  []  See progress note/recertification  [] See Discharge Summary         Progress towards goals / Updated goals:  Per POC    PLAN  []  Upgrade activities as tolerated     []  Continue plan of care  []  Update interventions per flow sheet       []  Discharge due to:_  []  Other:_      Max Congress, PT 7/11/2018  10:57 AM    Future Appointments  Date Time Provider Missy Jayne   7/17/2018 12:45 PM Max Congress, PT [facility]   7/19/2018 12:45 PM Max Congress, PT [facility]   7/24/2018 11:30 AM Jaylyn Rape [facility]   7/27/2018 10:00 AM Jaylyn Bustamante [facility]   7/31/2018 9:00 AM Max Congress, PT [facility]   8/2/2018 9:00 AM Max Congress, PT [facility]   8/7/2018 9:00 AM Max Congress, PT [facility]   9/18/2018 8:00 AM Abel Essex,  [address]    10/4/2018 8:00 AM Raheem Hirsch MD [facility]

## 2018-07-17 ENCOUNTER — HOSPITAL ENCOUNTER (OUTPATIENT)
Dept: PHYSICAL THERAPY | Age: 63
Discharge: HOME | End: 2018-07-17
Payer: MEDICARE

## 2018-07-17 PROCEDURE — 97113 AQUATIC THERAPY/EXERCISES: CPT

## 2018-07-17 NOTE — PROGRESS NOTES
PT DAILY TREATMENT NOTE - [facility]     Patient Name: Jose Antonio Vu  Date:2018  : 1955  [x]  Patient  Verified  Payor: Coreen Hinkle / Plan: [place] MEDICARE PART A & B / Product Type: Medicare /    In time:12:45pm  Out time:1:30pm  Total Treatment Time (min): 45  Total Timed Codes (min): 45  1:1 Treatment Time ([address] only): 39   Visit #: 2 of 8    Treatment Area: Low back pain [M54.5]    SUBJECTIVE  Pain Level (0-10 scale): 3/10  Any medication changes, allergies to medications, adverse drug reactions, diagnosis change, or new procedure performed?: [x] No    [] Yes (see summary sheet for update)  Subjective functional status/changes:   [] No changes reported  \"My back feels pretty good today\". OBJECTIVE      45 min Therapeutic Exercise:  [x] See flow sheet : Aquatic therapy    Rationale: increase ROM and increase strength to improve the patients ability to perform ADLs and functional mobility tasks with improved ease and decreased pain. With   [] TE   [] TA   [] neuro   [] other: Patient Education: [x] Review HEP    [] Progressed/Changed HEP based on:   [] positioning   [] body mechanics   [] transfers   [] heat/ice application    [] other:      Other Objective/Functional Measures: Initiated aquatic exercises today's session. Pain Level (0-10 scale) post treatment: 3/10    ASSESSMENT/Changes in Function: Pt tolerated first aquatic session very well and is highly motivated. Patient will continue to benefit from skilled PT services to modify and progress therapeutic interventions, address functional mobility deficits, address ROM deficits, address strength deficits, analyze and cue movement patterns, analyze and modify body mechanics/ergonomics and assess and modify postural abnormalities to attain remaining goals. []  See Plan of Care  []  See progress note/recertification  []  See Discharge Summary         Progress towards goals / Updated goals:  Short Term Goals:  To be accomplished in 2 weeks:                        1. Pt will be compliant and independent with HEP to promote self management of symptoms and maximize therapeutic benefits. Goal met: Pt reports compliance w/HEP without difficulty. (7/17/18). 2. Pt will have decrease in worst pain to 5/10 on the PAS, to improve standing tolerance for meal prep. Long Term Goals: To be accomplished in 4 weeks:                        1. Pt will have increase in FOTO score to 39 or greater in order to indicate significant improvement in functional status. 2. Pt will demonstrate improved trunk AROM in all planes to [facility], to improve ability to perform ADLs. 3. Pt will demonstrate increased B LE strength grossly throughout by 1/2 -1 MMT grade, to improve ability to perform transfers. 4. Pt will report improved standing tolerance to 30 minutes to improve ability to perform ADLs and meal prep.       PLAN  []  Upgrade activities as tolerated     [x]  Continue plan of care  []  Update interventions per flow sheet       []  Discharge due to:_  []  Other:_      Zane Batters, PT 7/17/2018  1:05 PM    Future Appointments  Date Time Provider Missy Godoy   7/19/2018 12:45 PM Zane Batters, PT [facility]   7/24/2018 11:30 AM Green Hurst [facility]   7/27/2018 10:00 AM Green Hurst [facility]   7/31/2018 9:00 AM Zane Batters, PT [facility]   8/2/2018 9:00 AM Zane Batters, PT [facility]   8/7/2018 9:00 AM Zane Batters, PT [facility]   9/18/2018 8:00 AM Lynette Patel  [address]    10/4/2018 8:00 AM MD Rhoda Toledo

## 2018-07-19 ENCOUNTER — HOSPITAL ENCOUNTER (OUTPATIENT)
Dept: PHYSICAL THERAPY | Age: 63
Discharge: HOME | End: 2018-07-19
Payer: MEDICARE

## 2018-07-19 PROCEDURE — 97113 AQUATIC THERAPY/EXERCISES: CPT

## 2018-07-19 NOTE — PROGRESS NOTES
PT DAILY TREATMENT NOTE - [facility]     Patient Name: Elliot Cortez  Date:2018  : 1955  [x]  Patient  Verified  Payor: Mariah Jones / Plan: [place] MEDICARE PART A & B / Product Type: Medicare /    In time:12:44pm  Out time:1:22pm  Total Treatment Time (min): 38  Total Timed Codes (min): 38  1:1 Treatment Time ( [address] only): 45   Visit #: 3 of 8    Treatment Area: Low back pain [M54.5]    SUBJECTIVE  Pain Level (0-10 scale): 3/10  Any medication changes, allergies to medications, adverse drug reactions, diagnosis change, or new procedure performed?: [x] No    [] Yes (see summary sheet for update)  Subjective functional status/changes:   [] No changes reported  Pt states that he just joined the [facility], so he'll have access to a pool following discharge from PT. He states, \"I think the water exercises are best for my back and my knees and ankle\". OBJECTIVE      38 min Therapeutic Exercise:  [x] See flow sheet : Aquatic therapy    Rationale: increase ROM and increase strength to improve the patients ability to perform ADLs and functional mobility tasks with improved ease and decreased pain. With   [] TE   [] TA   [] neuro   [] other: Patient Education: [x] Review HEP    [] Progressed/Changed HEP based on:   [] positioning   [] body mechanics   [] transfers   [] heat/ice application    [] other:      Other Objective/Functional Measures: Increased TM side-stepping speed to 1.0mph, and increased squats and UE horizontal abd/add and Shoulder extension to 15 reps each. Increased HS stretch hold to 60 seconds. Pain Level (0-10 scale) post treatment: 3/10    ASSESSMENT/Changes in Function: Pt had no difficulty with exercises and demonstrated good form. No change in pain level following treatment.       Patient will continue to benefit from skilled PT services to modify and progress therapeutic interventions, address functional mobility deficits, address ROM deficits, address strength deficits, analyze and cue movement patterns, analyze and modify body mechanics/ergonomics and assess and modify postural abnormalities to attain remaining goals. []  See Plan of Care  []  See progress note/recertification  []  See Discharge Summary         Progress towards goals / Updated goals:  Short Term Goals: To be accomplished in 2 weeks:                        7. Pt will be compliant and independent with HEP to promote self management of symptoms and maximize therapeutic benefits. Goal met: Pt reports compliance w/HEP without difficulty. (7/17/18).                       1. Pt will have decrease in worst pain to 5/10 on the PAS, to improve standing tolerance for meal prep. Progressing: Pt reports pain level no higher than a 4/10 over past few days. (7/19/18).    Long Term Goals: To be accomplished in 4 weeks:                        0. Pt will have increase in FOTO score to 39 or greater in order to indicate significant improvement in functional status.                       2. Pt will demonstrate improved trunk AROM in all planes to [facility], to improve ability to perform ADLs.                           0. Pt will demonstrate increased B LE strength grossly throughout by 1/2 -1 MMT grade, to improve ability to perform transfers.                          4. Pt will report improved standing tolerance to 30 minutes to improve ability to perform ADLs and meal prep.         PLAN  []  Upgrade activities as tolerated     [x]  Continue plan of care  []  Update interventions per flow sheet       []  Discharge due to:_  []  Other:_      Abigail Brandon PT 7/19/2018  12:46 PM    Future Appointments  Date Time Provider Missy Godoy   7/24/2018 11:30 AM [address] [place] [address]   7/27/2018 10:00 AM Antonina Larsen [facility]   7/31/2018 9:00 AM [facility]   8/2/2018 9:00 AM [facility]   8/7/2018 9:00 AM [facility]   9/18/2018 8:00 AM Etta Sam  [address]   10/4/2018 8:00 AM [facility]

## 2018-07-24 ENCOUNTER — HOSPITAL ENCOUNTER (OUTPATIENT)
Dept: PHYSICAL THERAPY | Age: 63
Discharge: HOME | End: 2018-07-24
Payer: MEDICARE

## 2018-07-24 PROCEDURE — 97113 AQUATIC THERAPY/EXERCISES: CPT

## 2018-07-24 NOTE — PROGRESS NOTES
PT DAILY TREATMENT NOTE - [facility]     Patient Name: Elliot Cortez  Date:2018  : 1955  [x]  Patient  Verified  Payor: Mariah Jones / Plan: [place] MEDICARE PART A & B / Product Type: Medicare /    In time:11:32am  Out time:12:01pm  Total Treatment Time (min): 29  Total Timed Codes (min): 29  1:1 Treatment Time ( [address] only): 29  Visit #: 4 of 8    Treatment Area: Low back pain [M54.5]    SUBJECTIVE  Pain Level (0-10 scale): 4  Any medication changes, allergies to medications, adverse drug reactions, diagnosis change, or new procedure performed?: [x] No    [] Yes (see summary sheet for update)  Subjective functional status/changes:   [] No changes reported  \"Fair to middling. \"     OBJECTIVE  29 min Therapeutic Exercise:  [x] See flow sheet : Aquatics   Rationale: increase ROM, increase strength and improve coordination to improve the patients ability to improve ease of ADLs. With   [] TE   [] TA   [] neuro   [] other: Patient Education: [x] Review HEP    [] Progressed/Changed HEP based on:   [] positioning   [] body mechanics   [] transfers   [] heat/ice application    [] other:      Other Objective/Functional Measures:     Pain Level (0-10 scale) post treatment: 3    ASSESSMENT/Changes in Function: Pt reports continued back pain, but does not appear limited during aquatic interventions. Patient will continue to benefit from skilled PT services to modify and progress therapeutic interventions, address functional mobility deficits, address ROM deficits, address strength deficits, analyze and address soft tissue restrictions, analyze and cue movement patterns, analyze and modify body mechanics/ergonomics and assess and modify postural abnormalities to attain remaining goals. []  See Plan of Care  []  See progress note/recertification  []  See Discharge Summary         Progress towards goals / Updated goals:  Short Term Goals: To be accomplished in 2 weeks:                        5.  Pt will be compliant and independent with HEP to promote self management of symptoms and maximize therapeutic benefits. Goal met: Pt reports compliance w/HEP without difficulty.  (7/17/18).                          5. Pt will have decrease in worst pain to 5/10 on the PAS, to improve standing tolerance for meal prep. Progressing: Pt reports pain level no higher than a 4/10 over past few days. (7/19/18).    Long Term Goals: To be accomplished in 4 weeks:                        7. Pt will have increase in FOTO score to 39 or greater in order to indicate significant improvement in functional status.                       1. Pt will demonstrate improved trunk AROM in all planes to [facility], to improve ability to perform ADLs.                           2. Pt will demonstrate increased B LE strength grossly throughout by 1/2 -1 MMT grade, to improve ability to perform transfers.                          4. Pt will report improved standing tolerance to 30 minutes to improve ability to perform ADLs and meal prep.      PLAN  []  Upgrade activities as tolerated     [x]  Continue plan of care  []  Update interventions per flow sheet       []  Discharge due to:_  []  Other:_      Lucie Maguire, PT, DPT, ATC 7/24/2018  11:32 AM    Future Appointments  Date Time Provider Missy Jayne   7/27/2018 10:00 AM Rex Rosas [facility]   7/31/2018 9:00 AM Maximino Perdue, PT [facility]   8/2/2018 9:00 AM Maximino Perdue, PT [facility]   8/7/2018 9:00 AM Maximino Perdue, PT [facility]   9/18/2018 8:00 AM [facility]ah Clemente  [facility]   10/4/2018 8:00 AM Dalton Norris MD [facility]

## 2018-07-27 ENCOUNTER — HOSPITAL ENCOUNTER (OUTPATIENT)
Dept: PHYSICAL THERAPY | Age: 63
Discharge: HOME | End: 2018-07-27
Payer: MEDICARE

## 2018-07-27 PROCEDURE — 97113 AQUATIC THERAPY/EXERCISES: CPT

## 2018-07-27 NOTE — PROGRESS NOTES
PT DAILY TREATMENT NOTE - [facility]     Patient Name: Jose Antonio Vu  Date:2018  : 1955  [x]  Patient  Verified  Payor: Coeren Hinkle / Plan: [place] MEDICARE PART A & B / Product Type: Medicare /    In time:10:01am  Out time:10:33am  Total Treatment Time (min): 32  Total Timed Codes (min): 32  1:1 Treatment Time ([address] only): 32   Visit #: 5 of 8    Treatment Area: Low back pain [M54.5]    SUBJECTIVE  Pain Level (0-10 scale): 6  Any medication changes, allergies to medications, adverse drug reactions, diagnosis change, or new procedure performed?: [x] No    [] Yes (see summary sheet for update)  Subjective functional status/changes:   [] No changes reported  \"We had a [employer] revival this week so I've been on my feet a lot. I'm hurting. \"    OBJECTIVE  32 min Therapeutic Exercise:  [x] See flow sheet : Aquatics   Rationale: increase ROM, increase strength and improve coordination to improve the patients ability to improve ease of ADLs. With   [] TE   [] TA   [] neuro   [] other: Patient Education: [x] Review HEP    [] Progressed/Changed HEP based on:   [] positioning   [] body mechanics   [] transfers   [] heat/ice application    [] other:      Other Objective/Functional Measures: some discomfort reported with flexion during hip 3 way, otherwise no adverse response noted     Pain Level (0-10 scale) post treatment: 5    ASSESSMENT/Changes in Function: Pt reports limited relief of back pain today, but no adverse response noted with exception of some discomfort with hip flexion during hip 3 way. Continue per POC. Patient will continue to benefit from skilled PT services to modify and progress therapeutic interventions, address functional mobility deficits, address ROM deficits, address strength deficits, analyze and address soft tissue restrictions, analyze and cue movement patterns, analyze and modify body mechanics/ergonomics and assess and modify postural abnormalities to attain remaining goals. []  See Plan of Care  []  See progress note/recertification  []  See Discharge Summary         Progress towards goals / Updated goals:  Short Term Goals: To be accomplished in 2 weeks:                        6. Pt will be compliant and independent with HEP to promote self management of symptoms and maximize therapeutic benefits. Goal met: Pt reports compliance w/HEP without difficulty.  (7/17/18).                          4. Pt will have decrease in worst pain to 5/10 on the PAS, to improve standing tolerance for meal prep. Progressing: Pt reports pain level no higher than a 4/10 over past few days. (7/19/18).     Long Term Goals: To be accomplished in 4 weeks:                        5. Pt will have increase in FOTO score to 39 or greater in order to indicate significant improvement in functional status.                       7. Pt will demonstrate improved trunk AROM in all planes to [facility], to improve ability to perform ADLs.                           9. Pt will demonstrate increased B LE strength grossly throughout by 1/2 -1 MMT grade, to improve ability to perform transfers.                          4. Pt will report improved standing tolerance to 30 minutes to improve ability to perform ADLs and meal prep.      PLAN  [x]  Upgrade activities as tolerated     [x]  Continue plan of care  []  Update interventions per flow sheet       []  Discharge due to:_  []  Other:_      Max Notice, PT, DPT, ATC 7/27/2018  10:10 AM    Future Appointments  Date Time Provider Missy Godoy   7/31/2018 9:00 AM Jewels [facility], PT [facility]   8/2/2018 9:00 AM [facility], PT [facility]   8/7/2018 9:00 AM [facility], PT [facility]   9/18/2018 8:00 AM Odin Monreal  [address]   10/4/2018 8:00 AM Vincent Quintero MD [facility]

## 2018-07-31 ENCOUNTER — HOSPITAL ENCOUNTER (OUTPATIENT)
Dept: PHYSICAL THERAPY | Age: 63
Discharge: HOME | End: 2018-07-31
Payer: MEDICARE

## 2018-07-31 PROCEDURE — G8979 MOBILITY GOAL STATUS: HCPCS

## 2018-07-31 PROCEDURE — G8980 MOBILITY D/C STATUS: HCPCS

## 2018-07-31 PROCEDURE — 97113 AQUATIC THERAPY/EXERCISES: CPT

## 2018-07-31 NOTE — PROGRESS NOTES
[facility]   [address]  [place], [address]  [phone] fax    Physical Therapy Discharge Summary    Patient name: Meghan Paiz Start of Care: 2018   Referral source: Jesse Diaz MD : 1955                         Medical Diagnosis: Spondylosis without myelopathy or radiculopathy, lumbar region [M47.816]  Muscle spasm of back [M62.830]  Spinal stenosis, lumbar region without neurogenic claudication [M48.061]  Unspecified osteoarthritis, unspecified site [M19.90] Onset Date:1 year ago                         Treatment Diagnosis: LB pain   Prior Hospitalization: see medical history Provider#: [id number]   Medications: Verified on Patient summary List    Comorbidities: Depression, arthritis, HTN   Prior Level of Function: Able to tolerate standing for longer than 5 minutes     Visits from Start of Care: 6    Missed Visits: 0  Reporting Period : 18 to 18    Summary of Care:  Pt reports no change in symptoms since beginning PT and requested to discharge today. He has access to a pool at the [facility] and states that he can continue with aqua exercises on his own. Pt was issuded a pictorial HEP for aquatic exercises. He reports having slight decrease in pain during and immediately following aquatic sessions, but states that \"the pain returns sometimes within minutes after I leave\". Improvement noted in LE strength, but no improvement in functional ability or pain level. Advised pt to f/u with M.D. Progress towards goals:  Short Term Goals:                         2. Pt will be compliant and independent with HEP to promote self management of symptoms and maximize therapeutic benefits. Goal met: Pt reports compliance w/HEP without difficulty.  (18).                          9. Pt will have decrease in worst pain to 5/10 on the PAS, to improve standing tolerance for meal prep.  Progressing: Pt reports pain level no higher than a 4/10 over past few days. (7/19/18). Not met: Pt reports worst pain level as 7-8/10 over past week when doing a lot of standing at [employer] activity. (7/31/18). [address]                        5. Pt will have increase in FOTO score to 39 or greater in order to indicate significant improvement in functional status. Not met: FOTO score of 28 (7/31/18).                       6. Pt will demonstrate improved trunk AROM in all planes to [facility], to improve ability to perform ADLs. Progressing: Trunk AROM: all planes 75% with pain reported in LB with all movement except forward flexion. (7/31/18).                       8. Pt will demonstrate increased B LE strength grossly throughout by 1/2 -1 MMT grade, to improve ability to perform transfers.  Goal met: MMT: B hip flex 4/5, B knee flex/ext 4+/5. (7/31/18).                       2. Pt will report improved standing tolerance to 30 minutes to improve ability to perform ADLs and meal prep.  Not met: Pt continues to report standing tolerance of only 5 minutes (he reports no difference between \"strenuous versus non-strenuous activities\").  (7/31/18). G-Codes (GP)  Mobility   [id number] Goal  CL= 60-79%  D/C  CL= 60-79%      The severity rating is based on clinical judgment and the FOTO score.     ASSESSMENT/RECOMMENDATIONS:  [x]Discontinue therapy: []Patient has reached or is progressing toward set goals      []Patient is non-compliant or has abdicated      [x]Due to lack of appreciable progress towards set Missy Harris PT 7/31/2018 9:33 AM

## 2018-07-31 NOTE — PROGRESS NOTES
PT DAILY TREATMENT NOTE - [facility]     Patient Name: Leslie Barton  Date:2018  : 1955  [x]  Patient  Verified  Payor: Antonia Forrest / Plan: [place] & B / Product Type: Medicare /    In time:9:00am  Out time:9:40am  Total Treatment Time (min): 40  Total Timed Codes (min): 40  1:1 Treatment Time ( [address] only): 40   Visit #: 6 of 8    Treatment Area: Low back pain [M54.5]    SUBJECTIVE  Pain Level (0-10 scale): 10  Any medication changes, allergies to medications, adverse drug reactions, diagnosis change, or new procedure performed?: [x] No    [] Yes (see summary sheet for update)  Subjective functional status/changes:   [] No changes reported  Pt states, \"I'm hurting today\". OBJECTIVE      40 min Therapeutic Exercise:  [x] See flow sheet : Aquatic therapy    Rationale: increase ROM and increase strength to improve the patients ability to perform ADLs and functional mobility tasks with improved ease and decreased pain. With   [] TE   [] TA   [] neuro   [] other: Patient Education: [x] Review HEP    [] Progressed/Changed HEP based on:   [] positioning   [] body mechanics   [] transfers   [] heat/ice application    [] other:      Other Objective/Functional Measures: Issued pt a pictorial HEP for aquatic exercises. Trunk AROM: all planes 75% with pain reported in LB with all movement except forward flexion. MMT: B hip flex 4/5, B knee flex/ext 4+/5. FOTO score of 28 (decreased from 32 at initial evaluation). Pain Level (0-10 scale) post treatment: 3/10    ASSESSMENT/Changes in Function: Pt reports no change in symptoms since beginning PT and requested to discharge today. He has access to a pool at the  [facility] and states that he can continue with aqua exercises on his own. He reports having slight decrease in pain during and immediately following aquatic sessions, but states that \"the pain returns sometimes within minutes after I leave\".   Improvement noted in LE strength, but no improvement in functional ability. Advised pt to f/u with M.D. []  See Plan of Care  []  See progress note/recertification  [x]  See Discharge Summary         Progress towards goals / Updated goals:  Short Term Goals: To be accomplished in 2 weeks:                        6. Pt will be compliant and independent with HEP to promote self management of symptoms and maximize therapeutic benefits. Goal met: Pt reports compliance w/HEP without difficulty.  (7/17/18).                          9. Pt will have decrease in worst pain to 5/10 on the PAS, to improve standing tolerance for meal prep. Progressing: Pt reports pain level no higher than a 4/10 over past few days. (7/19/18). Not met: Pt reports worst pain level as 7-8/10 over past week when doing a lot of standing at [employer]. (7/31/18).     Long Term Goals: To be accomplished in 4 weeks:                        6. Pt will have increase in FOTO score to 39 or greater in order to indicate significant improvement in functional status. Not met: FOTO score of 28 (7/31/18).                       7. Pt will demonstrate improved trunk AROM in all planes to [facility], to improve ability to perform ADLs. Progressing: Trunk AROM: all planes 75% with pain reported in LB with all movement except forward flexion. (7/31/18).                       7. Pt will demonstrate increased B LE strength grossly throughout by 1/2 -1 MMT grade, to improve ability to perform transfers.  Goal met: MMT: B hip flex 4/5, B knee flex/ext 4+/5. (7/31/18).                          6. Pt will report improved standing tolerance to 30 minutes to improve ability to perform ADLs and meal prep.  Not met: Pt continues to report standing tolerance of only 5 minutes (he reports no difference between \"strenuous versus non-strenuous activities\").  (7/31/18).         PLAN  []  Upgrade activities as tolerated     []  Continue plan of care  []  Update interventions per flow sheet       [x]  Discharge due to:_  []  Other:_      Mario Guerrero, PT 7/31/2018  9:25 AM    Future Appointments  Date Time Provider Missy Godoy   8/2/2018 9:00 AM Mario Guerrero, PT [facility]   8/7/2018 9:00 AM Mario Guerrero, PT [facility]   9/18/2018 8:00 AM Rodney Carr  [address]    10/4/2018 8:00 AM Bolivar Lockhart MD [facility]

## 2018-08-01 ENCOUNTER — OFFICE VISIT (OUTPATIENT)
Dept: ORTHOPEDIC SURGERY | Age: 63
End: 2018-08-01

## 2018-08-01 VITALS
DIASTOLIC BLOOD PRESSURE: 74 MMHG | TEMPERATURE: 98.7 F | HEIGHT: 69 IN | WEIGHT: 283 LBS | RESPIRATION RATE: 14 BRPM | SYSTOLIC BLOOD PRESSURE: 123 MMHG | OXYGEN SATURATION: 97 % | BODY MASS INDEX: 41.92 KG/M2 | HEART RATE: 96 BPM

## 2018-08-01 DIAGNOSIS — M48.062 SPINAL STENOSIS OF LUMBAR REGION WITH NEUROGENIC CLAUDICATION: Primary | ICD-10-CM

## 2018-08-01 DIAGNOSIS — M62.830 MUSCLE SPASM OF BACK: ICD-10-CM

## 2018-08-01 DIAGNOSIS — M47.816 LUMBAR FACET ARTHROPATHY: ICD-10-CM

## 2018-08-01 DIAGNOSIS — M47.26 OSTEOARTHRITIS OF SPINE WITH RADICULOPATHY, LUMBAR REGION: ICD-10-CM

## 2018-08-01 RX ORDER — GABAPENTIN 300 MG/1
CAPSULE ORAL
Qty: 360 CAP | Refills: 1 | Status: SHIPPED | OUTPATIENT
Start: 2018-08-01 | End: 2019-03-08

## 2018-08-01 RX ORDER — KETOROLAC TROMETHAMINE 15 MG/ML
60 INJECTION, SOLUTION INTRAMUSCULAR; INTRAVENOUS ONCE
Qty: 1 VIAL | Refills: 0
Start: 2018-08-01 | End: 2018-08-01

## 2018-08-01 NOTE — PATIENT INSTRUCTIONS
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
## 2018-08-01 NOTE — MR AVS SNAPSHOT
[address] [facility] [address] [facility] [address] 
[phone] Patient: Js Dailey MRN: [id number] LZR:[id number] Visit Information Date & Time Provider Department Dept. Phone Encounter #  
 8/1/2018 11:00 AM Karmen Nguyen MD [facility] [phone] [id number] Follow-up Instructions Return in about 1 month (around 9/1/2018) for Medication follow up, injection follow up. Your Appointments 10/4/2018  8:00 AM  
FOLLOW UP EXAM with Chung Waldron MD  
[facility]) Appt Note: 4mo f/u for htn  
 [facility] [address] [place] [address]  
[phone]  
  
   
 [facility] [address] [place] [address] Upcoming Health Maintenance Date Due Influenza Age 5 to Adult 8/1/2018 MEDICARE YEARLY EXAM 9/29/2018 COLONOSCOPY 10/7/2020 DTaP/Tdap/Td series (2 - Td) 6/29/2027 Allergies as of 8/1/2018  Review Complete On: 8/1/2018 By: Fiona Lawrence LPN No Known Allergies Current Immunizations  Never Reviewed No immunizations on file. Not reviewed this visit You Were Diagnosed With   
  
 Codes Comments Spinal stenosis of lumbar region with neurogenic claudication    -  Primary ICD-10-CM: S22.530 
ICD-9-CM: 724.03 Lumbar facet arthropathy (HCC)     ICD-10-CM: M46.96 
ICD-9-CM: 721.3 Muscle spasm of back     ICD-10-CM: E31.282 ICD-9-CM: 724.8 Osteoarthritis of spine with radiculopathy, lumbar region     ICD-10-CM: M47.26 
ICD-9-CM: 721.3 Vitals BP Pulse Temp Resp Height(growth percentile) Weight(growth percentile) 123/74 96 98.7 °F (37.1 °C) (Oral) 14 5' 9\" (1.753 m) 283 lb (128.4 kg) SpO2 BMI Smoking Status 97% 41.79 kg/m2 Never Smoker BMI and BSA Data Body Mass Index Body Surface Area  41.79 kg/m 2 2.5 m 2  
  
  
 Preferred Pharmacy Pharmacy Name Phone [facility] [address] [facility] [phone] Your Updated Medication List  
  
   
This list is accurate as of 8/1/18 12:15 PM.  Always use your most recent med list. amLODIPine 10 mg tablet Commonly known as:  Vernon Citron Take 1 Tab by mouth daily. amoxicillin 500 mg Tab Take  by mouth as needed. atorvastatin 40 mg tablet Commonly known as:  LIPITOR Take  by mouth daily. BOTOX 200 unit injection Generic drug:  onabotulinumtoxinA  
  
 celecoxib 200 mg capsule Commonly known as:  CELEBREX Take 1 Cap by mouth daily. Indications: OSTEOARTHRITIS CINNAMON 500 mg Cap Generic drug:  cinnamon bark Take  by mouth. diclofenac 1 % Gel Commonly known as:  VOLTAREN Apply  to affected area four (4) times daily. Apply to both knees qid as directed  
  
 doxycycline 100 mg capsule Commonly known as:  Phillip Been Take 100 mg by mouth daily. DULoxetine 30 mg capsule Commonly known as:  CYMBALTA Take 30 mg by mouth daily. finasteride 5 mg tablet Commonly known as:  PROSCAR Take 1 Tab by mouth daily. gabapentin 300 mg capsule Commonly known as:  NEURONTIN  
2 in the morning and 2 in the evening as directed  Indications: NEUROPATHIC PAIN  
  
 ketorolac 15 mg/mL Soln injection Commonly known as:  TORADOL  
4 mL by IntraMUSCular route once for 1 dose. meclizine 25 mg tablet Commonly known as:  ANTIVERT Take 1 Tab by mouth daily. Omeprazole delayed release 20 mg tablet Commonly known as:  PRILOSEC D/R Take 1 Tab by mouth daily. ONE-A-DAY MEN VITACRAVES 200 mcg Chew Generic drug:  multivit with min-folic acid Take  by mouth.  
  
 prazosin 5 mg capsule Commonly known as:  MINIPRESS Take  by mouth nightly. raNITIdine 150 mg tablet Commonly known as:  ZANTAC Take 1 Tab by mouth two (2) times a day. sildenafil citrate 100 mg tablet Commonly known as:  VIAGRA Take 1 Tab by mouth as needed. Indications: Erectile Dysfunction  
  
 tamsulosin 0.4 mg capsule Commonly known as:  FLOMAX Take 0.4 mg by mouth daily. traZODone 150 mg tablet Commonly known as:  Ruy Vernon Take 150 mg by mouth nightly. Prescriptions Printed Refills  
 gabapentin (NEURONTIN) 300 mg capsule 1 Si in the morning and 2 in the evening as directed  Indications: NEUROPATHIC PAIN Class: Print We Performed the Following KETOROLAC TROMETHAMINE INJ [ [facility]] MS THER/PROPH/DIAG INJECTION, SUBCUT/IM [id number] CPT(R)] SCHEDULE SURGERY [[id number] Custom] Follow-up Instructions Return in about 1 month (around 2018) for Medication follow up, injection follow up. Patient Instructions Low Back Arthritis: Exercises Your Care Instructions Here are some examples of typical rehabilitation exercises for your condition. Start each exercise slowly. Ease off the exercise if you start to have pain. Your doctor or physical therapist will tell you when you can start these exercises and which ones will work best for you. When you are not being active, find a comfortable position for rest. Some people are comfortable on the floor or a medium-firm bed with a small pillow under their head and another under their knees. Some people prefer to lie on their side with a pillow between their knees. Don't stay in one position for too long. Take short walks (10 to 20 minutes) every 2 to 3 hours. Avoid slopes, hills, and stairs until you feel better. Walk only distances you can manage without pain, especially leg pain. How to do the exercises Pelvic tilt 1. Lie on your back with your knees bent. 2. \"Brace\" your stomach-tighten your muscles by pulling in and imagining your belly button moving toward your spine. 3. Press your lower back into the floor.  You should feel your hips and pelvis rock back. 4. Hold for 6 seconds while breathing smoothly. 5. Relax and allow your pelvis and hips to rock forward. 6. Repeat 8 to 12 times. Back stretches 1. Get down on your hands and knees on the floor. 2. Relax your head and allow it to droop. Round your back up toward the ceiling until you feel a nice stretch in your upper, middle, and lower back. Hold this stretch for as long as it feels comfortable, or about 15 to 30 seconds. 3. Return to the starting position with a flat back while you are on your hands and knees. 4. Let your back sway by pressing your stomach toward the floor. Lift your buttocks toward the ceiling. 5. Hold this position for 15 to 30 seconds. 6. Repeat 2 to 4 times. Follow-up care is a key part of your treatment and safety. Be sure to make and go to all appointments, and call your doctor if you are having problems. It's also a good idea to know your test results and keep a list of the medicines you take. Where can you learn more? Go to http://neel-stone.info/. Enter Q277 in the search box to learn more about \"Low Back Arthritis: Exercises. \" Current as of: November 29, 2017 Content Version: 11.7 © 3728-3303 [employer]. Care instructions adapted under license by [employer] (which disclaims liability or warranty for this information). If you have questions about a medical condition or this instruction, always ask your healthcare professional. [place] [address] any warranty or liability for your use of this information. Introducing [facility]! Dear Freddy Hirsch: Thank you for requesting a [employer] account. Our records indicate that you already have an active [employer] account. You can access your account anytime at [email or web address] Did you know that you can access your hospital and ER discharge instructions at any time in [employer]?   You can also review all of your test results from your hospital stay or ER visit. Additional Information If you have questions, please visit the Frequently Asked Questions section of the [employer] website at [email or web address] Remember, [employer] is NOT to be used for urgent needs. For medical emergencies, dial 911. Now available from your iPhone and Android! Please provide this summary of care documentation to your next provider. Your primary care clinician is listed as [id number] [facility]. If you have any questions after today's visit, please call [phone].

## 2018-08-02 ENCOUNTER — APPOINTMENT (OUTPATIENT)
Dept: PHYSICAL THERAPY | Age: 63
End: 2018-08-02

## 2018-08-07 ENCOUNTER — APPOINTMENT (OUTPATIENT)
Dept: PHYSICAL THERAPY | Age: 63
End: 2018-08-07

## 2018-08-15 ENCOUNTER — APPOINTMENT (OUTPATIENT)
Dept: GENERAL RADIOLOGY | Age: 63
End: 2018-08-15
Attending: PHYSICAL MEDICINE & REHABILITATION
Payer: MEDICARE

## 2018-08-15 ENCOUNTER — HOSPITAL ENCOUNTER (OUTPATIENT)
Age: 63
Setting detail: OUTPATIENT SURGERY
Discharge: HOME | End: 2018-08-15
Attending: PHYSICAL MEDICINE & REHABILITATION | Admitting: PHYSICAL MEDICINE & REHABILITATION
Payer: MEDICARE

## 2018-08-15 VITALS
TEMPERATURE: 98.5 F | OXYGEN SATURATION: 97 % | SYSTOLIC BLOOD PRESSURE: 122 MMHG | BODY MASS INDEX: 40.88 KG/M2 | HEIGHT: 69 IN | HEART RATE: 88 BPM | WEIGHT: 276 LBS | RESPIRATION RATE: 14 BRPM | DIASTOLIC BLOOD PRESSURE: 84 MMHG

## 2018-08-15 PROCEDURE — 77030014124 HC TY EPDRL BBMI -A: Performed by: PHYSICAL MEDICINE & REHABILITATION

## 2018-08-15 PROCEDURE — 74011250636 HC RX REV CODE- 250/636: Performed by: PHYSICAL MEDICINE & REHABILITATION

## 2018-08-15 PROCEDURE — 74011250637 HC RX REV CODE- 250/637: Performed by: PHYSICAL MEDICINE & REHABILITATION

## 2018-08-15 PROCEDURE — 74011250636 HC RX REV CODE- 250/636

## 2018-08-15 PROCEDURE — 77030003672 HC NDL SPN HALY -A: Performed by: PHYSICAL MEDICINE & REHABILITATION

## 2018-08-15 PROCEDURE — 76010000009 HC PAIN MGT 0 TO 30 MIN PROC: Performed by: PHYSICAL MEDICINE & REHABILITATION

## 2018-08-15 PROCEDURE — 74011636320 HC RX REV CODE- 636/320

## 2018-08-15 PROCEDURE — 74011636320 HC RX REV CODE- 636/320: Performed by: PHYSICAL MEDICINE & REHABILITATION

## 2018-08-15 PROCEDURE — 77030039433 HC TY MYLEOGRAM BD -B: Performed by: PHYSICAL MEDICINE & REHABILITATION

## 2018-08-15 RX ORDER — DIAZEPAM 5 MG/1
5-20 TABLET ORAL ONCE
Status: COMPLETED | OUTPATIENT
Start: 2018-08-15 | End: 2018-08-15

## 2018-08-15 RX ORDER — SODIUM CHLORIDE 0.9 % (FLUSH) 0.9 %
5-10 SYRINGE (ML) INJECTION AS NEEDED
Status: DISCONTINUED | OUTPATIENT
Start: 2018-08-15 | End: 2018-08-15 | Stop reason: HOSPADM

## 2018-08-15 RX ORDER — DEXAMETHASONE SODIUM PHOSPHATE 100 MG/10ML
INJECTION INTRAMUSCULAR; INTRAVENOUS AS NEEDED
Status: DISCONTINUED | OUTPATIENT
Start: 2018-08-15 | End: 2018-08-15 | Stop reason: HOSPADM

## 2018-08-15 RX ORDER — LIDOCAINE HYDROCHLORIDE 10 MG/ML
INJECTION, SOLUTION EPIDURAL; INFILTRATION; INTRACAUDAL; PERINEURAL AS NEEDED
Status: DISCONTINUED | OUTPATIENT
Start: 2018-08-15 | End: 2018-08-15 | Stop reason: HOSPADM

## 2018-08-15 RX ADMIN — DIAZEPAM 10 MG: 5 TABLET ORAL at 12:43

## 2018-08-15 NOTE — PROCEDURES
Facet Joint Block Procedure Note    Patient Name: Meghan Paiz    Date of Procedure: August 15, 2018    Preoperative Diagnosis:   Lumbar facet arthropathy, muscle spasm, back, spondylosis    Post Operative Diagnosis: Procedure:   Same    bilateral  L4-L5 Facet Joint Block    Consent: Informed consent was obtained prior to the procedure. The patient was given the opportunity to ask questions regarding the procedure and its associated risks. In addition to the potential risks associated with the procedure itself, the patient was informed both verbally and in writing of potential side effects of the use of glucocorticoids. The patient appeared to comprehend the informed consent and desired to have the procedure perfored. Procedure: The patient was placed in the prone position on the flouroscopy table and the back was prepped and draped in the usual sterile manner. At each blocked level, the exact location of the facet joint was identified with flouroscopy, and after local Lidocaine 1% injection, a #22 gauge spinal needle was then advanced toward the joint. A total of 15 mg of preservative free Dexamethasone and 2.5 cc of Lidocaine was injected around and equally divided among all of the sites. The patient tolerated the procedure well. The injection area was cleaned and bandaids applied. No excessive bleeding was noted. Patient dressed and was discharged to home with instructions. Discussion: tolerated the procedure well with no complications    Sylvester Worrell MD  August 15, 2018     Intralaminar Epidural Steroid Block Procedure Note      Patient Name: Meghan Paiz    Date of Procedure: August 15, 2018    Preoperative Diagnosis:   Lumbar spinal stenosis with neurogenic claudication, lumbar radiculopathy    Post Operative Diagnosis:   Same    Procedure: L3 Epidural Block     PROCEDURE:  Lumbar Epidural Block    Consent:  Informed  Consent was obtained prior to the procedure.   The patient was given the opportunity to ask questions regarding the procedure and its associated risks. In addition to the potential risks associated with the procedure itself, the patient was informed both verbally and in writing of potential side effects of the use glucocorticoids. The patient appeared to comprehend the informed consent and desired to have the procedure performed. The patient was placed in the prone position on the fluoroscopy table and the back prepped and draped in the usual sterile manner. The interlaminar space was identified using fluoroscopy and the skin anesthetized with 1% Lidocaine. A #18 Tuohy Epidural needle was advanced under fluoroscopic control from a paramedian approach to the  epidural space as noted above, using the loss of resistance to fluid technique. Then, 15 mg of preservative free Dexamethasone and 2.5 cc of Lidocaine was slowly injected. The patient tolerated the procedure well. The injection area was cleaned and band aids applied. No excessive bleeding was noted. Patient dressed and was discharged to home with instructions.

## 2018-08-15 NOTE — PROGRESS NOTES
Patient discharged to home in stable condition. Band aid applied to injection site clean, dry and intact. Assist off table without difficulty. Pt ambulate out of clinic with family in stable condition.

## 2018-08-15 NOTE — DISCHARGE INSTRUCTIONS
[facility]   (JAYLENE)  Dr. Estelle Culver, Dr. Geovanny Gan, Dr. Evelia Slade not drive a car, operate heavy machinery or dangerous equipment for 24 hours. * Activity as tolerated; rest for the remainder of the day. * Resume pre-block medications including those for your family doctor. * Do not drink alcoholic beverages for 24 hours. Alcohol and the medications you have received may interact and cause an adverse reaction. * You may feel better this evening and worse tomorrow, as the numbing medications wears off and the steroid has yet to begin to work. After 48 hrs the steroid should begin to release bringing you relief. * You may shower this evening and remove any bandages. * Avoid hot tubs and heating pads for 24 hours. You may use cold packs on the procedure site as tolerated for the first 24 hours. * If a headache develops, drink plenty of fluids and rest.  Take over the counter medications for headache if needed. If the headache continues longer than 24 hours, call MD at the [address] [place] [address]. [phone]    * Continue taking pain medications as needed. * You may resume your regular diet if tolerated. Otherwise, start with sips of water and advance slowly. * If Diabetic: check your blood sugar three times a day for the next 3 days. If your sugar is greater than 300 call your family doctor. If your sugar is greater than 400, have someone transport you to the nearest Emergency Room. * If you experience any of the following problems, Please Call the [address] [place] [address] at [phone].         * Shortness of Breath    * Fever of 101 or higher    * Nausea / Vomiting    * Severe Headache    * Weakness or numbness in arms or legs that is not      resolving    * Prolonged increase in pain greater than 4 days      DISCHARGE SUMMARY from Nurse      PATIENT INSTRUCTIONS:    After oral sedation, for 24 hours or while taking prescription Narcotics:  · Limit your activities  · Do not drive and operate hazardous machinery  · Do not make important personal or business decisions  · Do  not drink alcoholic beverages  · If you have not urinated within 8 hours after discharge, please contact your surgeon on call. Report the following to your surgeon:  · Excessive pain, swelling, redness or odor of or around the surgical area  · Temperature over 101  · Nausea and vomiting lasting longer than 4 hours or if unable to take medications  · Any signs of decreased circulation or nerve impairment to extremity: change in color, persistent  numbness, tingling, coldness or increase pain  · Any questions            What to do at Home:  Recommended activity: Activity as tolerated, NO DRIVING FOR 12 Hours post injection          *  Please give a list of your current medications to your Primary Care Provider. *  Please update this list whenever your medications are discontinued, doses are      changed, or new medications (including over-the-counter products) are added. *  Please carry medication information at all times in case of emergency situations. These are general instructions for a healthy lifestyle:    No smoking/ No tobacco products/ Avoid exposure to second hand smoke    Surgeon General's Warning:  Quitting smoking now greatly reduces serious risk to your health. Obesity, smoking, and sedentary lifestyle greatly increases your risk for illness    A healthy diet, regular physical exercise & weight monitoring are important for maintaining a healthy lifestyle    You may be retaining fluid if you have a history of heart failure or if you experience any of the following symptoms:  Weight gain of 3 pounds or more overnight or 5 pounds in a week, increased swelling in our hands or feet or shortness of breath while lying flat in bed.   Please call your doctor as soon as you notice any of these symptoms; do not wait until your next office visit. Recognize signs and symptoms of STROKE:    F-face looks uneven    A-arms unable to move or move unevenly    S-speech slurred or non-existent    T-time-call 911 as soon as signs and symptoms begin-DO NOT go       Back to bed or wait to see if you get better-TIME IS BRAIN.

## 2018-08-15 NOTE — H&P
Date of Surgery Update:  Pilar Erickson was seen and examined. History and physical has been reviewed. The patient has been examined. There have been no significant clinical changes since the completion of the last office visit.       Signed By: Ary Yeager MD     August 15, 2018 12:54 PM

## 2018-08-17 ENCOUNTER — PATIENT OUTREACH (OUTPATIENT)
Dept: FAMILY MEDICINE CLINIC | Age: 63
End: 2018-08-17

## 2018-08-17 NOTE — PROGRESS NOTES
Hospital Discharge Follow-Up      Date/Time:  2018 6:48 PM    Patient was admitted to [facility] on 8/15/18 and discharged on 8/15/18 for Bilateral L4/L5 Facet Joint Block. Patient was contacted within 2 business days of discharge. Nurse Navigator (NN) contacted the patient by telephone to perform post hospital discharge assessment. Verified name and  with patient as identifiers. Provided introduction to self, and explanation of the Nurse Navigator role. Patient states that he is doing well. Patient states that his pain is a lot better. Patient denied problems with  Bowel and bladder and movement. Unable to complete general assessment, ADL, HTN and Medrec as there's some problem with patient's phone. NN asked for alternative for number, patient states that his wife is not home at this time. Patient asked NN to call back some other time as patient states that theres problem with his cell phone.

## 2018-09-07 ENCOUNTER — PATIENT OUTREACH (OUTPATIENT)
Dept: FAMILY MEDICINE CLINIC | Age: 63
End: 2018-09-07

## 2018-09-07 NOTE — PROGRESS NOTES
Follow-Up      Date/Time:  9/7/2018 3:31 PM    Patient was admitted to [facility] on 8/15/18 and discharged on 8/15/18 for Bilateral L4/L5 Facet Joint Block. Attempt to contact patient via telephone on 9/7/18 for follow up. Unable to reach. Left message on voicemail with office contact information. No Patient medical information left on message.

## 2018-09-18 ENCOUNTER — OFFICE VISIT (OUTPATIENT)
Dept: ORTHOPEDIC SURGERY | Age: 63
End: 2018-09-18

## 2018-09-18 VITALS
WEIGHT: 285.2 LBS | DIASTOLIC BLOOD PRESSURE: 75 MMHG | TEMPERATURE: 98.2 F | HEART RATE: 86 BPM | RESPIRATION RATE: 19 BRPM | OXYGEN SATURATION: 96 % | BODY MASS INDEX: 42.24 KG/M2 | SYSTOLIC BLOOD PRESSURE: 136 MMHG | HEIGHT: 69 IN

## 2018-09-18 DIAGNOSIS — M47.816 LUMBAR FACET ARTHROPATHY: Primary | ICD-10-CM

## 2018-09-18 DIAGNOSIS — M48.061 SPINAL STENOSIS OF LUMBAR REGION WITHOUT NEUROGENIC CLAUDICATION: ICD-10-CM

## 2018-09-18 DIAGNOSIS — M47.816 LUMBAR SPONDYLOSIS: ICD-10-CM

## 2018-09-18 RX ORDER — GLUCOSAMINE SULFATE 1500 MG
1000 POWDER IN PACKET (EA) ORAL DAILY
COMMUNITY
Start: 2018-09-11
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
## 2018-09-18 NOTE — MR AVS SNAPSHOT
[address] [facility] [address] [facility] [address] 
[phone] Patient: Avril Crawford MRN: [id number] WIW:[id number] Visit Information Date & Time Provider Department Dept. Phone Encounter #  
 9/18/2018  9:50 AM Eliz Griffith NP [facility] [phone] [id number] Follow-up Instructions Return in about 4 weeks (around 10/16/2018) for shonda PIERCE. Follow-up and Disposition History Your Appointments 10/4/2018  8:00 AM  
FOLLOW UP EXAM with Flora Jacobs MD  
[facility]) Appt Note: 4mo f/u for htn  
 [facility] [address] [place] [address]  
[phone]  
  
   
 [facility] [address] [place] [address] Upcoming Health Maintenance Date Due Influenza Age 5 to Adult 8/1/2018 MEDICARE YEARLY EXAM 9/29/2018 COLONOSCOPY 10/7/2020 DTaP/Tdap/Td series (2 - Td) 6/29/2027 Allergies as of 9/18/2018  Review Complete On: 9/18/2018 By: Shirley Wright RN No Known Allergies Current Immunizations  Never Reviewed No immunizations on file. Not reviewed this visit You Were Diagnosed With   
  
 Codes Comments Lumbar facet arthropathy (HCC)    -  Primary ICD-10-CM: M46.96 
ICD-9-CM: 721.3 Lumbar spondylosis     ICD-10-CM: M47.816 ICD-9-CM: 721.3 Spinal stenosis of lumbar region without neurogenic claudication     ICD-10-CM: M48.061 
ICD-9-CM: 724.02 Vitals BP Pulse Temp Resp Height(growth percentile) Weight(growth percentile) 136/75 86 98.2 °F (36.8 °C) 19 5' 9\" (1.753 m) 285 lb 3.2 oz (129.4 kg) SpO2 BMI Smoking Status 96% 42.12 kg/m2 Never Smoker BMI and BSA Data Body Mass Index Body Surface Area  
 42.12 kg/m 2 2.51 m 2 Preferred Pharmacy Pharmacy Name Phone [facility] [address] [place] [address], [address] [place] [address] Rd [phone] Your Updated Medication List  
  
   
This list is accurate as of 9/18/18 10:20 AM.  Always use your most recent med list. amLODIPine 10 mg tablet Commonly known as:  Lizzy Blade Take 1 Tab by mouth daily. amoxicillin 500 mg Tab Take  by mouth as needed. atorvastatin 40 mg tablet Commonly known as:  LIPITOR Take  by mouth daily. BOTOX 200 unit injection Generic drug:  onabotulinumtoxinA  
  
 celecoxib 200 mg capsule Commonly known as:  CELEBREX Take 1 Cap by mouth daily. Indications: OSTEOARTHRITIS  
  
 cholecalciferol 1,000 unit Cap Commonly known as:  VITAMIN D3  
  
 CINNAMON 500 mg Cap Generic drug:  cinnamon bark Take  by mouth. diclofenac 1 % Gel Commonly known as:  VOLTAREN Apply  to affected area four (4) times daily. Apply to both knees qid as directed  
  
 doxycycline 100 mg capsule Commonly known as:  Rachele Sunitha Take 100 mg by mouth daily. DULoxetine 30 mg capsule Commonly known as:  CYMBALTA Take 30 mg by mouth daily. finasteride 5 mg tablet Commonly known as:  PROSCAR Take 1 Tab by mouth daily. gabapentin 300 mg capsule Commonly known as:  NEURONTIN  
2 in the morning and 2 in the evening as directed  Indications: NEUROPATHIC PAIN  
  
 meclizine 25 mg tablet Commonly known as:  ANTIVERT Take 1 Tab by mouth daily. Omeprazole delayed release 20 mg tablet Commonly known as:  PRILOSEC D/R Take 1 Tab by mouth daily. ONE-A-DAY MEN VITACRAVES 200 mcg Chew Generic drug:  multivit with min-folic acid Take  by mouth.  
  
 prazosin 5 mg capsule Commonly known as:  MINIPRESS Take  by mouth nightly. raNITIdine 150 mg tablet Commonly known as:  ZANTAC Take 1 Tab by mouth two (2) times a day. sildenafil citrate 100 mg tablet Commonly known as:  VIAGRA Take 1 Tab by mouth as needed. Indications: Erectile Dysfunction  
  
 tamsulosin 0.4 mg capsule Commonly known as:  FLOMAX Take 0.4 mg by mouth daily. traZODone 150 mg tablet Commonly known as:  Letcher Leila Take 150 mg by mouth nightly. Follow-up Instructions Return in about 4 weeks (around 10/16/2018) for shonda [facility] To-Do List   
 09/18/2018 Imaging:  MRI LUMB SPINE WO CONT   
  
 09/19/2018 9:00 AM  
  Appointment with [facility] MRI RM 1 at [address] [place] [address] ([phone]) GENERAL INSTRUCTIONS  Bring information (ID card) if you have any medically implanted devices. You will be required to lie still while the procedure is being performed. Remove any jewelry (including body piercing, hairpins) prior to MRI. If you have had a creatinine level drawn within the past 30 days, please bring most recent results to your appt. Bring any films, CD's, and reports related to your study with you on the day of your exam.  This only includes studies done outside of [address] [place] [address], [facility], [place], and [facility]. Bring a complete list of all medications you are currently taking to include prescriptions, over-the-counter meds, herbals, vitamins & any dietary supplements. If you were given medications for claustrophobia or anxiety, please arrange to have someone drive you to your appointment. QUESTIONS  Notify the MRI Department if you have any questions concerning your study. [place] - [phone] [address] [phone] [facility] SERVICES! Dear Nikki Alfonso: Thank you for requesting a [employer] account. Our records indicate that you already have an active [employer] account. You can access your account anytime at [email or web address] Did you know that you can access your hospital and ER discharge instructions at any time in [employer]?   You can also review all of your test results from your hospital stay or ER visit. Additional Information If you have questions, please visit the Frequently Asked Questions section of the [employer] website at [email or web address] Remember, [employer] [facility] [facility] is NOT to be used for urgent needs. For medical emergencies, dial 911. Now available from your iPhone and Android! Please provide this summary of care documentation to your next provider. Your primary care clinician is listed as [id number] [facility]. If you have any questions after today's visit, please call [phone].

## 2018-09-19 ENCOUNTER — HOSPITAL ENCOUNTER (OUTPATIENT)
Age: 63
Discharge: HOME | End: 2018-09-19
Attending: NURSE PRACTITIONER
Payer: MEDICARE

## 2018-09-19 DIAGNOSIS — M47.816 LUMBAR FACET ARTHROPATHY: ICD-10-CM

## 2018-09-19 DIAGNOSIS — M48.061 SPINAL STENOSIS OF LUMBAR REGION WITHOUT NEUROGENIC CLAUDICATION: ICD-10-CM

## 2018-09-19 DIAGNOSIS — M47.816 LUMBAR SPONDYLOSIS: ICD-10-CM

## 2018-09-19 PROCEDURE — 72148 MRI LUMBAR SPINE W/O DYE: CPT

## 2018-10-04 ENCOUNTER — HOSPITAL ENCOUNTER (OUTPATIENT)
Dept: LAB | Age: 63
Discharge: HOME | End: 2018-10-04
Payer: MEDICARE

## 2018-10-04 ENCOUNTER — OFFICE VISIT (OUTPATIENT)
Dept: FAMILY MEDICINE CLINIC | Age: 63
End: 2018-10-04

## 2018-10-04 VITALS
DIASTOLIC BLOOD PRESSURE: 68 MMHG | OXYGEN SATURATION: 95 % | HEIGHT: 69 IN | BODY MASS INDEX: 42.33 KG/M2 | WEIGHT: 285.8 LBS | RESPIRATION RATE: 10 BRPM | SYSTOLIC BLOOD PRESSURE: 128 MMHG | TEMPERATURE: 98.3 F | HEART RATE: 83 BPM

## 2018-10-04 DIAGNOSIS — Z23 ENCOUNTER FOR IMMUNIZATION: ICD-10-CM

## 2018-10-04 DIAGNOSIS — E55.9 VITAMIN D DEFICIENCY: ICD-10-CM

## 2018-10-04 DIAGNOSIS — I10 ESSENTIAL HYPERTENSION: Primary | ICD-10-CM

## 2018-10-04 DIAGNOSIS — I10 ESSENTIAL HYPERTENSION: ICD-10-CM

## 2018-10-04 LAB
ALBUMIN SERPL-MCNC: 3.4 G/DL (ref 3.4–5)
ALBUMIN/GLOB SERPL: 1 {RATIO} (ref 0.8–1.7)
ALP SERPL-CCNC: 131 U/L (ref 45–117)
ALT SERPL-CCNC: 44 U/L (ref 16–61)
ANION GAP SERPL CALC-SCNC: 10 MMOL/L (ref 3–18)
AST SERPL-CCNC: 30 U/L (ref 15–37)
BILIRUB SERPL-MCNC: 0.2 MG/DL (ref 0.2–1)
BUN SERPL-MCNC: 20 MG/DL (ref 7–18)
BUN/CREAT SERPL: 13 (ref 12–20)
CALCIUM SERPL-MCNC: 8.7 MG/DL (ref 8.5–10.1)
CHLORIDE SERPL-SCNC: 107 MMOL/L (ref 100–108)
CHOLEST SERPL-MCNC: 159 MG/DL
CO2 SERPL-SCNC: 25 MMOL/L (ref 21–32)
CREAT SERPL-MCNC: 1.56 MG/DL (ref 0.6–1.3)
GLOBULIN SER CALC-MCNC: 3.5 G/DL (ref 2–4)
GLUCOSE SERPL-MCNC: 101 MG/DL (ref 74–99)
HDLC SERPL-MCNC: 45 MG/DL (ref 40–60)
HDLC SERPL: 3.5 {RATIO} (ref 0–5)
LDLC SERPL CALC-MCNC: 69.6 MG/DL (ref 0–100)
LIPID PROFILE,FLP: ABNORMAL
POTASSIUM SERPL-SCNC: 4.1 MMOL/L (ref 3.5–5.5)
PROT SERPL-MCNC: 6.9 G/DL (ref 6.4–8.2)
SODIUM SERPL-SCNC: 142 MMOL/L (ref 136–145)
TRIGL SERPL-MCNC: 222 MG/DL (ref ?–150)
VLDLC SERPL CALC-MCNC: 44.4 MG/DL

## 2018-10-04 PROCEDURE — 80053 COMPREHEN METABOLIC PANEL: CPT | Performed by: FAMILY MEDICINE

## 2018-10-04 PROCEDURE — 82306 VITAMIN D 25 HYDROXY: CPT | Performed by: FAMILY MEDICINE

## 2018-10-04 PROCEDURE — 80061 LIPID PANEL: CPT | Performed by: FAMILY MEDICINE

## 2018-10-04 NOTE — PATIENT INSTRUCTIONS
Influenza (Flu) Vaccine (Inactivated or Recombinant): What You Need to Know  Why get vaccinated? Influenza (\"flu\") is a contagious disease that spreads around the [place] every winter, usually between October and May. Flu is caused by influenza viruses and is spread mainly by coughing, sneezing, and close contact. Anyone can get flu. Flu strikes suddenly and can last several days. Symptoms vary by age, but can include:  · Fever/chills. · Sore throat. · Muscle aches. · Fatigue. · Cough. · Headache. · Runny or stuffy nose. Flu can also lead to pneumonia and blood infections, and cause diarrhea and seizures in children. If you have a medical condition, such as heart or lung disease, flu can make it worse. Flu is more dangerous for some people. Infants and young children, people 72years of age and older, pregnant women, and people with certain health conditions or a weakened immune system are at greatest risk. Each year thousands of people in the [facility] die from flu, and many more are hospitalized. Flu vaccine can:  · Keep you from getting flu. · Make flu less severe if you do get it. · Keep you from spreading flu to your family and other people. Inactivated and recombinant flu vaccines  A dose of flu vaccine is recommended every flu season. Children 6 months through 6years of age may need two doses during the same flu season. Everyone else needs only one dose each flu season. Some inactivated flu vaccines contain a very small amount of a mercury-based preservative called thimerosal. Studies have not shown thimerosal in vaccines to be harmful, but flu vaccines that do not contain thimerosal are available. There is no live flu virus in flu shots. They cannot cause the flu. There are many flu viruses, and they are always changing. Each year a new flu vaccine is made to protect against three or four viruses that are likely to cause disease in the upcoming flu season.  But even when the vaccine doesn't exactly match these viruses, it may still provide some protection. Flu vaccine cannot prevent:  · Flu that is caused by a virus not covered by the vaccine. · Illnesses that look like flu but are not. Some people should not get this vaccine  Tell the person who is giving you the vaccine:  · If you have any severe (life-threatening) allergies. If you ever had a life-threatening allergic reaction after a dose of flu vaccine, or have a severe allergy to any part of this vaccine, you may be advised not to get vaccinated. Most, but not all, types of flu vaccine contain a small amount of egg protein. · If you ever had Guillain-Barré syndrome (also called GBS) Some people with a history of GBS should not get this vaccine. This should be discussed with your doctor. · If you are not feeling well. It is usually okay to get flu vaccine when you have a mild illness, but you might be asked to come back when you feel better. Risks of a vaccine reaction  With any medicine, including vaccines, there is a chance of reactions. These are usually mild and go away on their own, but serious reactions are also possible. Most people who get a flu shot do not have any problems with it. Minor problems following a flu shot include:  · Soreness, redness, or swelling where the shot was given  · Hoarseness  · Sore, red or itchy eyes  · Cough  · Fever  · Aches  · Headache  · Itching  · Fatigue  If these problems occur, they usually begin soon after the shot and last 1 or 2 days. More serious problems following a flu shot can include the following:  · There may be a small increased risk of Guillain-Barré Syndrome (GBS) after inactivated flu vaccine. This risk has been estimated at 1 or 2 additional cases per million people vaccinated. This is much lower than the risk of severe complications from flu, which can be prevented by flu vaccine.   · Sims Ratel children who get the flu shot along with pneumococcal vaccine (PCV13) and/or DTaP vaccine at the same time might be slightly more likely to have a seizure caused by fever. Ask your doctor for more information. Tell your doctor if a child who is getting flu vaccine has ever had a seizure  Problems that could happen after any injected vaccine:  · People sometimes faint after a medical procedure, including vaccination. Sitting or lying down for about 15 minutes can help prevent fainting, and injuries caused by a fall. Tell your doctor if you feel dizzy, or have vision changes or ringing in the ears. · Some people get severe pain in the shoulder and have difficulty moving the arm where a shot was given. This happens very rarely. · Any medication can cause a severe allergic reaction. Such reactions from a vaccine are very rare, estimated at about 1 in a million doses, and would happen within a few minutes to a few hours after the vaccination. As with any medicine, there is a very remote chance of a vaccine causing a serious injury or death. The safety of vaccines is always being monitored. For more information, visit: www.cdc.gov/vaccinesafety/. What if there is a serious reaction? What should I look for? · Look for anything that concerns you, such as signs of a severe allergic reaction, very high fever, or unusual behavior. Signs of a severe allergic reaction can include hives, swelling of the face and throat, difficulty breathing, a fast heartbeat, dizziness, and weakness - usually within a few minutes to a few hours after the vaccination. What should I do? · If you think it is a severe allergic reaction or other emergency that can't wait, call 9-1-1 and get the person to the nearest hospital. Otherwise, call your doctor. · Reactions should be reported to the \"Vaccine Adverse Event Reporting System\" (VAERS). Your doctor should file this report, or you can do it yourself through the VAERS website at www.vaers. [facility].gov, or by calling 7-732.300.7169.   [employer] does not give medical advice. The National Vaccine Injury Compensation Program  The National Vaccine Injury Compensation Program (VICP) is a federal program that was created to compensate people who may have been injured by certain vaccines. Persons who believe they may have been injured by a vaccine can learn about the program and about filing a claim by calling 7-963.773.8069 or visiting the [employer] website at www.[facility].gov/vaccinecompensation. There is a time limit to file a claim for compensation. How can I learn more? · Ask your healthcare provider. He or she can give you the vaccine package insert or suggest other sources of information. · Call your local or state health department. · Contact the Centers for Disease Control and Prevention (CDC):  ¨ Call 6-755.399.6487 (1-800-CDC-INFO) or  ¨ Visit CDC's website at www.cdc.gov/flu  Vaccine Information Statement  Inactivated Influenza Vaccine  8/7/2015)  42 MARNI Kimbrough [id number]-[id number]  Department of Health and Human Services  Centers for Disease Control and Prevention  Many Vaccine Information Statements are available in [place] sor[place] and other languages. See www.immunize.org/vis. Muchas hojas de información sobre vacunas están disponibles en español y en otros idiomas. Visite www.immunize.org/vis. Care instructions adapted under license by [employer] (which disclaims liability or warranty for this information). If you have questions about a medical condition or this instruction, always ask your healthcare professional. [place] [address] any warranty or liability for your use of this information. Body Mass Index: Care Instructions  Your Care Instructions    Body mass index (BMI) can help you see if your weight is raising your risk for health problems. It uses a formula to compare how much you weigh with how tall you are. · A BMI lower than 18.5 is considered underweight. · A BMI between 18.5 and 24.9 is considered healthy.   · A BMI between 25 and 29.9 is considered overweight. A BMI of 30 or higher is considered obese. If your BMI is in the normal range, it means that you have a lower risk for weight-related health problems. If your BMI is in the overweight or obese range, you may be at increased risk for weight-related health problems, such as high blood pressure, heart disease, stroke, arthritis or joint pain, and diabetes. If your BMI is in the underweight range, you may be at increased risk for health problems such as fatigue, lower protection (immunity) against illness, muscle loss, bone loss, hair loss, and hormone problems. BMI is just one measure of your risk for weight-related health problems. You may be at higher risk for health problems if you are not active, you eat an unhealthy diet, or you drink too much alcohol or use tobacco products. Follow-up care is a key part of your treatment and safety. Be sure to make and go to all appointments, and call your doctor if you are having problems. It's also a good idea to know your test results and keep a list of the medicines you take. How can you care for yourself at home? · Practice healthy eating habits. This includes eating plenty of fruits, vegetables, whole grains, lean protein, and low-fat dairy. · If your doctor recommends it, get more exercise. Walking is a good choice. Bit by bit, increase the amount you walk every day. Try for at least 30 minutes on most days of the week. · Do not smoke. Smoking can increase your risk for health problems. If you need help quitting, talk to your doctor about stop-smoking programs and medicines. These can increase your chances of quitting for good. · Limit alcohol to 2 drinks a day for men and 1 drink a day for women. Too much alcohol can cause health problems. If you have a BMI higher than 25  · Your doctor may do other tests to check your risk for weight-related health problems.  This may include measuring the distance around your waist. A waist measurement of more than 40 inches in men or 35 inches in women can increase the risk of weight-related health problems. · Talk with your doctor about steps you can take to stay healthy or improve your health. You may need to make lifestyle changes to lose weight and stay healthy, such as changing your diet and getting regular exercise. If you have a BMI lower than 18.5  · Your doctor may do other tests to check your risk for health problems. · Talk with your doctor about steps you can take to stay healthy or improve your health. You may need to make lifestyle changes to gain or maintain weight and stay healthy, such as getting more healthy foods in your diet and doing exercises to build muscle. Where can you learn more? Go to http://[employer].info/. Enter S176 in the search box to learn more about \"Body Mass Index: Care Instructions. \"  Current as of: October 13, 2016  Content Version: 11.4  © 9589-1577 Healthwise, [employer]. Care instructions adapted under license by [employer] (which disclaims liability or warranty for this information). If you have questions about a medical condition or this instruction, always ask your healthcare professional. [address] any warranty or liability for your use of this information.
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
## 2018-10-04 NOTE — MR AVS SNAPSHOT
[address] [place] [address] Road 
[phone] Patient: Joss Momin MRN: [id number] CHRIS:[id number] Visit Information Date & Time Provider Department Dept. Phone Encounter #  
 10/4/2018  8:00 AM Evelina Bennett MD [facility] [phone] [id number] Follow-up Instructions Return in about 6 months (around 4/4/2019). Your Appointments 10/16/2018  8:30 AM  
SURGERY CONSULT with Meghan Colin MD  
[address] [facility], [address] and [facility] ONE [address]) Appt Note: REF E. BUTTERY , MRI WILL [facility] DISK  
 [address] [facility] [address] [phone]  
  
   
 [address] [facility] [address] Upcoming Health Maintenance Date Due Shingrix Vaccine Age 50> (1 of 2) 5/24/2005 Influenza Age 5 to Adult 8/1/2018 MEDICARE YEARLY EXAM 9/29/2018 COLONOSCOPY 10/7/2020 DTaP/Tdap/Td series (2 - Td) 6/29/2027 Allergies as of 10/4/2018  Review Complete On: 10/4/2018 By: Evelina Bennett MD  
 No Known Allergies Current Immunizations  Never Reviewed Name Date Influenza Vaccine (Quad) PF  Incomplete Not reviewed this visit You Were Diagnosed With   
  
 Codes Comments Essential hypertension    -  Primary ICD-10-CM: I10 
ICD-9-CM: 401.9 Vitamin D deficiency     ICD-10-CM: E55.9 ICD-9-CM: 268.9 Encounter for immunization     ICD-10-CM: J47 ICD-9-CM: V03.89 Vitals BP Pulse Temp Resp Height(growth percentile) Weight(growth percentile) 128/68 83 98.3 °F (36.8 °C) (Oral) 10 5' 9\" (1.753 m) 285 lb 12.8 oz (129.6 kg) SpO2 BMI Smoking Status 95% 42.21 kg/m2 Never Smoker BMI and BSA Data Body Mass Index Body Surface Area  
 42.21 kg/m 2 2.51 m 2 Preferred Pharmacy Pharmacy Name Phone [facility] [address] [place] [address], [address] [place] [address] Rd [phone] Your Updated Medication List  
  
   
This list is accurate as of 10/4/18  8:29 AM.  Always use your most recent med list. amLODIPine 10 mg tablet Commonly known as:  Pasty Pall Take 1 Tab by mouth daily. amoxicillin 500 mg Tab Take  by mouth as needed. atorvastatin 40 mg tablet Commonly known as:  LIPITOR Take  by mouth daily. BOTOX 200 unit injection Generic drug:  onabotulinumtoxinA  
  
 celecoxib 200 mg capsule Commonly known as:  CELEBREX Take 1 Cap by mouth daily. Indications: OSTEOARTHRITIS  
  
 cholecalciferol 1,000 unit Cap Commonly known as:  VITAMIN D3  
  
 CINNAMON 500 mg Cap Generic drug:  cinnamon bark Take  by mouth. diclofenac 1 % Gel Commonly known as:  VOLTAREN Apply  to affected area four (4) times daily. Apply to both knees qid as directed  
  
 doxycycline 100 mg capsule Commonly known as:  [place] Take 100 mg by mouth daily. DULoxetine 30 mg capsule Commonly known as:  CYMBALTA Take 30 mg by mouth daily. finasteride 5 mg tablet Commonly known as:  PROSCAR Take 1 Tab by mouth daily. gabapentin 300 mg capsule Commonly known as:  NEURONTIN  
2 in the morning and 2 in the evening as directed  Indications: NEUROPATHIC PAIN  
  
 meclizine 25 mg tablet Commonly known as:  ANTIVERT Take 1 Tab by mouth daily. Omeprazole delayed release 20 mg tablet Commonly known as:  PRILOSEC D/R Take 1 Tab by mouth daily. ONE-A-DAY MEN VITACRAVES 200 mcg Chew Generic drug:  multivit with min-folic acid Take  by mouth.  
  
 prazosin 5 mg capsule Commonly known as:  MINIPRESS Take  by mouth nightly. raNITIdine 150 mg tablet Commonly known as:  ZANTAC Take 1 Tab by mouth two (2) times a day. sildenafil citrate 100 mg tablet Commonly known as:  VIAGRA Take 1 Tab by mouth as needed. Indications: Erectile Dysfunction  
  
 tamsulosin 0.4 mg capsule Commonly known as:  FLOMAX Take 0.4 mg by mouth daily. traZODone 150 mg tablet Commonly known as:  Hector Henrique Take 150 mg by mouth nightly. We Performed the Following INFLUENZA VIRUS VAC QUAD,SPLIT,PRESV FREE SYRINGE IM [id number] CPT(R)] Follow-up Instructions Return in about 6 months (around 4/4/2019). To-Do List   
 10/04/2018 Lab:  LIPID PANEL   
  
 10/04/2018 Lab:  METABOLIC PANEL, COMPREHENSIVE   
  
 10/04/2018 Lab:  VITAMIN D, 25 HYDROXY Patient Instructions Influenza (Flu) Vaccine (Inactivated or Recombinant): What You Need to Know Why get vaccinated? Influenza (\"flu\") is a contagious disease that spreads around the [place] every winter, usually between October and May. Flu is caused by influenza viruses and is spread mainly by coughing, sneezing, and close contact. Anyone can get flu. Flu strikes suddenly and can last several days. Symptoms vary by age, but can include: · Fever/chills. · Sore throat. · Muscle aches. · Fatigue. · Cough. · Headache. · Runny or stuffy nose. Flu can also lead to pneumonia and blood infections, and cause diarrhea and seizures in children. If you have a medical condition, such as heart or lung disease, flu can make it worse. Flu is more dangerous for some people. Infants and young children, people 72years of age and older, pregnant women, and people with certain health conditions or a weakened immune system are at greatest risk. Each year thousands of people in the [facility] die from flu, and many more are hospitalized. Flu vaccine can: · Keep you from getting flu. · Make flu less severe if you do get it. · Keep you from spreading flu to your family and other people. Inactivated and recombinant flu vaccines A dose of flu vaccine is recommended every flu season.  Children 6 months through 6years of age may need two doses during the same flu season. Everyone else needs only one dose each flu season. Some inactivated flu vaccines contain a very small amount of a mercury-based preservative called thimerosal. Studies have not shown thimerosal in vaccines to be harmful, but flu vaccines that do not contain thimerosal are available. There is no live flu virus in flu shots. They cannot cause the flu. There are many flu viruses, and they are always changing. Each year a new flu vaccine is made to protect against three or four viruses that are likely to cause disease in the upcoming flu season. But even when the vaccine doesn't exactly match these viruses, it may still provide some protection. Flu vaccine cannot prevent: · Flu that is caused by a virus not covered by the vaccine. · Illnesses that look like flu but are not. Some people should not get this vaccine Tell the person who is giving you the vaccine: · If you have any severe (life-threatening) allergies. If you ever had a life-threatening allergic reaction after a dose of flu vaccine, or have a severe allergy to any part of this vaccine, you may be advised not to get vaccinated. Most, but not all, types of flu vaccine contain a small amount of egg protein. · If you ever had Guillain-Barré syndrome (also called GBS) Some people with a history of GBS should not get this vaccine. This should be discussed with your doctor. · If you are not feeling well. It is usually okay to get flu vaccine when you have a mild illness, but you might be asked to come back when you feel better. Risks of a vaccine reaction With any medicine, including vaccines, there is a chance of reactions. These are usually mild and go away on their own, but serious reactions are also possible. Most people who get a flu shot do not have any problems with it. Minor problems following a flu shot include: · Soreness, redness, or swelling where the shot was given · Hoarseness · Sore, red or itchy eyes · Cough · Fever · Aches · Headache · Itching · Fatigue If these problems occur, they usually begin soon after the shot and last 1 or 2 days. More serious problems following a flu shot can include the following: · There may be a small increased risk of Guillain-Barré Syndrome (GBS) after inactivated flu vaccine. This risk has been estimated at 1 or 2 additional cases per million people vaccinated. This is much lower than the risk of severe complications from flu, which can be prevented by flu vaccine. · Winda Dash children who get the flu shot along with pneumococcal vaccine (PCV13) and/or DTaP vaccine at the same time might be slightly more likely to have a seizure caused by fever. Ask your doctor for more information. Tell your doctor if a child who is getting flu vaccine has ever had a seizure Problems that could happen after any injected vaccine: · People sometimes faint after a medical procedure, including vaccination. Sitting or lying down for about 15 minutes can help prevent fainting, and injuries caused by a fall. Tell your doctor if you feel dizzy, or have vision changes or ringing in the ears. · Some people get severe pain in the shoulder and have difficulty moving the arm where a shot was given. This happens very rarely. · Any medication can cause a severe allergic reaction. Such reactions from a vaccine are very rare, estimated at about 1 in a million doses, and would happen within a few minutes to a few hours after the vaccination. As with any medicine, there is a very remote chance of a vaccine causing a serious injury or death. The safety of vaccines is always being monitored. For more information, visit: www.cdc.gov/vaccinesafety/. What if there is a serious reaction? What should I look for? · Look for anything that concerns you, such as signs of a severe allergic reaction, very high fever, or unusual behavior. Signs of a severe allergic reaction can include hives, swelling of the face and throat, difficulty breathing, a fast heartbeat, dizziness, and weakness  usually within a few minutes to a few hours after the vaccination. What should I do? · If you think it is a severe allergic reaction or other emergency that can't wait, call 9-1-1 and get the person to the nearest hospital. Otherwise, call your doctor. · Reactions should be reported to the \"Vaccine Adverse Event Reporting System\" (VAERS). Your doctor should file this report, or you can do it yourself through the VAERS website at www.vaers. [facility].gov, or by calling 2-183.716.5223. [employer] does not give medical advice. The National Vaccine Injury Compensation Program 
The National Vaccine Injury Compensation Program (VICP) is a federal program that was created to compensate people who may have been injured by certain vaccines. Persons who believe they may have been injured by a vaccine can learn about the program and about filing a claim by calling 3-188.359.6741 or visiting the [employer] website at www.[facility].gov/vaccinecompensation. There is a time limit to file a claim for compensation. How can I learn more? · Ask your healthcare provider. He or she can give you the vaccine package insert or suggest other sources of information. · Call your local or state health department. · Contact the Centers for Disease Control and Prevention (CDC): 
¨ Call 6-392.398.5138 (1-800-CDC-INFO) or ¨ Visit CDC's website at www.cdc.gov/flu Vaccine Information Statement Inactivated Influenza Vaccine 8/7/2015) [address] 230KL-51 Department of Health and [employer] Centers for Disease Control and Prevention Many Vaccine Information Statements are available in [place] and other languages. See www.immunize.org/vis. Muchas hojas de información sobre vacunas están disponibles en español y en otros idiomas. Visite www.immunize.org/vis. Care instructions adapted under license by [employer] (which disclaims liability or warranty for this information). If you have questions about a medical condition or this instruction, always ask your healthcare professional. [address]  any warranty or liability for your use of this information. Introducing [address]! Dear Lise Barclay: Thank you for requesting a [employer] account. Our records indicate that you already have an active [employer] account. You can access your account anytime at [email or web address] Did you know that you can access your hospital and ER discharge instructions at any time in [employer]? You can also review all of your test results from your hospital stay or ER visit. Additional Information If you have questions, please visit the Frequently Asked Questions section of the [employer] website at [email or web address] Remember, [employer] is NOT to be used for urgent needs. For medical emergencies, dial 911. Now available from your iPhone and Android! Please provide this summary of care documentation to your next provider. Your primary care clinician is listed as [address]. If you have any questions after today's visit, please call [phone].

## 2018-10-04 NOTE — PROGRESS NOTES
Patient here for 4 month f/u on his HTN. No concerns. 1. Have you been to the ER, urgent care clinic since your last visit? Hospitalized since your last visit? No  2. Have you seen or consulted any other health care providers outside of the [address] [place] [address] since your last visit? Include any pap smears or colon screening. No    Medication reconciliation has been completed with patient. Care team discussed/updated as well as pharmacy. Care everywhere has been ran. Health Maintenance reviewed - Patient will get his flu vaccine today, patient has been asked to set up [facility] to u .

## 2018-10-05 LAB — 25(OH)D3 SERPL-MCNC: 23.2 NG/ML (ref 30–100)

## 2018-10-16 ENCOUNTER — OFFICE VISIT (OUTPATIENT)
Dept: ORTHOPEDIC SURGERY | Age: 63
End: 2018-10-16

## 2018-10-16 ENCOUNTER — HOSPITAL ENCOUNTER (OUTPATIENT)
Dept: PREADMISSION TESTING | Age: 63
Discharge: HOME | End: 2018-10-16
Payer: MEDICARE

## 2018-10-16 VITALS
BODY MASS INDEX: 42.29 KG/M2 | OXYGEN SATURATION: 94 % | RESPIRATION RATE: 16 BRPM | WEIGHT: 286.4 LBS | SYSTOLIC BLOOD PRESSURE: 95 MMHG | HEART RATE: 94 BPM | DIASTOLIC BLOOD PRESSURE: 64 MMHG | TEMPERATURE: 98.5 F

## 2018-10-16 DIAGNOSIS — Z01.818 PRE-OP EXAM: ICD-10-CM

## 2018-10-16 DIAGNOSIS — M48.061 SPINAL STENOSIS OF LUMBAR REGION WITHOUT NEUROGENIC CLAUDICATION: Primary | ICD-10-CM

## 2018-10-16 LAB
ALBUMIN SERPL-MCNC: 3.6 G/DL (ref 3.4–5)
ALBUMIN/GLOB SERPL: 1.1 {RATIO} (ref 0.8–1.7)
ALP SERPL-CCNC: 135 U/L (ref 45–117)
ALT SERPL-CCNC: 27 U/L (ref 16–61)
ANION GAP SERPL CALC-SCNC: 6 MMOL/L (ref 3–18)
AST SERPL-CCNC: 18 U/L (ref 15–37)
BILIRUB SERPL-MCNC: 0.6 MG/DL (ref 0.2–1)
BUN SERPL-MCNC: 17 MG/DL (ref 7–18)
BUN/CREAT SERPL: 11 (ref 12–20)
CALCIUM SERPL-MCNC: 8.9 MG/DL (ref 8.5–10.1)
CHLORIDE SERPL-SCNC: 106 MMOL/L (ref 100–108)
CO2 SERPL-SCNC: 29 MMOL/L (ref 21–32)
CREAT SERPL-MCNC: 1.55 MG/DL (ref 0.6–1.3)
GLOBULIN SER CALC-MCNC: 3.4 G/DL (ref 2–4)
GLUCOSE SERPL-MCNC: 105 MG/DL (ref 74–99)
POTASSIUM SERPL-SCNC: 4.3 MMOL/L (ref 3.5–5.5)
PROT SERPL-MCNC: 7 G/DL (ref 6.4–8.2)
SODIUM SERPL-SCNC: 141 MMOL/L (ref 136–145)

## 2018-10-16 PROCEDURE — 80053 COMPREHEN METABOLIC PANEL: CPT | Performed by: ORTHOPAEDIC SURGERY

## 2018-10-16 PROCEDURE — 36415 COLL VENOUS BLD VENIPUNCTURE: CPT | Performed by: ORTHOPAEDIC SURGERY

## 2018-10-16 RX ORDER — DULOXETIN HYDROCHLORIDE 60 MG/1
60 CAPSULE, DELAYED RELEASE ORAL DAILY
Qty: 90 CAP | Refills: 0 | Status: SHIPPED | OUTPATIENT
Start: 2018-10-16 | End: 2019-03-08
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
## 2018-10-16 NOTE — PATIENT INSTRUCTIONS
Lumbar Spinal Stenosis: Care Instructions  Your Care Instructions    Stenosis in the spine is a narrowing of the canal that is around the spinal cord and nerve roots in your back. It can happen as part of aging. Sometimes bone and other tissue grow into this canal and press on the nerves that branch out from the spinal cord. This can cause pain, numbness, and weakness. When it happens in the lower part of your back, it is called lumbar spinal stenosis. It can cause problems in the legs, feet, and rear end (buttocks). You may be able to get relief from the symptoms of spinal stenosis by taking pain medicine. Your doctor may suggest physical therapy and exercises to keep your spine strong and flexible. Some people try steroid shots to reduce swelling. If pain and numbness in your legs are still so bad that you cannot do your normal activities, you may need surgery. Follow-up care is a key part of your treatment and safety. Be sure to make and go to all appointments, and call your doctor if you are having problems. It's also a good idea to know your test results and keep a list of the medicines you take. How can you care for yourself at home? · Take an over-the-counter pain medicine. Nonsteroidal anti-inflammatory drugs (NSAIDs) such as ibuprofen or naproxen seem to work best. But if you can't take NSAIDs, you can try acetaminophen. Be safe with medicines. Read and follow all instructions on the label. · Do not take two or more pain medicines at the same time unless the doctor told you to. Many pain medicines have acetaminophen, which is Tylenol. Too much acetaminophen (Tylenol) can be harmful. · Stay at a healthy weight. Being overweight puts extra strain on your spine. · Change positions often when you sit or stand. This can ease pain. It may also reduce pressure on the spinal cord and its nerves. · Avoid doing things that make your symptoms worse.  Walking downhill and standing for a long time may cause pain.  · Stretch and strengthen your back muscles as your doctor or physical therapist recommends. If your doctor says it is okay to do them, these exercises may help. ¨ Lie on your back with your knees bent. Gently pull one bent knee to your chest. Put that foot back on the floor, and then pull the other knee to your chest.  ¨ Do pelvic tilts. Lie on your back with your knees bent. Tighten your stomach muscles. Pull your belly button (navel) in and up toward your ribs. You should feel like your back is pressing to the floor and your hips and pelvis are slightly lifting off the floor. Hold for 6 seconds while breathing smoothly. ¨ Stand with your back flat against a wall. Slowly slide down until your knees are slightly bent. Hold for 10 seconds, then slide back up the wall. · Remove or change anything in your house that may cause you to fall. Keep walkways clear of clutter, electrical cords, and throw rugs. When should you call for help? Call 911 anytime you think you may need emergency care. For example, call if:    · You are unable to move a leg at all.   [facility] your doctor now or seek immediate medical care if:    · You have new or worse symptoms in your legs, belly, or buttocks. Symptoms may include:  ¨ Numbness or tingling. ¨ Weakness. ¨ Pain.     · You lose bladder or bowel control.    Watch closely for changes in your health, and be sure to contact your doctor if:    · You have a fever, lose weight, or don't feel well.     · You are not getting better as expected. Where can you learn more? Go to http://neel-stone.info/. Beverly Lies in the search box to learn more about \"Lumbar Spinal Stenosis: Care Instructions. \"  Current as of: November 29, 2017  Content Version: 11.8  © 0021-1943 [employer]. Care instructions adapted under license by [employer] (which disclaims liability or warranty for this information).  If you have questions about a medical condition or this instruction, always ask your healthcare professional. [place] [address] any warranty or liability for your use of this information. Learning About How to Have a Healthy Back  What causes back pain? Back pain is often caused by overuse, strain, or injury. For example, people often hurt their backs playing sports or working in the yard, being jolted in a car accident, or lifting something too heavy. Aging plays a part too. Your bones and muscles tend to lose strength as you age, which makes injury more likely. The spongy discs between the bones of the spine (vertebrae) may suffer from wear and tear and no longer provide enough cushion between the bones. A disc that bulges or breaks open (herniated disc) can press on nerves, causing back pain. In some people, back pain is the result of arthritis, broken vertebrae caused by bone loss (osteoporosis), illness, or a spine problem. Although most people have back pain at one time or another, there are steps you can take to make it less likely. How can you have a healthy back? Reduce stress on your back through good posture  Slumping or slouching alone may not cause low back pain. But after the back has been strained or injured, bad posture can make pain worse. · Sleep in a position that maintains your back's normal curves and on a mattress that feels comfortable. Sleep on your side with a pillow between your knees, or sleep on your back with a pillow under your knees. These positions can reduce strain on your back. · Stand and sit up straight. \"Good posture\" generally means your ears, shoulders, and hips are in a straight line. · If you must stand for a long time, put one foot on a stool, ledge, or box. Switch feet every now and then. · Sit in a chair that is low enough to let you place both feet flat on the floor with both knees nearly level with your hips. If your chair or desk is too high, use a footrest to raise your knees. Place a small pillow, a rolled-up towel, or a lumbar roll in the curve of your back if you need extra support. · Try a kneeling chair, which helps tilt your hips forward. This takes pressure off your lower back. · Try sitting on an exercise ball. It can rock from side to side, which helps keep your back loose. · When driving, keep your knees nearly level with your hips. Sit straight, and drive with both hands on the steering wheel. Your arms should be in a slightly bent position. Reduce stress on your back through careful lifting  · Squat down, bending at the hips and knees only. If you need to, put one knee to the floor and extend your other knee in front of you, bent at a right angle (half kneeling). · Press your chest straight forward. This helps keep your upper back straight while keeping a slight arch in your low back. · Hold the load as close to your body as possible, at the level of your belly button (navel). · Use your feet to change direction, taking small steps. · Lead with your hips as you change direction. Keep your shoulders in line with your hips as you move. · Set down your load carefully, squatting with your knees and hips only. Exercise and stretch your back  · Do some exercise on most days of the week, if your doctor says it is okay. You can walk, run, swim, or cycle. · Stretch your back muscles. Here are a few exercises to try:  [place] on your back, and gently pull one bent knee to your chest. Put that foot back on the floor, and then pull the other knee to your chest.  ¨ Do pelvic tilts. Lie on your back with your knees bent. Tighten your stomach muscles. Pull your belly button (navel) in and up toward your ribs. You should feel like your back is pressing to the floor and your hips and pelvis are slightly lifting off the floor. Hold for 6 seconds while breathing smoothly. ¨ Sit with your back flat against a wall. · Keep your core muscles strong.  The muscles of your back, belly (abdomen), and buttocks support your spine. ¨ Pull in your belly and imagine pulling your navel toward your spine. Hold this for 6 seconds, then relax. Remember to keep breathing normally as you tense your muscles. ¨ Do curl-ups. Always do them with your knees bent. Keep your low back on the floor, and curl your shoulders toward your knees using a smooth, slow motion. Keep your arms folded across your chest. If this bothers your neck, try putting your hands behind your neck (not your head), with your elbows spread apart. ¨ Lie on your back with your knees bent and your feet flat on the floor. Tighten your belly muscles, and then push with your feet and raise your buttocks up a few inches. Hold this position 6 seconds as you continue to breathe normally, then lower yourself slowly to the floor. Repeat 8 to 12 times. ¨ If you like group exercise, try Pilates or yoga. These classes have poses that strengthen the core muscles. Lead a healthy lifestyle  · Stay at a healthy weight to avoid strain on your back. · Do not smoke. Smoking increases the risk of osteoporosis, which weakens the spine. If you need help quitting, talk to your doctor about stop-smoking programs and medicines. These can increase your chances of quitting for good. Where can you learn more? Go to http://neel-stone.info/. Enter L315 in the search box to learn more about \"Learning About How to Have a Healthy Back. \"  Current as of: November 29, 2017  Content Version: 11.8  © 3967-7208 [employer]. Care instructions adapted under license by [employer] (which disclaims liability or warranty for this information). If you have questions about a medical condition or this instruction, always ask your healthcare professional. [address] any warranty or liability for your use of this information.
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
## 2018-10-16 NOTE — MR AVS SNAPSHOT
[facility] 
 
 
 [address] [facility] [address] 
[phone] Patient: Rita Gorman MRN: [id number] QHW:8/21/1288 Visit Information Date & Time Provider Department Dept. Phone Encounter #  
 10/16/2018  8:30 AM Aisha Sow  [facility], [address] [facility] [phone] [id number] Follow-up Instructions Return for Surgery. Follow-up and Disposition History Your Appointments 10/18/2018 10:15 AM  
ROUTINE CARE with Jose Angel Huffman MD  
[facility]) Appt Note: surgical Clearance [facility] [address] [facility] [address] [phone]  
  
   
 [address] [address]  
  
    
 11/5/2018  9:30 AM  
POST OP with Arash Disla NP  
[facility] ([facility]) Appt Note: sx 10-22  
 [address] [facility] [address]  
[phone]  
  
   
 [address] [id number]  
  
    
 12/4/2018  9:15 AM  
POST OP with Aisha Sow MD  
[facility]) Appt Note: 6wk fu sx 10-22  
 [address] [facility] [address]  
[phone]  
  
   
 [address]  
  
    
 4/4/2019  8:00 AM  
FOLLOW UP EXAM with Jose Angel Huffman MD  
[facility]) Appt Note: 6 month f/u  
 [address] [facility] [address] [facility]  
[phone]  
  
   
 [facility] [address] [place] [address] Upcoming Health Maintenance Date Due Shingrix Vaccine Age 50> (1 of 2) 5/24/2005 MEDICARE YEARLY EXAM 9/29/2018 COLONOSCOPY 10/7/2020 DTaP/Tdap/Td series (2 - Td) 6/29/2027 Allergies as of 10/16/2018  Review Complete On: 10/16/2018 By: Aisha Sow MD  
 No Known Allergies Current Immunizations  Never Reviewed Name Date Influenza Vaccine (Quad) PF 10/4/2018 Not reviewed this visit You Were Diagnosed With   
  
 Codes Comments Spinal stenosis of lumbar region without neurogenic claudication    -  Primary ICD-10-CM: M48.061 
ICD-9-CM: 724.02 Vitals BP Pulse Temp Resp Weight(growth percentile) SpO2  
 95/64 (BP 1 Location: Left arm, BP Patient Position: Sitting) 94 98.5 °F (36.9 °C) (Oral) 16 286 lb 6.4 oz (129.9 kg) 94% BMI Smoking Status 42.29 kg/m2 Never Smoker BMI and BSA Data Body Mass Index Body Surface Area  
 42.29 kg/m 2 2.51 m 2 Preferred Pharmacy Pharmacy Name Phone [facility], [address] [place] [address] Rd [phone] Your Updated Medication List  
  
   
This list is accurate as of 10/16/18 10:11 AM.  Always use your most recent med list. amLODIPine 10 mg tablet Commonly known as:  Katgrupo Buzzard Take 1 Tab by mouth daily. amoxicillin 500 mg Tab Take  by mouth as needed. atorvastatin 40 mg tablet Commonly known as:  LIPITOR Take  by mouth daily. BOTOX 200 unit injection Generic drug:  onabotulinumtoxinA  
  
 celecoxib 200 mg capsule Commonly known as:  CELEBREX Take 1 Cap by mouth daily. Indications: OSTEOARTHRITIS  
  
 cholecalciferol 1,000 unit Cap Commonly known as:  VITAMIN D3  
  
 CINNAMON 500 mg Cap Generic drug:  cinnamon bark Take  by mouth. diclofenac 1 % Gel Commonly known as:  VOLTAREN Apply  to affected area four (4) times daily. Apply to both knees qid as directed  
  
 doxycycline 100 mg capsule Commonly known as:  Aminata Cellar Take 100 mg by mouth daily. DULoxetine 60 mg capsule Commonly known as:  CYMBALTA Take 1 Cap by mouth daily. finasteride 5 mg tablet Commonly known as:  PROSCAR Take 1 Tab by mouth daily. gabapentin 300 mg capsule Commonly known as:  NEURONTIN  
2 in the morning and 2 in the evening as directed  Indications: NEUROPATHIC PAIN  
  
 meclizine 25 mg tablet Commonly known as:  ANTIVERT Take 1 Tab by mouth daily. Omeprazole delayed release 20 mg tablet Commonly known as:  PRILOSEC D/R Take 1 Tab by mouth daily. ONE-A-DAY MEN VITACRAVES 200 mcg Chew Generic drug:  multivit with min-folic acid Take  by mouth.  
  
 prazosin 5 mg capsule Commonly known as:  MINIPRESS Take  by mouth nightly. raNITIdine 150 mg tablet Commonly known as:  ZANTAC Take 1 Tab by mouth two (2) times a day. sildenafil citrate 100 mg tablet Commonly known as:  VIAGRA Take 1 Tab by mouth as needed. Indications: Erectile Dysfunction  
  
 tamsulosin 0.4 mg capsule Commonly known as:  FLOMAX Take 0.4 mg by mouth daily. traZODone 150 mg tablet Commonly known as:  Jane Pennant Take 150 mg by mouth nightly. Prescriptions Sent to Pharmacy Refills DULoxetine (CYMBALTA) 60 mg capsule 0 Sig: Take 1 Cap by mouth daily. Class: Normal  
 Pharmacy: [address] [place] [address] #: [phone] Route: Oral  
  
We Performed the Following AMB POC XRAY, SPINE, LUMBOSACRAL; 4+ [id number] CPT(R)] Follow-up Instructions Return for Surgery. Patient Instructions Lumbar Spinal Stenosis: Care Instructions Your Care Instructions Stenosis in the spine is a narrowing of the canal that is around the spinal cord and nerve roots in your back. It can happen as part of aging. Sometimes bone and other tissue grow into this canal and press on the nerves that branch out from the spinal cord. This can cause pain, numbness, and weakness. When it happens in the lower part of your back, it is called lumbar spinal stenosis. It can cause problems in the legs, feet, and rear end (buttocks). You may be able to get relief from the symptoms of spinal stenosis by taking pain medicine. Your doctor may suggest physical therapy and exercises to keep your spine strong and flexible. Some people try steroid shots to reduce swelling. If pain and numbness in your legs are still so bad that you cannot do your normal activities, you may need surgery. Follow-up care is a key part of your treatment and safety. Be sure to make and go to all appointments, and call your doctor if you are having problems. It's also a good idea to know your test results and keep a list of the medicines you take. How can you care for yourself at home? · Take an over-the-counter pain medicine. Nonsteroidal anti-inflammatory drugs (NSAIDs) such as ibuprofen or naproxen seem to work best. But if you can't take NSAIDs, you can try acetaminophen. Be safe with medicines. Read and follow all instructions on the label. · Do not take two or more pain medicines at the same time unless the doctor told you to. Many pain medicines have acetaminophen, which is Tylenol. Too much acetaminophen (Tylenol) can be harmful. · Stay at a healthy weight. Being overweight puts extra strain on your spine. · Change positions often when you sit or stand. This can ease pain. It may also reduce pressure on the spinal cord and its nerves. · Avoid doing things that make your symptoms worse. Walking downhill and standing for a long time may cause pain. · Stretch and strengthen your back muscles as your doctor or physical therapist recommends. If your doctor says it is okay to do them, these exercises may help. ¨ Lie on your back with your knees bent. Gently pull one bent knee to your chest. Put that foot back on the floor, and then pull the other knee to your chest. 
¨ Do pelvic tilts. Lie on your back with your knees bent. Tighten your stomach muscles.  Pull your belly button (navel) in and up toward your ribs. You should feel like your back is pressing to the floor and your hips and pelvis are slightly lifting off the floor. Hold for 6 seconds while breathing smoothly. ¨ Stand with your back flat against a wall. Slowly slide down until your knees are slightly bent. Hold for 10 seconds, then slide back up the wall. · Remove or change anything in your house that may cause you to fall. Keep walkways clear of clutter, electrical cords, and throw rugs. When should you call for help? Call 911 anytime you think you may need emergency care. For example, call if: 
  · You are unable to move a leg at all.  
[facility] your doctor now or seek immediate medical care if: 
  · You have new or worse symptoms in your legs, belly, or buttocks. Symptoms may include: ¨ Numbness or tingling. ¨ Weakness. ¨ Pain.  
  · You lose bladder or bowel control.  
 Watch closely for changes in your health, and be sure to contact your doctor if: 
  · You have a fever, lose weight, or don't feel well.  
  · You are not getting better as expected. Where can you learn more? Go to http://neelSampalRxstone.info/. Shellie Curran in the search box to learn more about \"Lumbar Spinal Stenosis: Care Instructions. \" Current as of: November 29, 2017 Content Version: 11.8 © 4738-8602 [employer]. Care instructions adapted under license by [employer] (which disclaims liability or warranty for this information). If you have questions about a medical condition or this instruction, always ask your healthcare professional. [place] [address] any warranty or liability for your use of this information. Learning About How to Have a Healthy Back What causes back pain? Back pain is often caused by overuse, strain, or injury. For example, people often hurt their backs playing sports or working in the yard, being jolted in a car accident, or lifting something too heavy. Aging plays a part too. Your bones and muscles tend to lose strength as you age, which makes injury more likely. The spongy discs between the bones of the spine (vertebrae) may suffer from wear and tear and no longer provide enough cushion between the bones. A disc that bulges or breaks open (herniated disc) can press on nerves, causing back pain. In some people, back pain is the result of arthritis, broken vertebrae caused by bone loss (osteoporosis), illness, or a spine problem. Although most people have back pain at one time or another, there are steps you can take to make it less likely. How can you have a healthy back? Reduce stress on your back through good posture Slumping or slouching alone may not cause low back pain. But after the back has been strained or injured, bad posture can make pain worse. · Sleep in a position that maintains your back's normal curves and on a mattress that feels comfortable. Sleep on your side with a pillow between your knees, or sleep on your back with a pillow under your knees. These positions can reduce strain on your back. · Stand and sit up straight. \"Good posture\" generally means your ears, shoulders, and hips are in a straight line. · If you must stand for a long time, put one foot on a stool, ledge, or box. Switch feet every now and then. · Sit in a chair that is low enough to let you place both feet flat on the floor with both knees nearly level with your hips. If your chair or desk is too high, use a footrest to raise your knees. Place a small pillow, a rolled-up towel, or a lumbar roll in the curve of your back if you need extra support. · Try a kneeling chair, which helps tilt your hips forward. This takes pressure off your lower back. · Try sitting on an exercise ball. It can rock from side to side, which helps keep your back loose. · When driving, keep your knees nearly level with your hips.  Sit straight, and drive with both hands on the steering wheel. Your arms should be in a slightly bent position. Reduce stress on your back through careful lifting · Squat down, bending at the hips and knees only. If you need to, put one knee to the floor and extend your other knee in front of you, bent at a right angle (half kneeling). · Press your chest straight forward. This helps keep your upper back straight while keeping a slight arch in your low back. · Hold the load as close to your body as possible, at the level of your belly button (navel). · Use your feet to change direction, taking small steps. · Lead with your hips as you change direction. Keep your shoulders in line with your hips as you move. · Set down your load carefully, squatting with your knees and hips only. Exercise and stretch your back · Do some exercise on most days of the week, if your doctor says it is okay. You can walk, run, swim, or cycle. · Stretch your back muscles. Here are a few exercises to try: ¨ Lie on your back, and gently pull one bent knee to your chest. Put that foot back on the floor, and then pull the other knee to your chest. 
¨ Do pelvic tilts. Lie on your back with your knees bent. Tighten your stomach muscles. Pull your belly button (navel) in and up toward your ribs. You should feel like your back is pressing to the floor and your hips and pelvis are slightly lifting off the floor. Hold for 6 seconds while breathing smoothly. ¨ Sit with your back flat against a wall. · Keep your core muscles strong. The muscles of your back, belly (abdomen), and buttocks support your spine. ¨ Pull in your belly and imagine pulling your navel toward your spine. Hold this for 6 seconds, then relax. Remember to keep breathing normally as you tense your muscles. ¨ Do curl-ups. Always do them with your knees bent.  Keep your low back on the floor, and curl your shoulders toward your knees using a smooth, slow motion. Keep your arms folded across your chest. If this bothers your neck, try putting your hands behind your neck (not your head), with your elbows spread apart. ¨ Lie on your back with your knees bent and your feet flat on the floor. Tighten your belly muscles, and then push with your feet and raise your buttocks up a few inches. Hold this position 6 seconds as you continue to breathe normally, then lower yourself slowly to the floor. Repeat 8 to 12 times. ¨ If you like group exercise, try Pilates or yoga. These classes have poses that strengthen the core muscles. Lead a healthy lifestyle · Stay at a healthy weight to avoid strain on your back. · Do not smoke. Smoking increases the risk of osteoporosis, which weakens the spine. If you need help quitting, talk to your doctor about stop-smoking programs and medicines. These can increase your chances of quitting for good. Where can you learn more? Go to http://neel-stone.info/. Enter L315 in the search box to learn more about \"Learning About How to Have a Healthy Back. \" Current as of: November 29, 2017 Content Version: 11.8 © 1043-9630 Healthwise, Incorporated. Care instructions adapted under license by [employer] (which disclaims liability or warranty for this information). If you have questions about a medical condition or this instruction, always ask your healthcare professional. [address] any warranty or liability for your use of this information. Introducing [address]! Dear Padmini Velazquez: Thank you for requesting a [employer] account. Our records indicate that you already have an active [employer] account. You can access your account anytime at [email or web address] Did you know that you can access your hospital and ER discharge instructions at any time in [employer]? You can also review all of your test results from your hospital stay or ER visit. Additional Information If you have questions, please visit the Frequently Asked Questions section of the [employer] website at [email or web address] [email or web address]. [email or web address] Remember, TELiBrahma is NOT to be used for urgent needs. For medical emergencies, dial 911. Now available from your iPhone and Android! Please provide this summary of care documentation to your next provider. Your primary care clinician is listed as [id number] [facility]. If you have any questions after today's visit, please call [phone].
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
## 2018-10-18 ENCOUNTER — OFFICE VISIT (OUTPATIENT)
Dept: FAMILY MEDICINE CLINIC | Age: 63
End: 2018-10-18

## 2018-10-18 VITALS
HEART RATE: 78 BPM | RESPIRATION RATE: 12 BRPM | BODY MASS INDEX: 42.89 KG/M2 | DIASTOLIC BLOOD PRESSURE: 86 MMHG | TEMPERATURE: 98.5 F | WEIGHT: 289.6 LBS | OXYGEN SATURATION: 93 % | HEIGHT: 69 IN | SYSTOLIC BLOOD PRESSURE: 130 MMHG

## 2018-10-18 DIAGNOSIS — M19.90 ARTHRITIS: ICD-10-CM

## 2018-10-18 DIAGNOSIS — I10 ESSENTIAL HYPERTENSION: ICD-10-CM

## 2018-10-18 DIAGNOSIS — E55.9 VITAMIN D DEFICIENCY: ICD-10-CM

## 2018-10-18 DIAGNOSIS — Z01.818 PRE-OPERATIVE CLEARANCE: Primary | ICD-10-CM

## 2018-10-18 DIAGNOSIS — M51.37 DEGENERATIVE DISC DISEASE AT L5-S1 LEVEL: ICD-10-CM

## 2018-10-18 DIAGNOSIS — F43.10 PTSD (POST-TRAUMATIC STRESS DISORDER): ICD-10-CM

## 2018-10-18 NOTE — PATIENT INSTRUCTIONS
Back Pain: Care Instructions  Your Care Instructions    Back pain has many possible causes. It is often related to problems with muscles and ligaments of the back. It may also be related to problems with the nerves, discs, or bones of the back. Moving, lifting, standing, sitting, or sleeping in an awkward way can strain the back. Sometimes you don't notice the injury until later. Arthritis is another common cause of back pain. Although it may hurt a lot, back pain usually improves on its own within several weeks. Most people recover in 12 weeks or less. Using good home treatment and being careful not to stress your back can help you feel better sooner. Follow-up care is a key part of your treatment and safety. Be sure to make and go to all appointments, and call your doctor if you are having problems. It's also a good idea to know your test results and keep a list of the medicines you take. How can you care for yourself at home? · Sit or lie in positions that are most comfortable and reduce your pain. Try one of these positions when you lie down:  ? Lie on your back with your knees bent and supported by large pillows. ? Lie on the floor with your legs on the seat of a sofa or chair. ? Lie on your side with your knees and hips bent and a pillow between your legs. ? Lie on your stomach if it does not make pain worse. · Do not sit up in bed, and avoid soft couches and twisted positions. Bed rest can help relieve pain at first, but it delays healing. Avoid bed rest after the first day of back pain. · Change positions every 30 minutes. If you must sit for long periods of time, take breaks from sitting. Get up and walk around, or lie in a comfortable position. · Try using a heating pad on a low or medium setting for 15 to 20 minutes every 2 or 3 hours. Try a warm shower in place of one session with the heating pad. · You can also try an ice pack for 10 to 15 minutes every 2 to 3 hours.  Put a thin cloth between the ice pack and your skin. · Take pain medicines exactly as directed. ? If the doctor gave you a prescription medicine for pain, take it as prescribed. ? If you are not taking a prescription pain medicine, ask your doctor if you can take an over-the-counter medicine. · Take short walks several times a day. You can start with 5 to 10 minutes, 3 or 4 times a day, and work up to longer walks. Walk on level surfaces and avoid hills and stairs until your back is better. · Return to work and other activities as soon as you can. Continued rest without activity is usually not good for your back. · To prevent future back pain, do exercises to stretch and strengthen your back and stomach. Learn how to use good posture, safe lifting techniques, and proper body mechanics. When should you call for help? Call your doctor now or seek immediate medical care if:    · You have new or worsening numbness in your legs.     · You have new or worsening weakness in your legs. (This could make it hard to stand up.)     · You lose control of your bladder or bowels.    Watch closely for changes in your health, and be sure to contact your doctor if:    · You have a fever, lose weight, or don't feel well.     · You do not get better as expected. Where can you learn more? Go to http://neel-stone.info/. Enter C207 in the search box to learn more about \"Back Pain: Care Instructions. \"  Current as of: November 29, 2017  Content Version: 11.8  © 4037-5662 [email or web address]. Care instructions adapted under license by [employer] (which disclaims liability or warranty for this information). If you have questions about a medical condition or this instruction, always ask your healthcare professional. [place] [address] any warranty or liability for your use of this information.
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
## 2018-10-18 NOTE — PROGRESS NOTES
Patient here for pre op clearance to have back surgery with Dr. Adrian Smart on Monday 10/22/18 at [facility] 10:15 AM. He will have procedure on his lower back. 1. Have you been to the ER, urgent care clinic since your last visit? Hospitalized since your last visit? No    2. Have you seen or consulted any other health care providers outside of the [address] [place] [address] since your last visit? Include any pap smears or colon screening. No    Medication reconciliation has been completed with patient. Care team discussed/updated as well as pharmacy. Care everywhere has been ran. Health Maintenance reviewed - Patient asked to set up his [address], will discuss need for Shingrix with provider.

## 2018-10-20 ENCOUNTER — ANESTHESIA EVENT (OUTPATIENT)
Dept: SURGERY | Age: 63
End: 2018-10-20
Payer: MEDICARE

## 2018-10-22 ENCOUNTER — ANESTHESIA (OUTPATIENT)
Dept: SURGERY | Age: 63
End: 2018-10-22
Payer: MEDICARE

## 2018-10-22 ENCOUNTER — HOSPITAL ENCOUNTER (OUTPATIENT)
Age: 63
Setting detail: OBSERVATION
Discharge: HOME | End: 2018-10-23
Attending: ORTHOPAEDIC SURGERY | Admitting: ORTHOPAEDIC SURGERY
Payer: MEDICARE

## 2018-10-22 ENCOUNTER — APPOINTMENT (OUTPATIENT)
Dept: GENERAL RADIOLOGY | Age: 63
End: 2018-10-22
Attending: ORTHOPAEDIC SURGERY
Payer: MEDICARE

## 2018-10-22 DIAGNOSIS — M48.061 SPINAL STENOSIS OF LUMBAR REGION WITHOUT NEUROGENIC CLAUDICATION: Primary | ICD-10-CM

## 2018-10-22 PROCEDURE — 74011250636 HC RX REV CODE- 250/636: Performed by: NURSE ANESTHETIST, CERTIFIED REGISTERED

## 2018-10-22 PROCEDURE — 51798 US URINE CAPACITY MEASURE: CPT

## 2018-10-22 PROCEDURE — 77030004402 HC BUR NEUR STRY -C: Performed by: ORTHOPAEDIC SURGERY

## 2018-10-22 PROCEDURE — 74011000250 HC RX REV CODE- 250: Performed by: ORTHOPAEDIC SURGERY

## 2018-10-22 PROCEDURE — 74011250636 HC RX REV CODE- 250/636: Performed by: ORTHOPAEDIC SURGERY

## 2018-10-22 PROCEDURE — G8978 MOBILITY CURRENT STATUS: HCPCS

## 2018-10-22 PROCEDURE — 74011250636 HC RX REV CODE- 250/636

## 2018-10-22 PROCEDURE — 74011000250 HC RX REV CODE- 250: Performed by: NURSE ANESTHETIST, CERTIFIED REGISTERED

## 2018-10-22 PROCEDURE — 77030008683 HC TU ET CUF COVD -A: Performed by: ANESTHESIOLOGY

## 2018-10-22 PROCEDURE — 77030018836 HC SOL IRR NACL ICUM -A: Performed by: ORTHOPAEDIC SURGERY

## 2018-10-22 PROCEDURE — 77030030163 HC BN WAX J&J -A: Performed by: ORTHOPAEDIC SURGERY

## 2018-10-22 PROCEDURE — 74011250637 HC RX REV CODE- 250/637: Performed by: ORTHOPAEDIC SURGERY

## 2018-10-22 PROCEDURE — 76010000153 HC OR TIME 1.5 TO 2 HR: Performed by: ORTHOPAEDIC SURGERY

## 2018-10-22 PROCEDURE — 77030020782 HC GWN BAIR PAWS FLX 3M -B: Performed by: ORTHOPAEDIC SURGERY

## 2018-10-22 PROCEDURE — 74011250636 HC RX REV CODE- 250/636: Performed by: NURSE PRACTITIONER

## 2018-10-22 PROCEDURE — G8979 MOBILITY GOAL STATUS: HCPCS

## 2018-10-22 PROCEDURE — 77030003029 HC SUT VCRL J&J -B: Performed by: ORTHOPAEDIC SURGERY

## 2018-10-22 PROCEDURE — 77030013079 HC BLNKT BAIR HGGR 3M -A: Performed by: ANESTHESIOLOGY

## 2018-10-22 PROCEDURE — 77030019908 HC STETH ESOPH SIMS -A: Performed by: ANESTHESIOLOGY

## 2018-10-22 PROCEDURE — 77030037134 HC WRAP COMPR BACK THER SOLM -B: Performed by: ORTHOPAEDIC SURGERY

## 2018-10-22 PROCEDURE — 77030012893: Performed by: ORTHOPAEDIC SURGERY

## 2018-10-22 PROCEDURE — 99218 HC RM OBSERVATION: CPT

## 2018-10-22 PROCEDURE — 77030027138 HC INCENT SPIROMETER -A

## 2018-10-22 PROCEDURE — 76060000034 HC ANESTHESIA 1.5 TO 2 HR: Performed by: ORTHOPAEDIC SURGERY

## 2018-10-22 PROCEDURE — 74011000250 HC RX REV CODE- 250

## 2018-10-22 PROCEDURE — 97161 PT EVAL LOW COMPLEX 20 MIN: CPT

## 2018-10-22 PROCEDURE — 97116 GAIT TRAINING THERAPY: CPT

## 2018-10-22 PROCEDURE — 76210000017 HC OR PH I REC 1.5 TO 2 HR: Performed by: ORTHOPAEDIC SURGERY

## 2018-10-22 PROCEDURE — 77030032490 HC SLV COMPR SCD KNE COVD -B: Performed by: ORTHOPAEDIC SURGERY

## 2018-10-22 PROCEDURE — 77030002933 HC SUT MCRYL J&J -A: Performed by: ORTHOPAEDIC SURGERY

## 2018-10-22 PROCEDURE — 74011000272 HC RX REV CODE- 272: Performed by: ORTHOPAEDIC SURGERY

## 2018-10-22 RX ORDER — NEOSTIGMINE METHYLSULFATE 1 MG/ML
INJECTION INTRAVENOUS AS NEEDED
Status: DISCONTINUED | OUTPATIENT
Start: 2018-10-22 | End: 2018-10-22 | Stop reason: HOSPADM

## 2018-10-22 RX ORDER — LORAZEPAM 2 MG/ML
1 INJECTION INTRAMUSCULAR
Status: DISCONTINUED | OUTPATIENT
Start: 2018-10-22 | End: 2018-10-23 | Stop reason: HOSPADM

## 2018-10-22 RX ORDER — DIPHENHYDRAMINE HYDROCHLORIDE 50 MG/ML
12.5 INJECTION, SOLUTION INTRAMUSCULAR; INTRAVENOUS
Status: DISCONTINUED | OUTPATIENT
Start: 2018-10-22 | End: 2018-10-23 | Stop reason: HOSPADM

## 2018-10-22 RX ORDER — VANCOMYCIN HYDROCHLORIDE 1 G/20ML
INJECTION, POWDER, LYOPHILIZED, FOR SOLUTION INTRAVENOUS AS NEEDED
Status: DISCONTINUED | OUTPATIENT
Start: 2018-10-22 | End: 2018-10-22 | Stop reason: HOSPADM

## 2018-10-22 RX ORDER — NALOXONE HYDROCHLORIDE 0.4 MG/ML
0.4 INJECTION, SOLUTION INTRAMUSCULAR; INTRAVENOUS; SUBCUTANEOUS AS NEEDED
Status: DISCONTINUED | OUTPATIENT
Start: 2018-10-22 | End: 2018-10-23 | Stop reason: HOSPADM

## 2018-10-22 RX ORDER — OXYCODONE HYDROCHLORIDE 5 MG/1
5-10 TABLET ORAL
Status: DISCONTINUED | OUTPATIENT
Start: 2018-10-22 | End: 2018-10-23 | Stop reason: HOSPADM

## 2018-10-22 RX ORDER — SODIUM CHLORIDE 0.9 % (FLUSH) 0.9 %
5-10 SYRINGE (ML) INJECTION AS NEEDED
Status: DISCONTINUED | OUTPATIENT
Start: 2018-10-22 | End: 2018-10-22 | Stop reason: HOSPADM

## 2018-10-22 RX ORDER — MIDAZOLAM HYDROCHLORIDE 1 MG/ML
INJECTION, SOLUTION INTRAMUSCULAR; INTRAVENOUS AS NEEDED
Status: DISCONTINUED | OUTPATIENT
Start: 2018-10-22 | End: 2018-10-22 | Stop reason: HOSPADM

## 2018-10-22 RX ORDER — DIAZEPAM 5 MG/1
5 TABLET ORAL
Status: DISCONTINUED | OUTPATIENT
Start: 2018-10-22 | End: 2018-10-23 | Stop reason: HOSPADM

## 2018-10-22 RX ORDER — ONDANSETRON 2 MG/ML
4 INJECTION INTRAMUSCULAR; INTRAVENOUS ONCE
Status: DISCONTINUED | OUTPATIENT
Start: 2018-10-22 | End: 2018-10-22 | Stop reason: HOSPADM

## 2018-10-22 RX ORDER — SUCCINYLCHOLINE CHLORIDE 20 MG/ML
INJECTION INTRAMUSCULAR; INTRAVENOUS AS NEEDED
Status: DISCONTINUED | OUTPATIENT
Start: 2018-10-22 | End: 2018-10-22 | Stop reason: HOSPADM

## 2018-10-22 RX ORDER — MORPHINE SULFATE 2 MG/ML
4 INJECTION, SOLUTION INTRAMUSCULAR; INTRAVENOUS
Status: DISCONTINUED | OUTPATIENT
Start: 2018-10-22 | End: 2018-10-22 | Stop reason: HOSPADM

## 2018-10-22 RX ORDER — PANTOPRAZOLE SODIUM 40 MG/1
40 TABLET, DELAYED RELEASE ORAL
Status: DISCONTINUED | OUTPATIENT
Start: 2018-10-22 | End: 2018-10-23 | Stop reason: HOSPADM

## 2018-10-22 RX ORDER — CEFAZOLIN SODIUM 2 G/50ML
2 SOLUTION INTRAVENOUS ONCE
Status: COMPLETED | OUTPATIENT
Start: 2018-10-22 | End: 2018-10-22

## 2018-10-22 RX ORDER — PROPOFOL 10 MG/ML
INJECTION, EMULSION INTRAVENOUS AS NEEDED
Status: DISCONTINUED | OUTPATIENT
Start: 2018-10-22 | End: 2018-10-22 | Stop reason: HOSPADM

## 2018-10-22 RX ORDER — SODIUM CHLORIDE 0.9 % (FLUSH) 0.9 %
5-10 SYRINGE (ML) INJECTION EVERY 8 HOURS
Status: DISCONTINUED | OUTPATIENT
Start: 2018-10-22 | End: 2018-10-22 | Stop reason: HOSPADM

## 2018-10-22 RX ORDER — BUPIVACAINE HYDROCHLORIDE 5 MG/ML
INJECTION, SOLUTION EPIDURAL; INTRACAUDAL AS NEEDED
Status: DISCONTINUED | OUTPATIENT
Start: 2018-10-22 | End: 2018-10-22 | Stop reason: HOSPADM

## 2018-10-22 RX ORDER — MECLIZINE HCL 12.5 MG 12.5 MG/1
25 TABLET ORAL DAILY
Status: DISCONTINUED | OUTPATIENT
Start: 2018-10-23 | End: 2018-10-23 | Stop reason: HOSPADM

## 2018-10-22 RX ORDER — GLYCOPYRROLATE 0.2 MG/ML
INJECTION INTRAMUSCULAR; INTRAVENOUS AS NEEDED
Status: DISCONTINUED | OUTPATIENT
Start: 2018-10-22 | End: 2018-10-22 | Stop reason: HOSPADM

## 2018-10-22 RX ORDER — FINASTERIDE 5 MG/1
5 TABLET, FILM COATED ORAL DAILY
Status: DISCONTINUED | OUTPATIENT
Start: 2018-10-23 | End: 2018-10-23 | Stop reason: HOSPADM

## 2018-10-22 RX ORDER — GABAPENTIN 300 MG/1
300 CAPSULE ORAL 3 TIMES DAILY
Status: DISCONTINUED | OUTPATIENT
Start: 2018-10-22 | End: 2018-10-23 | Stop reason: HOSPADM

## 2018-10-22 RX ORDER — PRAZOSIN HYDROCHLORIDE 5 MG/1
5 CAPSULE ORAL
Status: DISCONTINUED | OUTPATIENT
Start: 2018-10-22 | End: 2018-10-23 | Stop reason: HOSPADM

## 2018-10-22 RX ORDER — SODIUM CHLORIDE 0.9 % (FLUSH) 0.9 %
5-10 SYRINGE (ML) INJECTION AS NEEDED
Status: DISCONTINUED | OUTPATIENT
Start: 2018-10-22 | End: 2018-10-23 | Stop reason: HOSPADM

## 2018-10-22 RX ORDER — ONDANSETRON 2 MG/ML
INJECTION INTRAMUSCULAR; INTRAVENOUS AS NEEDED
Status: DISCONTINUED | OUTPATIENT
Start: 2018-10-22 | End: 2018-10-22 | Stop reason: HOSPADM

## 2018-10-22 RX ORDER — ATORVASTATIN CALCIUM 40 MG/1
40 TABLET, FILM COATED ORAL DAILY
Status: DISCONTINUED | OUTPATIENT
Start: 2018-10-23 | End: 2018-10-23 | Stop reason: HOSPADM

## 2018-10-22 RX ORDER — NALOXONE HYDROCHLORIDE 0.4 MG/ML
0.04 INJECTION, SOLUTION INTRAMUSCULAR; INTRAVENOUS; SUBCUTANEOUS AS NEEDED
Status: DISCONTINUED | OUTPATIENT
Start: 2018-10-22 | End: 2018-10-22 | Stop reason: HOSPADM

## 2018-10-22 RX ORDER — CEFAZOLIN SODIUM 2 G/50ML
2 SOLUTION INTRAVENOUS EVERY 8 HOURS
Status: DISCONTINUED | OUTPATIENT
Start: 2018-10-22 | End: 2018-10-23 | Stop reason: HOSPADM

## 2018-10-22 RX ORDER — ONDANSETRON 2 MG/ML
4 INJECTION INTRAMUSCULAR; INTRAVENOUS
Status: DISCONTINUED | OUTPATIENT
Start: 2018-10-22 | End: 2018-10-23 | Stop reason: HOSPADM

## 2018-10-22 RX ORDER — FAMOTIDINE 20 MG/1
20 TABLET, FILM COATED ORAL 2 TIMES DAILY
Status: DISCONTINUED | OUTPATIENT
Start: 2018-10-22 | End: 2018-10-23 | Stop reason: HOSPADM

## 2018-10-22 RX ORDER — AMLODIPINE BESYLATE 10 MG/1
10 TABLET ORAL DAILY
Status: DISCONTINUED | OUTPATIENT
Start: 2018-10-23 | End: 2018-10-23 | Stop reason: HOSPADM

## 2018-10-22 RX ORDER — SODIUM CHLORIDE 9 MG/ML
125 INJECTION, SOLUTION INTRAVENOUS CONTINUOUS
Status: DISCONTINUED | OUTPATIENT
Start: 2018-10-22 | End: 2018-10-23 | Stop reason: HOSPADM

## 2018-10-22 RX ORDER — MORPHINE SULFATE 4 MG/ML
1 INJECTION INTRAVENOUS
Status: DISCONTINUED | OUTPATIENT
Start: 2018-10-22 | End: 2018-10-23 | Stop reason: HOSPADM

## 2018-10-22 RX ORDER — ACETAMINOPHEN 500 MG
1000 TABLET ORAL EVERY 6 HOURS
Status: DISCONTINUED | OUTPATIENT
Start: 2018-10-22 | End: 2018-10-23 | Stop reason: HOSPADM

## 2018-10-22 RX ORDER — DIPHENHYDRAMINE HYDROCHLORIDE 50 MG/ML
12.5 INJECTION, SOLUTION INTRAMUSCULAR; INTRAVENOUS
Status: DISCONTINUED | OUTPATIENT
Start: 2018-10-22 | End: 2018-10-22 | Stop reason: HOSPADM

## 2018-10-22 RX ORDER — SODIUM CHLORIDE, SODIUM LACTATE, POTASSIUM CHLORIDE, CALCIUM CHLORIDE 600; 310; 30; 20 MG/100ML; MG/100ML; MG/100ML; MG/100ML
75 INJECTION, SOLUTION INTRAVENOUS CONTINUOUS
Status: DISCONTINUED | OUTPATIENT
Start: 2018-10-22 | End: 2018-10-22 | Stop reason: HOSPADM

## 2018-10-22 RX ORDER — DIPHENHYDRAMINE HCL 25 MG
25 CAPSULE ORAL
Status: DISCONTINUED | OUTPATIENT
Start: 2018-10-22 | End: 2018-10-23 | Stop reason: HOSPADM

## 2018-10-22 RX ORDER — ALBUTEROL SULFATE 0.83 MG/ML
2.5 SOLUTION RESPIRATORY (INHALATION) AS NEEDED
Status: DISCONTINUED | OUTPATIENT
Start: 2018-10-22 | End: 2018-10-22 | Stop reason: HOSPADM

## 2018-10-22 RX ORDER — LIDOCAINE HYDROCHLORIDE 20 MG/ML
INJECTION, SOLUTION EPIDURAL; INFILTRATION; INTRACAUDAL; PERINEURAL AS NEEDED
Status: DISCONTINUED | OUTPATIENT
Start: 2018-10-22 | End: 2018-10-22 | Stop reason: HOSPADM

## 2018-10-22 RX ORDER — SODIUM CHLORIDE, SODIUM LACTATE, POTASSIUM CHLORIDE, CALCIUM CHLORIDE 600; 310; 30; 20 MG/100ML; MG/100ML; MG/100ML; MG/100ML
50 INJECTION, SOLUTION INTRAVENOUS CONTINUOUS
Status: DISCONTINUED | OUTPATIENT
Start: 2018-10-22 | End: 2018-10-22 | Stop reason: HOSPADM

## 2018-10-22 RX ORDER — TAMSULOSIN HYDROCHLORIDE 0.4 MG/1
0.4 CAPSULE ORAL DAILY
Status: DISCONTINUED | OUTPATIENT
Start: 2018-10-23 | End: 2018-10-23 | Stop reason: HOSPADM

## 2018-10-22 RX ORDER — HYDROMORPHONE HYDROCHLORIDE 2 MG/ML
INJECTION, SOLUTION INTRAMUSCULAR; INTRAVENOUS; SUBCUTANEOUS AS NEEDED
Status: DISCONTINUED | OUTPATIENT
Start: 2018-10-22 | End: 2018-10-22 | Stop reason: HOSPADM

## 2018-10-22 RX ORDER — SODIUM CHLORIDE 0.9 % (FLUSH) 0.9 %
5-10 SYRINGE (ML) INJECTION EVERY 8 HOURS
Status: DISCONTINUED | OUTPATIENT
Start: 2018-10-22 | End: 2018-10-23 | Stop reason: HOSPADM

## 2018-10-22 RX ORDER — ROCURONIUM BROMIDE 10 MG/ML
INJECTION, SOLUTION INTRAVENOUS AS NEEDED
Status: DISCONTINUED | OUTPATIENT
Start: 2018-10-22 | End: 2018-10-22 | Stop reason: HOSPADM

## 2018-10-22 RX ORDER — CELECOXIB 100 MG/1
200 CAPSULE ORAL DAILY
Status: DISCONTINUED | OUTPATIENT
Start: 2018-10-23 | End: 2018-10-23 | Stop reason: HOSPADM

## 2018-10-22 RX ORDER — FENTANYL CITRATE 50 UG/ML
INJECTION, SOLUTION INTRAMUSCULAR; INTRAVENOUS AS NEEDED
Status: DISCONTINUED | OUTPATIENT
Start: 2018-10-22 | End: 2018-10-22 | Stop reason: HOSPADM

## 2018-10-22 RX ORDER — DEXAMETHASONE SODIUM PHOSPHATE 4 MG/ML
INJECTION, SOLUTION INTRA-ARTICULAR; INTRALESIONAL; INTRAMUSCULAR; INTRAVENOUS; SOFT TISSUE AS NEEDED
Status: DISCONTINUED | OUTPATIENT
Start: 2018-10-22 | End: 2018-10-22 | Stop reason: HOSPADM

## 2018-10-22 RX ADMIN — HYDROMORPHONE HYDROCHLORIDE 1 MG: 2 INJECTION, SOLUTION INTRAMUSCULAR; INTRAVENOUS; SUBCUTANEOUS at 11:28

## 2018-10-22 RX ADMIN — SODIUM CHLORIDE, SODIUM LACTATE, POTASSIUM CHLORIDE, AND CALCIUM CHLORIDE 75 ML/HR: 600; 310; 30; 20 INJECTION, SOLUTION INTRAVENOUS at 09:33

## 2018-10-22 RX ADMIN — SODIUM CHLORIDE, SODIUM LACTATE, POTASSIUM CHLORIDE, AND CALCIUM CHLORIDE 50 ML/HR: 600; 310; 30; 20 INJECTION, SOLUTION INTRAVENOUS at 13:09

## 2018-10-22 RX ADMIN — SODIUM CHLORIDE 125 ML/HR: 900 INJECTION, SOLUTION INTRAVENOUS at 17:04

## 2018-10-22 RX ADMIN — CEFAZOLIN SODIUM 2 G: 2 SOLUTION INTRAVENOUS at 21:25

## 2018-10-22 RX ADMIN — OXYCODONE HYDROCHLORIDE 10 MG: 5 TABLET ORAL at 18:02

## 2018-10-22 RX ADMIN — ROCURONIUM BROMIDE 35 MG: 10 INJECTION, SOLUTION INTRAVENOUS at 09:53

## 2018-10-22 RX ADMIN — FENTANYL CITRATE 50 MCG: 50 INJECTION, SOLUTION INTRAMUSCULAR; INTRAVENOUS at 11:28

## 2018-10-22 RX ADMIN — CEFAZOLIN SODIUM 2 G: 2 SOLUTION INTRAVENOUS at 18:03

## 2018-10-22 RX ADMIN — PRAZOSIN HYDROCHLORIDE 5 MG: 5 CAPSULE ORAL at 21:25

## 2018-10-22 RX ADMIN — ONDANSETRON 4 MG: 2 INJECTION INTRAMUSCULAR; INTRAVENOUS at 09:42

## 2018-10-22 RX ADMIN — GLYCOPYRROLATE 0.2 MG: 0.2 INJECTION INTRAMUSCULAR; INTRAVENOUS at 09:42

## 2018-10-22 RX ADMIN — ROCURONIUM BROMIDE 5 MG: 10 INJECTION, SOLUTION INTRAVENOUS at 09:47

## 2018-10-22 RX ADMIN — CEFAZOLIN SODIUM 2 G: 2 SOLUTION INTRAVENOUS at 09:42

## 2018-10-22 RX ADMIN — FENTANYL CITRATE 50 MCG: 50 INJECTION, SOLUTION INTRAMUSCULAR; INTRAVENOUS at 11:11

## 2018-10-22 RX ADMIN — GABAPENTIN 300 MG: 300 CAPSULE ORAL at 16:37

## 2018-10-22 RX ADMIN — MORPHINE SULFATE 1 MG: 4 INJECTION INTRAVENOUS at 15:35

## 2018-10-22 RX ADMIN — FAMOTIDINE 20 MG: 10 INJECTION, SOLUTION INTRAVENOUS at 09:32

## 2018-10-22 RX ADMIN — OXYCODONE HYDROCHLORIDE 10 MG: 5 TABLET ORAL at 23:43

## 2018-10-22 RX ADMIN — FENTANYL CITRATE 50 MCG: 50 INJECTION, SOLUTION INTRAMUSCULAR; INTRAVENOUS at 11:17

## 2018-10-22 RX ADMIN — PROPOFOL 200 MG: 10 INJECTION, EMULSION INTRAVENOUS at 09:47

## 2018-10-22 RX ADMIN — GLYCOPYRROLATE 0.6 MG: 0.2 INJECTION INTRAMUSCULAR; INTRAVENOUS at 11:22

## 2018-10-22 RX ADMIN — Medication 10 ML: at 16:39

## 2018-10-22 RX ADMIN — LIDOCAINE HYDROCHLORIDE 100 MG: 20 INJECTION, SOLUTION EPIDURAL; INFILTRATION; INTRACAUDAL; PERINEURAL at 09:47

## 2018-10-22 RX ADMIN — FENTANYL CITRATE 150 MCG: 50 INJECTION, SOLUTION INTRAMUSCULAR; INTRAVENOUS at 09:47

## 2018-10-22 RX ADMIN — MIDAZOLAM HYDROCHLORIDE 2 MG: 1 INJECTION, SOLUTION INTRAMUSCULAR; INTRAVENOUS at 09:42

## 2018-10-22 RX ADMIN — FAMOTIDINE 20 MG: 20 TABLET, FILM COATED ORAL at 18:03

## 2018-10-22 RX ADMIN — FENTANYL CITRATE 50 MCG: 50 INJECTION, SOLUTION INTRAMUSCULAR; INTRAVENOUS at 11:00

## 2018-10-22 RX ADMIN — ACETAMINOPHEN 1000 MG: 500 TABLET, FILM COATED ORAL at 16:37

## 2018-10-22 RX ADMIN — TRAZODONE HYDROCHLORIDE 150 MG: 100 TABLET ORAL at 21:25

## 2018-10-22 RX ADMIN — SUCCINYLCHOLINE CHLORIDE 140 MG: 20 INJECTION INTRAMUSCULAR; INTRAVENOUS at 09:47

## 2018-10-22 RX ADMIN — DEXAMETHASONE SODIUM PHOSPHATE 4 MG: 4 INJECTION, SOLUTION INTRA-ARTICULAR; INTRALESIONAL; INTRAMUSCULAR; INTRAVENOUS; SOFT TISSUE at 09:47

## 2018-10-22 RX ADMIN — PANTOPRAZOLE SODIUM 40 MG: 40 TABLET, DELAYED RELEASE ORAL at 16:37

## 2018-10-22 RX ADMIN — ACETAMINOPHEN 1000 MG: 500 TABLET, FILM COATED ORAL at 23:43

## 2018-10-22 RX ADMIN — HYDROMORPHONE HYDROCHLORIDE 1 MG: 2 INJECTION, SOLUTION INTRAMUSCULAR; INTRAVENOUS; SUBCUTANEOUS at 11:20

## 2018-10-22 RX ADMIN — GABAPENTIN 300 MG: 300 CAPSULE ORAL at 21:25

## 2018-10-22 RX ADMIN — NEOSTIGMINE METHYLSULFATE 4 MG: 1 INJECTION INTRAVENOUS at 11:22

## 2018-10-22 NOTE — BRIEF OP NOTE
BRIEF OPERATIVE NOTE    Date of Procedure: 10/22/2018   Preoperative Diagnosis: Spinal stenosis, lumbar region, with neurogenic claudication [M48.062]  Postoperative Diagnosis: Spinal stenosis, lumbar region, with neurogenic claudication [M48.062]    Procedure(s):  BILATERAL L4/5 LAMINECTOMY/C-ARM  Surgeon(s) and Role:     Latonia Ngo MD - Primary         Surgical Assistant: 0    Surgical Staff:  Circ-1: Noemy Willson RN  Radiology Technician: Cecilia Dumas  Scrub Tech-1: Cristi Reilly  Surg Asst-1: Rosalee Castellanos  Event Time In Time Out   Incision Start 1007    Incision Close       Anesthesia: General   Estimated Blood Loss: 25  Specimens: * No specimens in log *   Findings: stenosis   Complications: 0  Implants: * No implants in log *

## 2018-10-22 NOTE — OP NOTES
[facility] REPORT    Jesusita Salazar  MR#: [id number]  : 1955  ACCOUNT #: [de-identified]   DATE OF SERVICE: 10/22/2018    SURGEON:  Alli Worrell MD    PREOPERATIVE DIAGNOSIS:  Spinal stenosis L4-5. POSTOPERATIVE DIAGNOSIS:  Spinal stenosis L4-5. PROCEDURE PERFORMED:  L4-L5 bilateral hemilaminotomy, medial facetectomy. FINDINGS:  The patient had central lateral recess stenosis due to facet and ligamentum hypertrophy. Surgery made more difficult by morbid obesity. OPERATION:  Following induction of endotracheal anesthesia, the patient was turned in prone position on spinal frame. The patient was prepped and draped in usual fashion. Midline incision was made, a paramedian incision in limited fascia and subperiosteal dissection of L4-5 with C-arm  to surgical level. Hemilaminotomy was done with medial facetectomies and resection until ligament flavum. This thickened and partially calcified ligamentum flavum causing central lateral recess stenosis bilaterally. Decompression was done without having to destabilize the segments. No elements were free and mobile. Care was taken not to overly resect facet material to maintain segmental stability. Following the decompression, Gelfoam pledgets were placed over the laminotomy site to use a deep drain because of his obesity as concern for postoperative hematoma formation. There is no active bleeding. The lumbodorsal fascia was closed in the midline with #1 Vicryl, subcutaneous tissues closed with 2-0 Vicryl, skin closed with a 4-0 Monocryl subcuticular suture and Dermabond. A sterile dressing placed upon the wound. All counts were correct. ESTIMATED BLOOD LOSS:  10-25 mL. COMPLICATIONS:  No complications. SPECIMENS REMOVED:  None. IMPLANTS:  None.     ANESTHESIA:  General endotracheal.      Montse Purvis MD       [address]  D: 10/22/2018 11:13     T: 10/22/2018 14:11  JOB #: [id number]

## 2018-10-22 NOTE — PROGRESS NOTES
OR today   L4-L5 bilateral hemilaminotomy, medial facetectomy  VSS  ELVER = 0cc  Dressing to lumbar spine dry and intact  States has incisional pain so can't tell if back pain improved or not  Bilateral LE 45  Urine output = voided x 1, but only 50 cc, will increase IVF  Currently working with PT    Alina Clark, NP

## 2018-10-22 NOTE — ANESTHESIA PREPROCEDURE EVALUATION
Anesthetic History               Review of Systems / Medical History  Patient summary reviewed and pertinent labs reviewed    Pulmonary        Sleep apnea: No treatment           Neuro/Psych         Psychiatric history     Cardiovascular    Hypertension: well controlled              Exercise tolerance: >4 METS     GI/Hepatic/Renal                Endo/Other        Morbid obesity and arthritis     Other Findings              Physical Exam    Airway  Mallampati: II  TM Distance: 4 - 6 cm  Neck ROM: normal range of motion   Mouth opening: Normal     Cardiovascular    Rhythm: regular  Rate: normal         Dental  No notable dental hx       Pulmonary  Breath sounds clear to auscultation               Abdominal  GI exam deferred       Other Findings            Anesthetic Plan    ASA: 2  Anesthesia type: general          Induction: Intravenous  Anesthetic plan and risks discussed with: Patient

## 2018-10-22 NOTE — PROGRESS NOTES
Rounded on patient post spine surgery. Activity: Reinforced importance of getting OOB for all meals, going to bathroom to help prevent blood clots. VTE prophylaxis: Instructed patient to use their SCD's when not up and walking. To use while in bed and in the chair. Educated re: ankle pumps to assist with circulation when in hospital and at home. Medications: Reviewed pain medications patient is taking and the importance of keeping pain under control to help with getting OOB and therapy. Reminded the patient to always eat a snack with their pain medication to help to prevent nausea. Encouraged patient to monitor for constipation and to take a stool softner/laxative while recovering and on pain medication. Instructed not to go over 3 days without a bowel movement. Patient educated to stay on stool softener and or mild laxative while on narcotics along with drinking 8 glasses of water a day (unless on a fluid restriction). Incentive Spirometry: Reinforced use of incentive spirometer with return demonstration by patient. 2500 ml x 3. Wound Care: Dressing to surgical site dry and intact. Patient instructed not to take dressing off at home. Patient educated to bathe off daily and can use dial soap. Patient instructed not to shower for 72 hours after surgery. Stressed importance of using a clean towel and washcloth daily. Reminded to put on clean clothes and night clothes daily. Instructed to call nursing staff while in the hospital if dressing coming loose or feels wet. Instructed to call Home health agency or Dr Brett Ochoa office for concerns about dressing at home. Ice Protocol: Ice gel pack in place per protocol. Patient Safety: Call light  and personal belongings in reach. . Patient and family reminded to call for help toget OOB or when leaving bathroom for safety.  Phone and other items also within reach per patient's request. Reviewed back safety:no bending, lifting, twisting and no lifting anything greater than 1/2 gallon of milk. Patient reminded to log roll to get OOB. Diet: Educated patient on the importance of eating three well balanced meals a day with protein to promote bone/muscle healing. Reminded patient to drink lots of fluids to protect kidneys from all the medications being taken currently with recovery. Patient given post op educational material to remind them to do all the things discussed to prevent post op complications and have a successful recovery. Patient and family verbalized understand of all information and education discussed. Patient and family given the opportunity for asking questions.

## 2018-10-22 NOTE — PROGRESS NOTES
conducted an initial consultation and Spiritual Assessment for [facility], who is a 61 y.o.,male. Patients Primary Language is: [place]. According to the patients EMR Anabaptist Affiliation is: No preference. The reason the Patient came to the hospital is:   Patient Active Problem List    Diagnosis Date Noted    Spinal stenosis of lumbar region 10/22/2018    Vitamin D deficiency 10/18/2018    Obesity, morbid ([address] 12/26/2017    Lumbar spondylosis 10/26/2017    Lumbar facet arthropathy 10/26/2017    Degenerative disc disease at L5-S1 level 10/26/2017    Spinal stenosis of lumbar region without neurogenic claudication 10/26/2017    Chronic pain syndrome 10/26/2017    ACP (advance care planning) 09/28/2017    Chronic bilateral low back pain without sciatica 06/29/2017    Arthritis 10/27/2016    Essential hypertension 10/27/2016    Gastroesophageal reflux disease without esophagitis 10/27/2016    PTSD (post-traumatic stress disorder) 10/27/2016    Benign prostatic hyperplasia without lower urinary tract symptoms 10/27/2016    Vertigo 10/27/2016        The  provided the following Interventions:  Initiated a relationship of care and support. Explored issues of nishant, belief, spirituality and Judaism/ritual needs while hospitalized. Listened empathically. Provided chaplaincy education. Provided information about Spiritual Care Services. Offered prayer and assurance of continued prayers on patient's behalf. Chart reviewed. The following outcomes where achieved:  Patient shared limited information about both their medical narrative and spiritual journey/beliefs.  confirmed Patient's Anabaptist Affiliation. Patient processed feeling about current hospitalization. Patient expressed gratitude for 's visit. Assessment:  Patient does not have any Judaism/cultural needs that will affect patients preferences in health care.   There are no spiritual or Denominational issues which require intervention at this time. Plan:  Chaplains will continue to follow and will provide pastoral care on an as needed/requested basis.  recommends bedside caregivers page  on duty if patient shows signs of acute spiritual or emotional distress.     [address]   [phone]
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
## 2018-10-22 NOTE — PROGRESS NOTES
Reason for Admission:   Spinal stenosis                   RRAT Score:     10                Plan for utilizing home health:  possibly                     Likelihood of Readmission:  low                         Transition of Care Plan:  Discharge plan is home. Met with pt and sister at bedside. Pt lives with spouse and was stated his spouse assisted him prior to this admission but was independent in ADLs and drove. Has not used HH in the past. Pt states he owns a walker, knee scooter, wheelchair, crutches and cane. Observation notice provided in writing to patient and/or caregiver as well as verbal explanation of the policy. Patients who are in outpatient status also receive the Observation notice. Family to transport home at d/c. [facility] signed for [address], -[phone]    Care Management Interventions  PCP Verified by CM:  Yes  Palliative Care Criteria Met (RRAT>21 & CHF Dx)?: No  Mode of Transport at Discharge: Self  Transition of Care Consult (CM Consult): Home Health, Discharge Planning  [address] [address]: Yes  MyChart Signup: No  Discharge Durable Medical Equipment: No  Physical Therapy Consult: Yes  Occupational Therapy Consult: Yes  Speech Therapy Consult: No  Current Support Network: Lives with Spouse  Confirm Follow Up Transport: Family  Plan discussed with Pt/Family/Caregiver: Yes  Freedom of Choice Offered: Yes  Discharge Location  Discharge Placement: Home

## 2018-10-22 NOTE — INTERVAL H&P NOTE
H&P Update:  Sharmila Rodriguez was seen and examined. History and physical has been reviewed. The patient has been examined.  There have been no significant clinical changes since the completion of the originally dated History and Physical.    Signed By: Rowan Knight MD     October 22, 2018 9:10 AM

## 2018-10-23 ENCOUNTER — HOME HEALTH ADMISSION (OUTPATIENT)
Dept: HOME HEALTH SERVICES | Facility: HOME HEALTH | Age: 63
End: 2018-10-23
Payer: MEDICARE

## 2018-10-23 VITALS
HEART RATE: 128 BPM | RESPIRATION RATE: 18 BRPM | HEIGHT: 69 IN | OXYGEN SATURATION: 94 % | TEMPERATURE: 97.1 F | WEIGHT: 278 LBS | SYSTOLIC BLOOD PRESSURE: 130 MMHG | BODY MASS INDEX: 41.18 KG/M2 | DIASTOLIC BLOOD PRESSURE: 62 MMHG

## 2018-10-23 PROCEDURE — 97116 GAIT TRAINING THERAPY: CPT

## 2018-10-23 PROCEDURE — 97535 SELF CARE MNGMENT TRAINING: CPT

## 2018-10-23 PROCEDURE — G8987 SELF CARE CURRENT STATUS: HCPCS

## 2018-10-23 PROCEDURE — 99218 HC RM OBSERVATION: CPT

## 2018-10-23 PROCEDURE — G8989 SELF CARE D/C STATUS: HCPCS

## 2018-10-23 PROCEDURE — 74011250637 HC RX REV CODE- 250/637: Performed by: ORTHOPAEDIC SURGERY

## 2018-10-23 PROCEDURE — 74011250636 HC RX REV CODE- 250/636: Performed by: ORTHOPAEDIC SURGERY

## 2018-10-23 PROCEDURE — 74011250636 HC RX REV CODE- 250/636: Performed by: NURSE PRACTITIONER

## 2018-10-23 PROCEDURE — 97165 OT EVAL LOW COMPLEX 30 MIN: CPT

## 2018-10-23 RX ORDER — OXYCODONE AND ACETAMINOPHEN 10; 325 MG/1; MG/1
1 TABLET ORAL
Qty: 20 TAB | Refills: 0 | Status: SHIPPED | OUTPATIENT
Start: 2018-10-23 | End: 2018-11-05 | Stop reason: SDUPTHER

## 2018-10-23 RX ADMIN — TAMSULOSIN HYDROCHLORIDE 0.4 MG: 0.4 CAPSULE ORAL at 09:01

## 2018-10-23 RX ADMIN — PANTOPRAZOLE SODIUM 40 MG: 40 TABLET, DELAYED RELEASE ORAL at 09:00

## 2018-10-23 RX ADMIN — MECLIZINE 25 MG: 12.5 TABLET ORAL at 09:01

## 2018-10-23 RX ADMIN — FAMOTIDINE 20 MG: 20 TABLET, FILM COATED ORAL at 09:01

## 2018-10-23 RX ADMIN — OXYCODONE HYDROCHLORIDE 10 MG: 5 TABLET ORAL at 10:36

## 2018-10-23 RX ADMIN — SODIUM CHLORIDE 125 ML/HR: 900 INJECTION, SOLUTION INTRAVENOUS at 02:00

## 2018-10-23 RX ADMIN — CEFAZOLIN SODIUM 2 G: 2 SOLUTION INTRAVENOUS at 05:41

## 2018-10-23 RX ADMIN — AMLODIPINE BESYLATE 10 MG: 10 TABLET ORAL at 09:00

## 2018-10-23 RX ADMIN — CELECOXIB 200 MG: 100 CAPSULE ORAL at 09:00

## 2018-10-23 RX ADMIN — OXYCODONE HYDROCHLORIDE 10 MG: 5 TABLET ORAL at 05:45

## 2018-10-23 RX ADMIN — FINASTERIDE 5 MG: 5 TABLET, FILM COATED ORAL at 09:00

## 2018-10-23 RX ADMIN — ATORVASTATIN CALCIUM 40 MG: 40 TABLET, FILM COATED ORAL at 09:00

## 2018-10-23 RX ADMIN — ACETAMINOPHEN 1000 MG: 500 TABLET, FILM COATED ORAL at 05:41

## 2018-10-23 RX ADMIN — DIAZEPAM 5 MG: 5 TABLET ORAL at 09:00

## 2018-10-23 RX ADMIN — GABAPENTIN 300 MG: 300 CAPSULE ORAL at 09:00

## 2018-10-23 NOTE — ROUTINE PROCESS
Report given to [employer], including SBAR, MAR, and Kardex. Patient alert and oriented, vitals within normal limits, no apparent distress.

## 2018-10-23 NOTE — PROGRESS NOTES
OT order received and chart reviewed. Patient evaluated by skilled OT and appropriate for discharge home with wife when medically stable/cleared by PT. Full note to follow.   Thank you for the referral.  Wilhelmina Boxer, MS OTR/L
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
## 2018-10-23 NOTE — PROGRESS NOTES
Problem: Mobility Impaired (Adult and Pediatric)  Goal: *Acute Goals and Plan of Care (Insert Text)  Physical Therapy Goals  Initiated 10/22/2018 and to be accomplished within 7 day(s)  1. Patient will move from supine to sit and sit to supine , scoot up and down and roll side to side in bed with supervision/set-up. 2.  Patient will transfer from bed to chair and chair to bed with supervision/set-up using the least restrictive device. 3.  Patient will perform sit to stand with supervision/set-up. 4.  Patient will ambulate with supervision/set-up for >150 feet with the least restrictive device. 5.  Patient will ascend/descend 1-3 stairs with 1 handrail(s) with minimal assistance/contact guard assist.   Outcome: Progressing Towards Goal  physical Therapy TREATMENT    Patient: Tonny Gallagher (58 y.o. male)  Date: 10/23/2018  Diagnosis: Spinal stenosis, lumbar region, with neurogenic claudication [M48.062] <principal problem not specified>  Procedure(s) (LRB):  BILATERAL L4/5 LAMINECTOMY/C-ARM (Bilateral) 1 Day Post-Op  Precautions: Fall, Back  Chart, physical therapy assessment, plan of care and goals were reviewed. ASSESSMENT:  Pt demonstrated increased independence with ambulation and standing transfers. Pt continues to require min A with supine <-> sit transfers to assist with LEs. Pt stood from the bed with CGA and increased time. Pt ambulated 300 feet with RW and supervision. Pt was left sitting edge of bed with needs in reach and nursing notified. Pt declined stair training as he has a ramp to enter his home and no steps inside. Progression toward goals:  [x]      Improving appropriately and progressing toward goals  []      Improving slowly and progressing toward goals  []      Not making progress toward goals and plan of care will be adjusted     PLAN:  Patient continues to benefit from skilled intervention to address the above impairments.   Continue treatment per established plan of care.  Discharge Recommendations:  Home Health  Further Equipment Recommendations for Discharge:  N/A- pt has a rollator at home     G-CODES:     Mobility  Current  CJ= 20-39%   Goal  CI= 1-19%. The severity rating is based on the Level of Assistance required for Functional Mobility and ADLs. SUBJECTIVE:   Patient stated Shiela Pronto is good except getting in and out of bed.     OBJECTIVE DATA SUMMARY:   Critical Behavior:  Neurologic State: Alert  Orientation Level: Oriented X4  Cognition: Appropriate decision making, Follows commands  Safety/Judgement: Awareness of environment, Fall prevention  Functional Mobility Training:  Bed Mobility:  Supine to Sit: Minimum assistance(Simultaneous filing. User may not have seen previous data.)  Sit to Supine: Minimum assistance(Simultaneous filing. User may not have seen previous data.)  Transfers:  Sit to Stand: Contact guard assistance  Stand to Sit: Stand-by assistance     Balance:  Sitting: Intact(Simultaneous filing. User may not have seen previous data.)  Standing: With support(Simultaneous filing. User may not have seen previous data.)  Standing - Static: Good  Standing - Dynamic : (fair+)  Ambulation/Gait Training:  Distance (ft): 300 Feet (ft)  Assistive Device: Walker, rolling  Ambulation - Level of Assistance: Supervision  Speed/Rocio: Slow  Pain:  Pt reports 2/10 pain or discomfort prior to treatment.    Pt reports 4/10 pain or discomfort post treatment. Activity Tolerance:   fair  Please refer to the flowsheet for vital signs taken during this treatment.   After treatment:   [] Patient left in no apparent distress sitting up in chair  [x] Patient left in no apparent distress sitting edge of bed  [x] Call bell left within reach  [x] Nursing notified that patient is cleared for d/c from a PT perspective  [] Caregiver present  [] Bed alarm activated    Educated patient on log roll technique    Yesenia Boss, PT   Time Calculation: 17 mins

## 2018-10-23 NOTE — ROUTINE PROCESS
Received report on patient. Patient arrived on unit via bed, alert and oriented, no apparent distress.

## 2018-10-23 NOTE — HOME CARE
Received [facility] referral; Discharge noted for today ,[facility] will follow for PT/ [place] protocol; pt states he already has Rollator,cane,W/C and toilet riser, PT ([employer] )was recently in working with pt,she states pt has a rollator that will be ok for him to use ,pt doesn't need a RW ordered ; [facility] will follow. ALPA CASILLAS.

## 2018-10-23 NOTE — PROGRESS NOTES
Rounded on patient post spine surgery. Activity: Reinforced importance of getting OOB for all meals, going to bathroom to help prevent blood clots. VTE prophylaxis: Instructed patient to use their SCD's when not up and walking. To use while in bed and in the chair. Educated re: ankle pumps to assist with circulation when in hospital and at home. Medications: Reviewed pain medications patient is taking and the importance of keeping pain under control to help with getting OOB and therapy. Reminded the patient to always eat a snack with their pain medication to help to prevent nausea. Encouraged patient to monitor for constipation and to take a stool softner/laxative while recovering and on pain medication. Instructed not to go over 3 days without a bowel movement. Patient educated to stay on stool softener and or mild laxative while on narcotics along with drinking 8 glasses of water a day (unless on a fluid restriction). Incentive Spirometry: Reinforced use of incentive spirometer with return demonstration by patient. 1750 ml x 3. Wound Care: Dressing to surgical site dry and intact with drain removed. Patient instructed not to take dressing off at home. Patient educated to bathe off daily and can use dial soap. Patient instructed not to shower for 72 hours after surgery. Stressed importance of using a clean towel and washcloth daily. Reminded to put on clean clothes and night clothes daily. Instructed to call nursing staff while in the hospital if dressing coming loose or feels wet. Instructed to call Home health agency or Dr Windy Randhawa office for concerns about dressing at home. Ice Protocol: Ice gel pack in place per protocol. Patient Safety: Call light  and personal belongings in reach. . Patient  reminded to call for help toget OOB or when leaving bathroom for safety.  Phone and other items also within reach per patient's request. Reviewed back safety:no bending, lifting, twisting and no lifting anything greater than 1/2 gallon of milk. Patient reminded to log roll to get OOB. Diet: Educated patient on the importance of eating three well balanced meals a day with protein to promote bone/muscle healing. Reminded patient to drink lots of fluids to protect kidneys from all the medications being taken currently with recovery. Patient given post op educational material to remind them to do all the things discussed to prevent post op complications and have a successful recovery. Patient  verbalized understand of all information and education discussed. Patient  given the opportunity for asking questions.

## 2018-10-23 NOTE — PROGRESS NOTES
D/C order noted for today. Pt has been accepted to [facility] agency. Met with pt and  and are agreeable to the transition plan today. Transport has been arranged through pt sister. Pt's discharge summary and [facility] orders have been forwarded to  [facility] agency.

## 2018-10-23 NOTE — DISCHARGE SUMMARY
Discharge  Summary     Patient: Aubry Spurling MRN: [id number]  SSN: [id number]    YOB: 1955  Age: 61 y.o.   Sex: male       Admit Date: 10/22/2018    Discharge Date: 10/23/2018      Admission Diagnoses: Spinal stenosis, lumbar region, with neurogenic claudication [M48.062]    Discharge Diagnoses:   Problem List as of 10/23/2018 Date Reviewed: 10/18/2018          Codes Class Noted - Resolved    Spinal stenosis of lumbar region ICD-10-CM: M48.061  ICD-9-CM: 724.02  10/22/2018 - Present        Vitamin D deficiency ICD-10-CM: E55.9  ICD-9-CM: 268.9  10/18/2018 - Present        Obesity, morbid ([address] ICD-10-CM: E66.01  ICD-9-CM: 278.01  12/26/2017 - Present        Lumbar spondylosis ICD-10-CM: M47.816  ICD-9-CM: 721.3  10/26/2017 - Present        Lumbar facet arthropathy ICD-10-CM: M47.816  ICD-9-CM: 721.3  10/26/2017 - Present        Degenerative disc disease at L5-S1 level ICD-10-CM: M51.36  ICD-9-CM: 722.52  10/26/2017 - Present        Spinal stenosis of lumbar region without neurogenic claudication ICD-10-CM: M48.061  ICD-9-CM: 724.02  10/26/2017 - Present        Chronic pain syndrome ICD-10-CM: G89.4  ICD-9-CM: 338.4  10/26/2017 - Present        ACP (advance care planning) ICD-10-CM: Z71.89  ICD-9-CM: V65.49  9/28/2017 - Present        Chronic bilateral low back pain without sciatica ICD-10-CM: M54.5, G89.29  ICD-9-CM: 724.2, 338.29  6/29/2017 - Present        Arthritis ICD-10-CM: M19.90  ICD-9-CM: 716.90  10/27/2016 - Present        Essential hypertension ICD-10-CM: I10  ICD-9-CM: 401.9  10/27/2016 - Present        Gastroesophageal reflux disease without esophagitis ICD-10-CM: K21.9  ICD-9-CM: 530.81  10/27/2016 - Present        PTSD (post-traumatic stress disorder) ICD-10-CM: F43.10  ICD-9-CM: 309.81  10/27/2016 - Present        Benign prostatic hyperplasia without lower urinary tract symptoms ICD-10-CM: N40.0  ICD-9-CM: 600.00  10/27/2016 - Present        Vertigo ICD-10-CM: R42  ICD-9-CM: 780.4  10/27/2016 - Present               Discharge Condition: Good    Procedure:  L4-L5 bilateral hemilaminotomy, medial facetectomy.           Hospital Course: Normal hospital course for this procedure. Tolerated surgical intervention well. Incision dry and intact. Ambulatory. Disposition: home    Discharge Medications:   Cannot display discharge medications since this patient is not currently admitted. Follow-up Appointments   Procedures    FOLLOW UP VISIT Appointment in: Two Weeks     Standing Status:   Standing     Number of Occurrences:   1     Order Specific Question:   Appointment in     Answer:    Two Weeks       Signed By: Elif Little NP     October 23, 2018

## 2018-10-23 NOTE — DISCHARGE INSTRUCTIONS
Lumbar Laminectomy: What to Expect at [address] can expect your back to feel stiff or sore after surgery. This should improve in the weeks after surgery. You may have trouble sitting or standing in one position for very long and may need pain medicine in the weeks after your surgery. Your doctor may advise you to work with a physical therapist to strengthen the muscles around your spine and trunk. You will need to learn how to lift, twist, and bend so that you do not put too much strain on your back. This care sheet gives you a general idea about how long it will take for you to recover. But each person recovers at a different pace. Follow the steps below to get better as quickly as possible. How can you care for yourself at home? Activity    · Rest when you feel tired. Getting enough sleep will help you recover.     · Try to walk each day. Start by walking a little more than you did the day before. Bit by bit, increase the amount you walk. Walking boosts blood flow and helps prevent pneumonia and constipation. Walking may also decrease your muscle soreness after surgery.     · If advised by your doctor, you may need to avoid lifting anything that would cause excessive strain on your back. This may include a child, heavy grocery bags and milk containers, a heavy briefcase or backpack, cat litter or dog food bags, or a vacuum .     · Avoid strenuous activities, such as bicycle riding, jogging, weight lifting, or aerobic exercise, until your doctor says it is okay.     · Do not drive for 2 to 4 weeks after your surgery or until your doctor says it is okay.     · Avoid riding in a car for more than 30 minutes at a time for 2 to 4 weeks after surgery. If you must ride in a car for a longer distance, stop often to walk and stretch your legs.     · Try to change your position about every 30 minutes while sitting or standing.  This will help decrease your back pain while you are healing.     · You will probably need to take 4 to 6 weeks off from work. It depends on the type of work you do and how you feel.     · You may have sex as soon as you feel able, but avoid positions that put stress on your back or cause pain. Diet    · You can eat your normal diet. If your stomach is upset, try bland, low-fat foods like plain rice, broiled chicken, toast, and yogurt.     · Drink plenty of fluids (unless your doctor tells you not to).     · You may notice that your bowel movements are not regular right after your surgery. This is common. Try to avoid constipation and straining with bowel movements. You may want to take a fiber supplement every day. If you have not had a bowel movement after a couple of days, ask your doctor about taking a mild laxative. Medicines    · Your doctor will tell you if and when you can restart your medicines. He or she will also give you instructions about taking any new medicines.     · If you take blood thinners, such as warfarin (Coumadin), clopidogrel (Plavix), or aspirin, be sure to talk to your doctor. He or she will tell you if and when to start taking those medicines again. Make sure that you understand exactly what your doctor wants you to do.     · Take pain medicines exactly as directed. ? If the doctor gave you a prescription medicine for pain, take it as prescribed. ? If you are not taking a prescription pain medicine, ask your doctor if you can take an over-the-counter medicine.     · If your doctor prescribed antibiotics, take them as directed. Do not stop taking them just because you feel better. You need to take the full course of antibiotics.     · If you think your pain medicine is making you sick to your stomach:  ? Take your medicine after meals (unless your doctor has told you not to). ? Ask your doctor for a different pain medicine.    Incision care    · If you have strips of tape on the cut (incision) the doctor made, leave the tape on for a week or until it falls off.     · Wash the area daily with warm, soapy water and pat it dry.     · Keep the area clean and dry. You may cover it with a gauze bandage if it weeps or rubs against clothing. Change the bandage every day. Exercise    · Do back exercises as instructed by your doctor.     · Your doctor may advise you to work with a physical therapist to improve the strength and flexibility of your back. Other instructions    · To reduce stiffness and help sore muscles, use a warm water bottle, a heating pad set on low, or a warm cloth on your back. Do not put heat right over the incision. Do not go to sleep with a heating pad on your skin. Follow-up care is a key part of your treatment and safety. Be sure to make and go to all appointments, and call your doctor if you are having problems. It's also a good idea to know your test results and keep a list of the medicines you take. When should you call for help? Call 911 anytime you think you may need emergency care. For example, call if:    · You passed out (lost consciousness).     · You have sudden chest pain and shortness of breath, or you cough up blood.     · You are unable to move a leg at all.   [facility] your doctor now or seek immediate medical care if:    · You have new or worse symptoms in your legs or buttocks. Symptoms may include:  ? Numbness or tingling. ? Weakness. ? Pain.     · You lose bladder or bowel control.     · You have loose stitches, or your incision comes open.     · You have blood or fluid draining from the incision.     · You have signs of infection, such as:  ? Increased pain, swelling, warmth, or redness. ? Pus draining from the incision. ? A fever. ? Red streaks leading from the incision.    Watch closely for changes in your health, and be sure to contact your doctor if:    · You do not have a bowel movement after taking a laxative.     · You are not getting better as expected. Where can you learn more?   Go to http://neel-stone.info/. Enter T670 in the search box to learn more about \"Lumbar Laminectomy: What to Expect at Home. \"  Current as of: 2017  Content Version: 11.8  © 1652-7360 [employer]. Care instructions adapted under license by [employer] (which disclaims liability or warranty for this information). If you have questions about a medical condition or this instruction, always ask your healthcare professional. [facility] any warranty or liability for your use of this information. [employer] Activation    Thank you for requesting access to [employer]. Please follow the instructions below to securely access and download your online medical record. [employer] allows you to send messages to your doctor, view your test results, renew your prescriptions, schedule appointments, and more. How Do I Sign Up? 1. In your internet browser, go to [email or web address]  2. Click on the First Time User? Click Here link in the Sign In box. You will be redirect to the New Member Sign Up page. 3. Enter your [employer] Access Code exactly as it appears below. You will not need to use this code after youve completed the sign-up process. If you do not sign up before the expiration date, you must request a new code. [employer] Access Code: Activation code not generated  Current [employer] Status: Active (This is the date your [employer] access code will )    4. Enter the last four digits of your Social Security Number (xxxx) and Date of Birth (mm/dd/yyyy) as indicated and click Submit. You will be taken to the next sign-up page. 5. Create a [employer] ID. This will be your [employer] login ID and cannot be changed, so think of one that is secure and easy to remember. 6. Create a [employer] password. You can change your password at any time. 7. Enter your Password Reset Question and Answer. This can be used at a later time if you forget your password.    8. Enter your e-mail address. You will receive e-mail notification when new information is available in [address]. 9. Click Sign Up. You can now view and download portions of your medical record. 10. Click the Download Summary menu link to download a portable copy of your medical information. Additional Information    If you have questions, please visit the Frequently Asked Questions section of the [employer] website at [email or web address] Remember, [employer] is NOT to be used for urgent needs. For medical emergencies, dial 911. Patient armband removed and shredded    DISCHARGE SUMMARY from Nurse    PATIENT INSTRUCTIONS:    After general anesthesia or intravenous sedation, for 24 hours or while taking prescription Narcotics:  · Limit your activities  · Do not drive and operate hazardous machinery  · Do not make important personal or business decisions  · Do  not drink alcoholic beverages  · If you have not urinated within 8 hours after discharge, please contact your surgeon on call. Report the following to your surgeon:  · Excessive pain, swelling, redness or odor of or around the surgical area  · Temperature over 100.5  · Nausea and vomiting lasting longer than 4 hours or if unable to take medications  · Any signs of decreased circulation or nerve impairment to extremity: change in color, persistent  numbness, tingling, coldness or increase pain  · Any questions    What to do at Home:  Recommended activity: No lifting, Driving, or Strenuous exercise for 4 weeks    If you experience any of the following symptoms nausea, vomiting, chest pain, shortness of breath , fever greater than 100.5, please follow up with Dr. Jayesh Singh. *  Please give a list of your current medications to your Primary Care Provider. *  Please update this list whenever your medications are discontinued, doses are      changed, or new medications (including over-the-counter products) are added.     *  Please carry medication information at all times in case of emergency situations. These are general instructions for a healthy lifestyle:    No smoking/ No tobacco products/ Avoid exposure to second hand smoke  Surgeon General's Warning:  Quitting smoking now greatly reduces serious risk to your health. Obesity, smoking, and sedentary lifestyle greatly increases your risk for illness    A healthy diet, regular physical exercise & weight monitoring are important for maintaining a healthy lifestyle    You may be retaining fluid if you have a history of heart failure or if you experience any of the following symptoms:  Weight gain of 3 pounds or more overnight or 5 pounds in a week, increased swelling in our hands or feet or shortness of breath while lying flat in bed. Please call your doctor as soon as you notice any of these symptoms; do not wait until your next office visit. Recognize signs and symptoms of STROKE:    F-face looks uneven    A-arms unable to move or move unevenly    S-speech slurred or non-existent    T-time-call 911 as soon as signs and symptoms begin-DO NOT go       Back to bed or wait to see if you get better-TIME IS BRAIN. Warning Signs of HEART ATTACK     Call 911 if you have these symptoms:   Chest discomfort. Most heart attacks involve discomfort in the center of the chest that lasts more than a few minutes, or that goes away and comes back. It can feel like uncomfortable pressure, squeezing, fullness, or pain.  Discomfort in other areas of the upper body. Symptoms can include pain or discomfort in one or both arms, the back, neck, jaw, or stomach.  Shortness of breath with or without chest discomfort.  Other signs may include breaking out in a cold sweat, nausea, or lightheadedness. Don't wait more than five minutes to call 911 - MINUTES MATTER! Fast action can save your life. Calling 911 is almost always the fastest way to get lifesaving treatment.  Emergency Medical Services staff can begin treatment when they arrive -- up to an hour sooner than if someone gets to the hospital by car. The discharge information has been reviewed with the patient. The patient verbalized understanding. Discharge medications reviewed with the patient and appropriate educational materials and side effects teaching were provided.   ___________________________________________________________________________________________________________________________________

## 2018-10-24 ENCOUNTER — HOME CARE VISIT (OUTPATIENT)
Dept: SCHEDULING | Facility: HOME HEALTH | Age: 63
End: 2018-10-24
Payer: MEDICARE

## 2018-10-24 ENCOUNTER — PATIENT OUTREACH (OUTPATIENT)
Dept: FAMILY MEDICINE CLINIC | Age: 63
End: 2018-10-24

## 2018-10-24 PROCEDURE — G0151 HHCP-SERV OF PT,EA 15 MIN: HCPCS

## 2018-10-24 PROCEDURE — 3331090001 HH PPS REVENUE CREDIT

## 2018-10-24 PROCEDURE — 3331090002 HH PPS REVENUE DEBIT

## 2018-10-24 PROCEDURE — 400013 HH SOC

## 2018-10-24 NOTE — PROGRESS NOTES
Hospital Discharge Follow-Up      Date/Time:  10/24/2018 9:41 AM    Patient had a scheduled admission to [facility] on 10/22/18 and was discharged on 10/23/18 for lumbar spinal stenosis. The physician discharge summary was available at the time of outreach. Patient was contacted within one business days of discharge. Top Challenges reviewed with the provider   Unable to complete full assessment d/t poor cell phone reception at patient's home         Method of communication with provider :staff message    Inpatient RRAT score: not calculated  Was this a readmission? no   Patient stated reason for the readmission: NA    Nurse Navigator (NN) contacted the family by telephone to perform post hospital discharge assessment. Verified name and  with family as identifiers. Provided introduction to self, and explanation of the Nurse Navigator role. Call to listed number. The patient answered then gave the phone to his niece, Braden, who stepped outside to obtain cell phone reception. Reception was extremely poor during the call. Per niece the patient's wife is hospitalized out of state so niece is in the home helping. Advised niece to review discharge instructions on written paperwork and niece agreed to review and contact the PCP or surgeon's office for questions related to their healthcare. Advised niece of patient's appointment with Dr. Simran Denis on 10/31/18 at [phone]. Niece stated a PT from [facility] had arrived in the home about 5 minutes prior to this call. NN provided contact information for future reference. Disease Specific:   N/A    Summary of patient's top problems:  1. pain  2.  Impaired ambulation      Home Health orders at discharge: [facility]: [facility]  Date of initial visit: 10/24/18    Durable Medical Equipment ordered/company: none  Durable Medical Equipment received: none    Barriers to care? unable to assess d/t very poor cell phone reception in patient's home    Advance Care Planning:   Does patient have an Advance Directive:  not on file     Medication(s):   New Medications at Discharge: oxycodone  Changed Medications at Discharge: none  Discontinued Medications at Discharge: none    Medication reconciliation was not performed at the time of the call d/t poor cell phone reception in the patient's home. Niece stated she will review the printed medication list and ensure patient is taking maintenance medications. Patient did  and is taking oxycodone. Referral to Pharm D needed: no     Current Outpatient Medications   Medication Sig    oxyCODONE-acetaminophen (PERCOCET)  mg per tablet Take 1 Tab by mouth every six (6) hours as needed for Pain. Max Daily Amount: 4 Tabs.  DULoxetine (CYMBALTA) 60 mg capsule Take 1 Cap by mouth daily.  cholecalciferol (VITAMIN D3) 1,000 unit cap daily.  gabapentin (NEURONTIN) 300 mg capsule 2 in the morning and 2 in the evening as directed  Indications: NEUROPATHIC PAIN    celecoxib (CELEBREX) 200 mg capsule Take 1 Cap by mouth daily. Indications: OSTEOARTHRITIS    atorvastatin (LIPITOR) 40 mg tablet Take  by mouth daily.  prazosin (MINIPRESS) 5 mg capsule Take  by mouth nightly.  diclofenac (VOLTAREN) 1 % gel Apply  to affected area four (4) times daily. Apply to both knees qid as directed    raNITIdine (ZANTAC) 150 mg tablet Take 1 Tab by mouth two (2) times a day.  doxycycline (MONODOX) 100 mg capsule Take 100 mg by mouth daily.  amLODIPine (NORVASC) 10 mg tablet Take 1 Tab by mouth daily.  amoxicillin 500 mg tab Take  by mouth as needed (prior to dental procedures).  cinnamon bark (CINNAMON) 500 mg cap Take  by mouth daily.  tamsulosin (FLOMAX) 0.4 mg capsule Take 0.4 mg by mouth daily.  traZODone (DESYREL) 150 mg tablet Take 150 mg by mouth nightly.  multivit with min-folic acid (ONE-A-DAY MEN VITACRAVES) 200 mcg chew Take  by mouth.     finasteride (PROSCAR) 5 mg tablet Take 1 Tab by mouth daily.    meclizine (ANTIVERT) 25 mg tablet Take 1 Tab by mouth daily.  Omeprazole delayed release (PRILOSEC D/R) 20 mg tablet Take 1 Tab by mouth daily.  sildenafil citrate (VIAGRA) 100 mg tablet Take 1 Tab by mouth as needed. Indications: Erectile Dysfunction     No current facility-administered medications for this visit. There are no discontinued medications. [facility] follow up appointment(s):   Future Appointments   Date Time Provider Missy Godoy   10/31/2018 10:30 AM MD Rhoda Pina   11/5/2018  9:30 AM Farhad Aviles  [address] 12/4/2018  9:15 AM Ludwig Salazar  [address] 4/4/2019  8:00 AM MD Rhoda Pina      Non-[facility] follow up appointment(s): none  [facility]:  n/a       Goals      Attends follow-up appointments as directed. Plan:  Monitor for attendance at apts and assist as needed to schedule       Understands red flags and activity instructions post discharge. Plan:  Review red flags and activity instructions    ActivityDiet  Instructions    Rest when you feel tired. Getting enough sleep will help you recover.   Try to walk each day. Start by walking a little more than you did the day before. Bit by bit, increase the amount you walk. Walking boosts blood flow and helps prevent pneumonia and constipation. Walking may also decrease your muscle soreness after surgery.   If advised by your doctor, you may need to avoid lifting anything that would cause excessive strain on your back. This may include a child, heavy grocery bags and milk containers, a heavy briefcase or backpack, cat litter or dog food bags, or a vacuum .   Avoid strenuous activities, such as bicycle riding, jogging, weight lifting, or aerobic exercise, until your doctor says it is okay.   Do not drive for 2 to 4 weeks after your surgery or until your doctor says it is okay.     Avoid riding in a car for more than 30 minutes at a time for 2 to 4 weeks after surgery. If you must ride in a car for a longer distance, stop often to walk and stretch your legs.   Try to change your position about every 30 minutes while sitting or standing. This will help decrease your back pain while you are healing.   You will probably need to take 4 to 6 weeks off from work. It depends on the type of work you do and how you feel.   You may have sex as soon as you feel able, but avoid positions that put stress on your back or cause pain. Incision Care  If you have strips of tape on the cut (incision) the doctor made, leave the tape on for a week or until it falls off.   Wash the area daily with warm, soapy water and pat it dry.   Keep the area clean and dry. You may cover it with a gauze bandage if it weeps or rubs against clothing. Change the bandage every day. Call 911 if you think you need emergency help. For example call if:  You passed out (lost consciousness).   You have sudden chest pain and shortness of breath, or you cough up blood. You are unable to move a leg at all. Call your doctor now if:    You have new or worse symptoms in your legs or buttocks. Symptoms may include:  ?  Numbness or tingling. ?  Weakness. ?  Pain.   You lose bladder or bowel control.   You have loose stitches, or your incision comes open.   You have blood or fluid draining from the incision.   You have signs of infection, such as:  ?  Increased pain, swelling, warmth, or redness. ?  Pus draining from the incision. ? A fever. ? Red streaks leading from the incision.

## 2018-10-25 ENCOUNTER — HOME CARE VISIT (OUTPATIENT)
Dept: HOME HEALTH SERVICES | Facility: HOME HEALTH | Age: 63
End: 2018-10-25
Payer: MEDICARE

## 2018-10-25 VITALS
TEMPERATURE: 97.5 F | HEART RATE: 83 BPM | OXYGEN SATURATION: 98 % | SYSTOLIC BLOOD PRESSURE: 118 MMHG | DIASTOLIC BLOOD PRESSURE: 64 MMHG

## 2018-10-25 PROCEDURE — 3331090002 HH PPS REVENUE DEBIT

## 2018-10-25 PROCEDURE — 3331090001 HH PPS REVENUE CREDIT

## 2018-10-25 PROCEDURE — G0157 HHC PT ASSISTANT EA 15: HCPCS

## 2018-10-25 PROCEDURE — A6255 ABSORPT DRG >16<=48 IN W/BDR: HCPCS

## 2018-10-26 PROCEDURE — 3331090001 HH PPS REVENUE CREDIT

## 2018-10-26 PROCEDURE — 3331090002 HH PPS REVENUE DEBIT

## 2018-10-27 PROCEDURE — 3331090001 HH PPS REVENUE CREDIT

## 2018-10-27 PROCEDURE — 3331090002 HH PPS REVENUE DEBIT

## 2018-10-28 PROCEDURE — 3331090002 HH PPS REVENUE DEBIT

## 2018-10-28 PROCEDURE — 3331090001 HH PPS REVENUE CREDIT

## 2018-10-29 ENCOUNTER — HOME CARE VISIT (OUTPATIENT)
Dept: SCHEDULING | Facility: HOME HEALTH | Age: 63
End: 2018-10-29
Payer: MEDICARE

## 2018-10-29 VITALS
DIASTOLIC BLOOD PRESSURE: 76 MMHG | TEMPERATURE: 96.2 F | HEART RATE: 92 BPM | OXYGEN SATURATION: 98 % | SYSTOLIC BLOOD PRESSURE: 124 MMHG

## 2018-10-29 PROCEDURE — 3331090002 HH PPS REVENUE DEBIT

## 2018-10-29 PROCEDURE — G0157 HHC PT ASSISTANT EA 15: HCPCS

## 2018-10-29 PROCEDURE — 3331090001 HH PPS REVENUE CREDIT

## 2018-10-30 PROCEDURE — 3331090001 HH PPS REVENUE CREDIT

## 2018-10-30 PROCEDURE — 3331090002 HH PPS REVENUE DEBIT

## 2018-10-31 ENCOUNTER — PATIENT OUTREACH (OUTPATIENT)
Dept: FAMILY MEDICINE CLINIC | Age: 63
End: 2018-10-31

## 2018-10-31 ENCOUNTER — HOME CARE VISIT (OUTPATIENT)
Dept: SCHEDULING | Facility: HOME HEALTH | Age: 63
End: 2018-10-31
Payer: MEDICARE

## 2018-10-31 ENCOUNTER — OFFICE VISIT (OUTPATIENT)
Dept: FAMILY MEDICINE CLINIC | Age: 63
End: 2018-10-31

## 2018-10-31 VITALS
HEIGHT: 69 IN | TEMPERATURE: 98.2 F | BODY MASS INDEX: 41.03 KG/M2 | HEART RATE: 91 BPM | DIASTOLIC BLOOD PRESSURE: 60 MMHG | SYSTOLIC BLOOD PRESSURE: 90 MMHG | RESPIRATION RATE: 18 BRPM | OXYGEN SATURATION: 97 % | WEIGHT: 277 LBS

## 2018-10-31 VITALS
SYSTOLIC BLOOD PRESSURE: 124 MMHG | TEMPERATURE: 98.1 F | DIASTOLIC BLOOD PRESSURE: 60 MMHG | HEART RATE: 79 BPM | OXYGEN SATURATION: 97 %

## 2018-10-31 DIAGNOSIS — G89.29 CHRONIC BILATERAL LOW BACK PAIN WITHOUT SCIATICA: Primary | ICD-10-CM

## 2018-10-31 DIAGNOSIS — M54.50 CHRONIC BILATERAL LOW BACK PAIN WITHOUT SCIATICA: Primary | ICD-10-CM

## 2018-10-31 DIAGNOSIS — M51.37 DEGENERATIVE DISC DISEASE AT L5-S1 LEVEL: ICD-10-CM

## 2018-10-31 PROCEDURE — 3331090002 HH PPS REVENUE DEBIT

## 2018-10-31 PROCEDURE — 3331090001 HH PPS REVENUE CREDIT

## 2018-10-31 PROCEDURE — G0157 HHC PT ASSISTANT EA 15: HCPCS

## 2018-10-31 NOTE — PATIENT INSTRUCTIONS
Back Pain: Care Instructions  Your Care Instructions    Back pain has many possible causes. It is often related to problems with muscles and ligaments of the back. It may also be related to problems with the nerves, discs, or bones of the back. Moving, lifting, standing, sitting, or sleeping in an awkward way can strain the back. Sometimes you don't notice the injury until later. Arthritis is another common cause of back pain. Although it may hurt a lot, back pain usually improves on its own within several weeks. Most people recover in 12 weeks or less. Using good home treatment and being careful not to stress your back can help you feel better sooner. Follow-up care is a key part of your treatment and safety. Be sure to make and go to all appointments, and call your doctor if you are having problems. It's also a good idea to know your test results and keep a list of the medicines you take. How can you care for yourself at home? · Sit or lie in positions that are most comfortable and reduce your pain. Try one of these positions when you lie down:  ? Lie on your back with your knees bent and supported by large pillows. ? Lie on the floor with your legs on the seat of a sofa or chair. ? Lie on your side with your knees and hips bent and a pillow between your legs. ? Lie on your stomach if it does not make pain worse. · Do not sit up in bed, and avoid soft couches and twisted positions. Bed rest can help relieve pain at first, but it delays healing. Avoid bed rest after the first day of back pain. · Change positions every 30 minutes. If you must sit for long periods of time, take breaks from sitting. Get up and walk around, or lie in a comfortable position. · Try using a heating pad on a low or medium setting for 15 to 20 minutes every 2 or 3 hours. Try a warm shower in place of one session with the heating pad. · You can also try an ice pack for 10 to 15 minutes every 2 to 3 hours.  Put a thin cloth between the ice pack and your skin. · Take pain medicines exactly as directed. ? If the doctor gave you a prescription medicine for pain, take it as prescribed. ? If you are not taking a prescription pain medicine, ask your doctor if you can take an over-the-counter medicine. · Take short walks several times a day. You can start with 5 to 10 minutes, 3 or 4 times a day, and work up to longer walks. Walk on level surfaces and avoid hills and stairs until your back is better. · Return to work and other activities as soon as you can. Continued rest without activity is usually not good for your back. · To prevent future back pain, do exercises to stretch and strengthen your back and stomach. Learn how to use good posture, safe lifting techniques, and proper body mechanics. When should you call for help? Call your doctor now or seek immediate medical care if:    · You have new or worsening numbness in your legs.     · You have new or worsening weakness in your legs. (This could make it hard to stand up.)     · You lose control of your bladder or bowels.    Watch closely for changes in your health, and be sure to contact your doctor if:    · You have a fever, lose weight, or don't feel well.     · You do not get better as expected. Where can you learn more? Go to http://neel-stone.info/. Enter K683 in the search box to learn more about \"Back Pain: Care Instructions. \"  Current as of: November 29, 2017  Content Version: 11.8  © 7732-2946 [employer]. Care instructions adapted under license by [employer] (which disclaims liability or warranty for this information). If you have questions about a medical condition or this instruction, always ask your healthcare professional. [place] [address] any warranty or liability for your use of this information.
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
## 2018-10-31 NOTE — PROGRESS NOTES
Hospital Discharge Follow-Up        Patient had a scheduled admission to [facility] on 10/22/18 and was discharged on 10/23/18 for lumbar spinal stenosis. The patient attended an appointment with Dr. Haresh Philip today, 10/31/18. Goals Addressed                 This Visit's Progress     Attends follow-up appointments as directed.    On track     Plan:  Monitor for attendance at apts and assist as needed to schedule    10/31/18-attended apt with Dr. Haresh Philip

## 2018-10-31 NOTE — PROGRESS NOTES
[facility] 61 y.o. male being seen for   Chief Complaint   Patient presents with   [address] in to [facility] 10/22/18  Discharged from [facility] 10/23/18  Was contacted by [facility] 10/24/18    1. Have you been to the ER, urgent care clinic since your last visit? Hospitalized since your last visit? No    2. Have you seen or consulted any other health care providers outside of the [address] [place] [address] since your last visit? Include any pap smears or colon screening.  No    Pharmacy has been verified  Care team has been verified/updated

## 2018-11-01 ENCOUNTER — HOME CARE VISIT (OUTPATIENT)
Dept: HOME HEALTH SERVICES | Facility: HOME HEALTH | Age: 63
End: 2018-11-01
Payer: MEDICARE

## 2018-11-01 VITALS
DIASTOLIC BLOOD PRESSURE: 76 MMHG | OXYGEN SATURATION: 98 % | HEART RATE: 77 BPM | SYSTOLIC BLOOD PRESSURE: 120 MMHG | TEMPERATURE: 96.8 F

## 2018-11-01 PROCEDURE — G0157 HHC PT ASSISTANT EA 15: HCPCS

## 2018-11-01 PROCEDURE — 3331090001 HH PPS REVENUE CREDIT

## 2018-11-01 PROCEDURE — 3331090002 HH PPS REVENUE DEBIT

## 2018-11-02 PROCEDURE — 3331090001 HH PPS REVENUE CREDIT

## 2018-11-02 PROCEDURE — 3331090002 HH PPS REVENUE DEBIT

## 2018-11-03 PROCEDURE — 3331090002 HH PPS REVENUE DEBIT

## 2018-11-03 PROCEDURE — 3331090001 HH PPS REVENUE CREDIT

## 2018-11-04 PROCEDURE — 3331090002 HH PPS REVENUE DEBIT

## 2018-11-04 PROCEDURE — 3331090001 HH PPS REVENUE CREDIT

## 2018-11-05 ENCOUNTER — HOME CARE VISIT (OUTPATIENT)
Dept: SCHEDULING | Facility: HOME HEALTH | Age: 63
End: 2018-11-05
Payer: MEDICARE

## 2018-11-05 ENCOUNTER — OFFICE VISIT (OUTPATIENT)
Dept: ORTHOPEDIC SURGERY | Age: 63
End: 2018-11-05

## 2018-11-05 VITALS
HEART RATE: 82 BPM | DIASTOLIC BLOOD PRESSURE: 76 MMHG | BODY MASS INDEX: 41.47 KG/M2 | SYSTOLIC BLOOD PRESSURE: 137 MMHG | WEIGHT: 280 LBS | HEIGHT: 69 IN

## 2018-11-05 VITALS
OXYGEN SATURATION: 97 % | SYSTOLIC BLOOD PRESSURE: 133 MMHG | DIASTOLIC BLOOD PRESSURE: 96 MMHG | HEART RATE: 87 BPM | TEMPERATURE: 95.7 F

## 2018-11-05 DIAGNOSIS — G89.29 CHRONIC BILATERAL LOW BACK PAIN WITHOUT SCIATICA: ICD-10-CM

## 2018-11-05 DIAGNOSIS — M54.50 CHRONIC BILATERAL LOW BACK PAIN WITHOUT SCIATICA: ICD-10-CM

## 2018-11-05 DIAGNOSIS — Z98.890 S/P LUMBAR LAMINECTOMY: Primary | ICD-10-CM

## 2018-11-05 DIAGNOSIS — M62.830 MUSCLE SPASM OF BACK: ICD-10-CM

## 2018-11-05 PROCEDURE — G0157 HHC PT ASSISTANT EA 15: HCPCS

## 2018-11-05 PROCEDURE — 3331090002 HH PPS REVENUE DEBIT

## 2018-11-05 PROCEDURE — 3331090001 HH PPS REVENUE CREDIT

## 2018-11-05 RX ORDER — CYCLOBENZAPRINE HCL 10 MG
10 TABLET ORAL
Qty: 30 TAB | Refills: 0 | Status: SHIPPED | OUTPATIENT
Start: 2018-11-05 | End: 2019-03-01

## 2018-11-05 RX ORDER — OXYCODONE AND ACETAMINOPHEN 10; 325 MG/1; MG/1
1 TABLET ORAL
Qty: 20 TAB | Refills: 0 | Status: SHIPPED | OUTPATIENT
Start: 2018-11-05 | End: 2019-03-01
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
## 2018-11-05 NOTE — PATIENT INSTRUCTIONS
Lumbar Laminectomy: What to Expect at [address] can expect your back to feel stiff or sore after surgery. This should improve in the weeks after surgery. You may have trouble sitting or standing in one position for very long and may need pain medicine in the weeks after your surgery. Your doctor may advise you to work with a physical therapist to strengthen the muscles around your spine and trunk. You will need to learn how to lift, twist, and bend so that you do not put too much strain on your back. This care sheet gives you a general idea about how long it will take for you to recover. But each person recovers at a different pace. Follow the steps below to get better as quickly as possible. How can you care for yourself at home? Activity    · Rest when you feel tired. Getting enough sleep will help you recover.     · Try to walk each day. Start by walking a little more than you did the day before. Bit by bit, increase the amount you walk. Walking boosts blood flow and helps prevent pneumonia and constipation. Walking may also decrease your muscle soreness after surgery.     · If advised by your doctor, you may need to avoid lifting anything that would cause excessive strain on your back. This may include a child, heavy grocery bags and milk containers, a heavy briefcase or backpack, cat litter or dog food bags, or a vacuum .     · Avoid strenuous activities, such as bicycle riding, jogging, weight lifting, or aerobic exercise, until your doctor says it is okay.     · Do not drive for 2 to 4 weeks after your surgery or until your doctor says it is okay.     · Avoid riding in a car for more than 30 minutes at a time for 2 to 4 weeks after surgery. If you must ride in a car for a longer distance, stop often to walk and stretch your legs.     · Try to change your position about every 30 minutes while sitting or standing.  This will help decrease your back pain while you are healing.     · You will probably need to take 4 to 6 weeks off from work. It depends on the type of work you do and how you feel.     · You may have sex as soon as you feel able, but avoid positions that put stress on your back or cause pain. Diet    · You can eat your normal diet. If your stomach is upset, try bland, low-fat foods like plain rice, broiled chicken, toast, and yogurt.     · Drink plenty of fluids (unless your doctor tells you not to).     · You may notice that your bowel movements are not regular right after your surgery. This is common. Try to avoid constipation and straining with bowel movements. You may want to take a fiber supplement every day. If you have not had a bowel movement after a couple of days, ask your doctor about taking a mild laxative. Medicines    · Your doctor will tell you if and when you can restart your medicines. He or she will also give you instructions about taking any new medicines.     · If you take blood thinners, such as warfarin (Coumadin), clopidogrel (Plavix), or aspirin, be sure to talk to your doctor. He or she will tell you if and when to start taking those medicines again. Make sure that you understand exactly what your doctor wants you to do.     · Take pain medicines exactly as directed. ? If the doctor gave you a prescription medicine for pain, take it as prescribed. ? If you are not taking a prescription pain medicine, ask your doctor if you can take an over-the-counter medicine.     · If your doctor prescribed antibiotics, take them as directed. Do not stop taking them just because you feel better. You need to take the full course of antibiotics.     · If you think your pain medicine is making you sick to your stomach:  ? Take your medicine after meals (unless your doctor has told you not to). ? Ask your doctor for a different pain medicine.    Incision care    · If you have strips of tape on the cut (incision) the doctor made, leave the tape on for a week or until it falls off.     · Wash the area daily with warm, soapy water and pat it dry.     · Keep the area clean and dry. You may cover it with a gauze bandage if it weeps or rubs against clothing. Change the bandage every day. Exercise    · Do back exercises as instructed by your doctor.     · Your doctor may advise you to work with a physical therapist to improve the strength and flexibility of your back. Other instructions    · To reduce stiffness and help sore muscles, use a warm water bottle, a heating pad set on low, or a warm cloth on your back. Do not put heat right over the incision. Do not go to sleep with a heating pad on your skin. Follow-up care is a key part of your treatment and safety. Be sure to make and go to all appointments, and call your doctor if you are having problems. It's also a good idea to know your test results and keep a list of the medicines you take. When should you call for help? Call 911 anytime you think you may need emergency care. For example, call if:    · You passed out (lost consciousness).     · You have sudden chest pain and shortness of breath, or you cough up blood.     · You are unable to move a leg at all.   [facility] your doctor now or seek immediate medical care if:    · You have new or worse symptoms in your legs or buttocks. Symptoms may include:  ? Numbness or tingling. ? Weakness. ? Pain.     · You lose bladder or bowel control.     · You have loose stitches, or your incision comes open.     · You have blood or fluid draining from the incision.     · You have signs of infection, such as:  ? Increased pain, swelling, warmth, or redness. ? Pus draining from the incision. ? A fever. ? Red streaks leading from the incision.    Watch closely for changes in your health, and be sure to contact your doctor if:    · You do not have a bowel movement after taking a laxative.     · You are not getting better as expected. Where can you learn more?   Go to http://neel-stone.info/. Enter Y795 in the search box to learn more about \"Lumbar Laminectomy: What to Expect at Home. \"  Current as of: November 29, 2017  Content Version: 11.8  © 2781-5605 [employer]. Care instructions adapted under license by [employer] (which disclaims liability or warranty for this information). If you have questions about a medical condition or this instruction, always ask your healthcare professional. [address] any warranty or liability for your use of this information.

## 2018-11-06 PROCEDURE — 3331090002 HH PPS REVENUE DEBIT

## 2018-11-06 PROCEDURE — 3331090001 HH PPS REVENUE CREDIT

## 2018-11-07 ENCOUNTER — HOME CARE VISIT (OUTPATIENT)
Dept: SCHEDULING | Facility: HOME HEALTH | Age: 63
End: 2018-11-07
Payer: MEDICARE

## 2018-11-07 ENCOUNTER — PATIENT OUTREACH (OUTPATIENT)
Dept: FAMILY MEDICINE CLINIC | Age: 63
End: 2018-11-07

## 2018-11-07 PROCEDURE — 3331090001 HH PPS REVENUE CREDIT

## 2018-11-07 PROCEDURE — G0151 HHCP-SERV OF PT,EA 15 MIN: HCPCS

## 2018-11-07 PROCEDURE — 3331090002 HH PPS REVENUE DEBIT

## 2018-11-07 NOTE — PROGRESS NOTES
Hospital Discharge Follow-Up        Patient had a scheduled admission to [facility] on 10/22/18 and was discharged on 10/23/18 for lumbar spinal stenosis. The patient attended an office visit with andrew Castillo, on 11/5/18. Per visit note patient is doing will with improved back pain after surgery. [facility] continues to provide physical therapy in the home. Goals Addressed                 This Visit's Progress     Attends follow-up appointments as directed.    On track     Plan:  Monitor for attendance at apts and assist as needed to schedule    10/31/18-attended apt with Dr. Danielle Arizmendi    11/5/18-attended apt with andrew Castillo

## 2018-11-12 ENCOUNTER — OFFICE VISIT (OUTPATIENT)
Dept: FAMILY MEDICINE CLINIC | Age: 63
End: 2018-11-12

## 2018-11-12 ENCOUNTER — PATIENT OUTREACH (OUTPATIENT)
Dept: FAMILY MEDICINE CLINIC | Age: 63
End: 2018-11-12

## 2018-11-12 VITALS
HEART RATE: 94 BPM | HEIGHT: 69 IN | BODY MASS INDEX: 41.32 KG/M2 | DIASTOLIC BLOOD PRESSURE: 80 MMHG | TEMPERATURE: 98.5 F | OXYGEN SATURATION: 96 % | WEIGHT: 279 LBS | SYSTOLIC BLOOD PRESSURE: 126 MMHG

## 2018-11-12 DIAGNOSIS — N28.9 RENAL INSUFFICIENCY: ICD-10-CM

## 2018-11-12 DIAGNOSIS — L74.9 SWEATING ABNORMALITY: ICD-10-CM

## 2018-11-12 DIAGNOSIS — K21.9 GASTROESOPHAGEAL REFLUX DISEASE WITHOUT ESOPHAGITIS: Primary | ICD-10-CM

## 2018-11-12 NOTE — PATIENT INSTRUCTIONS
Learning About How to Have a Healthy Back  What causes back pain? Back pain is often caused by overuse, strain, or injury. For example, people often hurt their backs playing sports or working in the yard, being jolted in a car accident, or lifting something too heavy. Aging plays a part too. Your bones and muscles tend to lose strength as you age, which makes injury more likely. The spongy discs between the bones of the spine (vertebrae) may suffer from wear and tear and no longer provide enough cushion between the bones. A disc that bulges or breaks open (herniated disc) can press on nerves, causing back pain. In some people, back pain is the result of arthritis, broken vertebrae caused by bone loss (osteoporosis), illness, or a spine problem. Although most people have back pain at one time or another, there are steps you can take to make it less likely. How can you have a healthy back? Reduce stress on your back through good posture  Slumping or slouching alone may not cause low back pain. But after the back has been strained or injured, bad posture can make pain worse. · Sleep in a position that maintains your back's normal curves and on a mattress that feels comfortable. Sleep on your side with a pillow between your knees, or sleep on your back with a pillow under your knees. These positions can reduce strain on your back. · Stand and sit up straight. \"Good posture\" generally means your ears, shoulders, and hips are in a straight line. · If you must stand for a long time, put one foot on a stool, ledge, or box. Switch feet every now and then. · Sit in a chair that is low enough to let you place both feet flat on the floor with both knees nearly level with your hips. If your chair or desk is too high, use a footrest to raise your knees. Place a small pillow, a rolled-up towel, or a lumbar roll in the curve of your back if you need extra support.   · Try a kneeling chair, which helps tilt your hips forward. This takes pressure off your lower back. · Try sitting on an exercise ball. It can rock from side to side, which helps keep your back loose. · When driving, keep your knees nearly level with your hips. Sit straight, and drive with both hands on the steering wheel. Your arms should be in a slightly bent position. Reduce stress on your back through careful lifting  · Squat down, bending at the hips and knees only. If you need to, put one knee to the floor and extend your other knee in front of you, bent at a right angle (half kneeling). · Press your chest straight forward. This helps keep your upper back straight while keeping a slight arch in your low back. · Hold the load as close to your body as possible, at the level of your belly button (navel). · Use your feet to change direction, taking small steps. · Lead with your hips as you change direction. Keep your shoulders in line with your hips as you move. · Set down your load carefully, squatting with your knees and hips only. Exercise and stretch your back  · Do some exercise on most days of the week, if your doctor says it is okay. You can walk, run, swim, or cycle. · Stretch your back muscles. Here are a few exercises to try:  ? Lie on your back, and gently pull one bent knee to your chest. Put that foot back on the floor, and then pull the other knee to your chest.  ? Do pelvic tilts. Lie on your back with your knees bent. Tighten your stomach muscles. Pull your belly button (navel) in and up toward your ribs. You should feel like your back is pressing to the floor and your hips and pelvis are slightly lifting off the floor. Hold for 6 seconds while breathing smoothly. ? Sit with your back flat against a wall. · Keep your core muscles strong. The muscles of your back, belly (abdomen), and buttocks support your spine. ? Pull in your belly and imagine pulling your navel toward your spine. Hold this for 6 seconds, then relax.  Remember to keep breathing normally as you tense your muscles. ? Do curl-ups. Always do them with your knees bent. Keep your low back on the floor, and curl your shoulders toward your knees using a smooth, slow motion. Keep your arms folded across your chest. If this bothers your neck, try putting your hands behind your neck (not your head), with your elbows spread apart. ? Lie on your back with your knees bent and your feet flat on the floor. Tighten your belly muscles, and then push with your feet and raise your buttocks up a few inches. Hold this position 6 seconds as you continue to breathe normally, then lower yourself slowly to the floor. Repeat 8 to 12 times. ? If you like group exercise, try Pilates or yoga. These classes have poses that strengthen the core muscles. Lead a healthy lifestyle  · Stay at a healthy weight to avoid strain on your back. · Do not smoke. Smoking increases the risk of osteoporosis, which weakens the spine. If you need help quitting, talk to your doctor about stop-smoking programs and medicines. These can increase your chances of quitting for good. Where can you learn more? Go to http://neel-stone.info/. Enter L315 in the search box to learn more about \"Learning About How to Have a Healthy Back. \"  Current as of: November 29, 2017  Content Version: 11.8  © 0502-1561 [employer]. Care instructions adapted under license by [employer] (which disclaims liability or warranty for this information). If you have questions about a medical condition or this instruction, always ask your healthcare professional. [place] [address] any warranty or liability for your use of this information.

## 2018-11-12 NOTE — PROGRESS NOTES
Patient here for 10 day follow up on his back pain which has gotten better. He c/o having night sweats that he has had for some time now but is becoming more frequent. 1. Have you been to the ER, urgent care clinic since your last visit? Hospitalized since your last visit? No  2. Have you seen or consulted any other health care providers outside of the [address] [place] [address] since your last visit? Include any pap smears or colon screening. No    Medication reconciliation has been completed with patient. Care team discussed/updated as well as pharmacy. Care everywhere has been ran. Health Maintenance reviewed - Patient asked to scheduled MWV. Ivy Flores
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
## 2018-11-12 NOTE — PROGRESS NOTES
Hospital Discharge Follow-Up        Patient had a scheduled admission to [facility] on 10/22/18 and was discharged on 10/23/18 for lumbar spinal stenosis. The patient attended an appointment today, 11/12/18, with Dr. Varun Arthur. No medication changes noted. Referred to nephrology. Goals Addressed                 This Visit's Progress     Attends follow-up appointments as directed.    On track     Plan:  Monitor for attendance at apts and assist as needed to schedule    10/31/18, 11/12/18-attended apt with Dr. Varun Arthur    11/5/18-attended apt with andrew Porter

## 2018-11-27 ENCOUNTER — PATIENT OUTREACH (OUTPATIENT)
Dept: FAMILY MEDICINE CLINIC | Age: 63
End: 2018-11-27

## 2018-11-27 NOTE — PROGRESS NOTES
Hospital Discharge Follow-Up        Patient had a scheduled admission to [facility] on 10/22/18 and was discharged on 10/23/18 for lumbar spinal stenosis. Patient has graduated from the Transitions of Care Coordination  program on 11/27/18. Patient's symptoms are stable at this time. Patient/family has the ability to self-manage. Care management goals have been completed at this time. No further nurse navigator follow up scheduled. Pt has nurse navigator's contact information for any further questions, concerns, or needs. Patients upcoming visits:    Future Appointments   Date Time Provider Missy Sami   12/4/2018  9:15 AM Alma Watson  [address]   12/24/2018  8:00 AM MD Rhoda Chambers   4/4/2019  8:00 AM Opal Gunn MD [address]                 This Visit's Progress     Attends follow-up appointments as directed. On track     Plan:  Monitor for attendance at apts and assist as needed to schedule    10/31/18, 11/12/18-attended apt with Dr. Avery Fuentes    11/5/18-attended apt with andrew Salinas red flags and activity instructions post discharge. On track     Plan:  Review red flags and activity instructions    ActivityDiet  Instructions    Rest when you feel tired. Getting enough sleep will help you recover.   Try to walk each day. Start by walking a little more than you did the day before. Bit by bit, increase the amount you walk. Walking boosts blood flow and helps prevent pneumonia and constipation. Walking may also decrease your muscle soreness after surgery.   If advised by your doctor, you may need to avoid lifting anything that would cause excessive strain on your back. This may include a child, heavy grocery bags and milk containers, a heavy briefcase or backpack, cat litter or dog food bags, or a vacuum .     Avoid strenuous activities, such as bicycle riding, jogging, weight lifting, or aerobic exercise, until your doctor says it is okay.   Do not drive for 2 to 4 weeks after your surgery or until your doctor says it is okay.   Avoid riding in a car for more than 30 minutes at a time for 2 to 4 weeks after surgery. If you must ride in a car for a longer distance, stop often to walk and stretch your legs.   Try to change your position about every 30 minutes while sitting or standing. This will help decrease your back pain while you are healing.   You will probably need to take 4 to 6 weeks off from work. It depends on the type of work you do and how you feel.   You may have sex as soon as you feel able, but avoid positions that put stress on your back or cause pain. Incision Care  If you have strips of tape on the cut (incision) the doctor made, leave the tape on for a week or until it falls off.   Wash the area daily with warm, soapy water and pat it dry.   Keep the area clean and dry. You may cover it with a gauze bandage if it weeps or rubs against clothing. Change the bandage every day. Call 911 if you think you need emergency help. For example call if:  You passed out (lost consciousness).   You have sudden chest pain and shortness of breath, or you cough up blood. You are unable to move a leg at all. Call your doctor now if:    You have new or worse symptoms in your legs or buttocks. Symptoms may include:  ?  Numbness or tingling. ?  Weakness. ?  Pain.   You lose bladder or bowel control.   You have loose stitches, or your incision comes open.   You have blood or fluid draining from the incision.   You have signs of infection, such as:  ?  Increased pain, swelling, warmth, or redness. ?  Pus draining from the incision. ? A fever. ? Red streaks leading from the incision.

## 2018-12-04 ENCOUNTER — OFFICE VISIT (OUTPATIENT)
Dept: ORTHOPEDIC SURGERY | Age: 63
End: 2018-12-04

## 2018-12-04 VITALS
RESPIRATION RATE: 14 BRPM | BODY MASS INDEX: 41.5 KG/M2 | DIASTOLIC BLOOD PRESSURE: 76 MMHG | SYSTOLIC BLOOD PRESSURE: 132 MMHG | HEART RATE: 91 BPM | OXYGEN SATURATION: 96 % | HEIGHT: 69 IN | TEMPERATURE: 97.8 F | WEIGHT: 280.2 LBS

## 2018-12-04 DIAGNOSIS — M48.061 SPINAL STENOSIS OF LUMBAR REGION WITHOUT NEUROGENIC CLAUDICATION: ICD-10-CM

## 2018-12-04 DIAGNOSIS — Z98.890 S/P LUMBAR LAMINECTOMY: Primary | ICD-10-CM
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
## 2018-12-24 ENCOUNTER — OFFICE VISIT (OUTPATIENT)
Dept: FAMILY MEDICINE CLINIC | Age: 63
End: 2018-12-24

## 2018-12-24 VITALS
TEMPERATURE: 98.5 F | DIASTOLIC BLOOD PRESSURE: 72 MMHG | WEIGHT: 273 LBS | OXYGEN SATURATION: 95 % | HEART RATE: 101 BPM | HEIGHT: 69 IN | RESPIRATION RATE: 14 BRPM | SYSTOLIC BLOOD PRESSURE: 126 MMHG | BODY MASS INDEX: 40.43 KG/M2

## 2018-12-24 DIAGNOSIS — Z00.00 INITIAL MEDICARE ANNUAL WELLNESS VISIT: Primary | ICD-10-CM

## 2018-12-24 DIAGNOSIS — Z71.89 ACP (ADVANCE CARE PLANNING): ICD-10-CM

## 2018-12-24 DIAGNOSIS — R25.1 TREMOR OF RIGHT HAND: ICD-10-CM

## 2018-12-24 DIAGNOSIS — H93.12 TINNITUS OF LEFT EAR: ICD-10-CM

## 2018-12-24 DIAGNOSIS — H91.92 HEARING LOSS OF LEFT EAR, UNSPECIFIED HEARING LOSS TYPE: ICD-10-CM

## 2018-12-24 NOTE — ACP (ADVANCE CARE PLANNING)
Advance Care Planning (ACP) Provider Conversation Snapshot    Date of ACP Conversation: 12/24/18  Persons included in Conversation:  patient  Length of ACP Conversation in minutes:  16 minutes    Authorized Decision Maker (if patient is incapable of making informed decisions):    This person is:   Healthcare Agent/Medical Power of  under Advance Directive          For Patients with Decision Making Capacity:   Values/Goals: Exploration of values, goals, and preferences if recovery is not expected, even with continued medical treatment in the event of:  Imminent death  Severe, permanent brain injury    Conversation Outcomes / Follow-Up Plan:   Recommended completion of Advance Directive form after review of ACP materials and conversation with prospective healthcare agent

## 2018-12-24 NOTE — PATIENT INSTRUCTIONS
Medicare Wellness Visit, Male    The best way to live healthy is to have a lifestyle where you eat a well-balanced diet, exercise regularly, limit alcohol use, and quit all forms of tobacco/nicotine, if applicable. Regular preventive services are another way to keep healthy. Preventive services (vaccines, screening tests, monitoring & exams) can help personalize your care plan, which helps you manage your own care. Screening tests can find health problems at the earliest stages, when they are easiest to treat. [facility] follows the current, evidence-based guidelines published by the [facility] ([facility]) when recommending preventive services for our patients. Because we follow these guidelines, sometimes recommendations change over time as research supports it. (For example, a prostate screening blood test is no longer routinely recommended for men with no symptoms.)  Of course, you and your doctor may decide to screen more often for some diseases, based on your risk and co-morbidities (chronic disease you are already diagnosed with). Preventive services for you include:  - Medicare offers their members a free annual wellness visit, which is time for you and your primary care provider to discuss and plan for your preventive service needs. Take advantage of this benefit every year!  -All adults over age 72 should receive the recommended pneumonia vaccines. Current USPSTF guidelines recommend a series of two vaccines for the best pneumonia protection.   -All adults should have a flu vaccine yearly and an ECG.  All adults age 61 and older should receive a shingles vaccine once in their lifetime.    -All adults age 38-68 who are overweight should have a diabetes screening test once every three years.   -Other screening tests & preventive services for persons with diabetes include: an eye exam to screen for diabetic retinopathy, a kidney function test, a foot exam, and stricter control over your cholesterol.   -Cardiovascular screening for adults with routine risk involves an electrocardiogram (ECG) at intervals determined by the provider.   -Colorectal cancer screening should be done for adults age 54-65 with no increased risk factors for colorectal cancer. There are a number of acceptable methods of screening for this type of cancer. Each test has its own benefits and drawbacks. Discuss with your provider what is most appropriate for you during your annual wellness visit. The different tests include: colonoscopy (considered the best screening method), a fecal occult blood test, a fecal DNA test, and sigmoidoscopy.  -All adults born between [facility] should be screened once for Hepatitis C.  -An Abdominal Aortic Aneurysm (AAA) Screening is recommended for men age 73-68 who has ever smoked in their lifetime.      Here is a list of your current Health Maintenance items (your personalized list of preventive services) with a due date:  Health Maintenance Due   Topic Date Due    Shingles Vaccine (1 of 2) 05/24/2005    Annual Well Visit  10/29/2018
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
## 2019-01-02 ENCOUNTER — PATIENT OUTREACH (OUTPATIENT)
Dept: FAMILY MEDICINE CLINIC | Age: 64
End: 2019-01-02

## 2019-01-16 ENCOUNTER — OFFICE VISIT (OUTPATIENT)
Dept: ORTHOPEDIC SURGERY | Age: 64
End: 2019-01-16

## 2019-01-16 VITALS
RESPIRATION RATE: 19 BRPM | HEART RATE: 107 BPM | OXYGEN SATURATION: 98 % | WEIGHT: 271.4 LBS | DIASTOLIC BLOOD PRESSURE: 69 MMHG | HEIGHT: 69 IN | TEMPERATURE: 98.1 F | BODY MASS INDEX: 40.2 KG/M2 | SYSTOLIC BLOOD PRESSURE: 122 MMHG

## 2019-01-16 DIAGNOSIS — M51.37 DEGENERATIVE DISC DISEASE AT L5-S1 LEVEL: Primary | ICD-10-CM

## 2019-01-16 DIAGNOSIS — Z98.890 S/P LAMINECTOMY: ICD-10-CM

## 2019-01-16 DIAGNOSIS — M47.816 LUMBAR FACET ARTHROPATHY: ICD-10-CM

## 2019-01-16 NOTE — PATIENT INSTRUCTIONS
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
## 2019-01-22 ENCOUNTER — HOSPITAL ENCOUNTER (OUTPATIENT)
Dept: PHYSICAL THERAPY | Age: 64
Discharge: HOME | End: 2019-01-22
Payer: MEDICARE

## 2019-01-22 PROCEDURE — 97140 MANUAL THERAPY 1/> REGIONS: CPT

## 2019-01-22 PROCEDURE — 97162 PT EVAL MOD COMPLEX 30 MIN: CPT

## 2019-01-22 PROCEDURE — 97110 THERAPEUTIC EXERCISES: CPT

## 2019-01-22 NOTE — PROGRESS NOTES
PT DAILY TREATMENT NOTE/LUMBAR EVAL 10-18    Patient Name: Jono Cueva  Date:2019  : 1955  [x]  Patient  Verified  Payor: IMTIAZ / Plan: Corey Bailey [address] / Product Type:  /    In time: 10:50 Out time:11:30  Total Treatment Time (min): 40  Visit #: 1 of 16    Medicare/[facility] Only   Total Timed Codes (min):  23 1:1 Treatment Time:  40     Treatment Area: No admission diagnoses are documented for this encounter. SUBJECTIVE  Pain Level (0-10 scale): *4/10   []constant []intermittent []improving []worsening []no change since onset    Any medication changes, allergies to medications, adverse drug reactions, diagnosis change, or new procedure performed?: [x] No    [] Yes (see summary sheet for update)  Subjective functional status/changes:     PLOF: Patient reports that he was able to walk longer distances, and sit for a prolonged period of time. Patient does not do his own household management   Limitations to PLOF: pain   Mechanism of Injury: NO specific injury led to surgery. Patient presents with LBP s/p bilateral L4-5 Lami for stenosis on 10/22/18. Patient denies any post-op complications. No blood clots, no infection. Patient stated that he has developed left sided low back/hip pain following the surgery. Patient denies any numbness/tingling/weakness down the legs. Current symptoms/Complaints:Patient describes pain as constant and has difficulty describing the pain. Patient has difficulty sitting more then 10-15 minutes, stand/walk 5 minutes. Patient uses the [facility] for all ambulation, but has been using it secondary to ankle issues. Patient stated that he still has a lifting restriction but was not able to recall the weight. Previous Treatment/Compliance: aquatic PT for back- 2018- no improvement in back, but had improvement in ankle and knee. PMHx/Surgical Hx:  Left ankle replacement: , Right knee scope: Spring 2018, No Hip surg.  No pacemaker, no cardiac issues, no CA.   Work Hx: retired   Living Situation: no stairs in home and uses a ramp. Pt Goals:in chart  Barriers: []pain []financial []time []transportation []other  Motivation: moderate  Substance use: []Alcohol []Tobacco []other:   FABQ Score: []low []elevate  Cognition: A & O x 2    Other:    OBJECTIVE/EXAMINATION    17 min [x]Eval                  []Re-Eval       15 min Therapeutic Exercise:  [x] See flow sheet : HEP creation and review    Rationale: increase ROM and increase strength to improve the patients ability to perform ADls. *8 min Manual Therapy:  Gentle STM to left side of low back, and musculature at iliac crest    Rationale: decrease pain, increase ROM and increase tissue extensibility to increase ease with ADls. With   [] TE   [] TA   [] neuro   [] other: Patient Education: [x] Review HEP    [] Progressed/Changed HEP based on:   [] positioning   [] body mechanics   [] transfers   [] heat/ice application    [] other:      Other Objective/Functional Measures: See below     Physical Therapy Evaluation - Lumbar Spine ([facility])    SUBJECTIVE ( see above)      General Health:  Red Flags Indicated? [] Yes    [] No  [] Yes [] No Recent weight change (If yes, due to dieting?  [] Yes  [] No)   [] Yes [] No Weakness in legs during walking  [] Yes [] No Unremitting pain at night  [] Yes [] No Abdominal pain or problems  [] Yes [] No Rectal bleeding  [] Yes [] No Feet more cold or painful in cold weather  [] Yes [] No Menstrual irregularities  [] Yes [] No Blood or pain with urination  [] Yes [x] No Dysfunction of bowel or bladder  [] Yes [] No Recent illness within past 3 weeks (i.e, cold, flu)  [] Yes [] No Numbness/tingling in buttock/genitalia region    Past History/Treatments:     Diagnostic Tests: [] Lab work [] X-rays    [] CT [] MRI     [] Other:  Results: none recent       OBJECTIVE  Posture:  Lateral Shift: [] R    [] L     [] +  [] -  Kyphosis: [x] Increased [] Decreased   [] WNL  Lordosis:  [] Increased [x] Decreased   [] WNL  Pelvic symmetry: [] WNL    [] Other:    Gait:  [] Normal     [x] Abnormal: antalgic with use of SPC and report of increased left sided low back pain during Left LE weight bearing phase of gait. Active Movements: [] N/A   [] Too acute   [] Other:  ROM % AROM % PROM Comments:pain, area   Forward flexion 40-60 NT     Extension 20-30 NT     SB right 20-30 WLF     SB left 20-30 25%  Pain    Rotation right 5-10 NT     Rotation left 5-10 NT         Dural Mobility:  SLR Sitting: [] R    [] L    [] +    [] -  @ (degrees):           Supine: [] R    [] L    [] +    [] -  @ (degrees):   Slump Test: [] R    [x] L    [] +    [x] -  @ (degrees):   Prone Knee Bend: [] R    [] L    [] +    [] -     Palpation  [] Min  [x] Mod  [] Severe    Location: TTP at left Flank/ QL  [] Min  [] Mod  [] Severe    Location:  [] Min  [] Mod  [] Severe    Location:    Strength   L(0-5) R (0-5) N/T   Hip Flexion (L1,2) 3+ 4- []   Knee Extension (L3,4) 5 5 []   Ankle Dorsiflexion (L4)   []   Great Toe Extension (L5)   []   Ankle Plantarflexion (S1)   []   Knee Flexion (S1,2) 5 5 []   Upper Abdominals   []   Lower Abdominals   []   Paraspinals   []   Back Rotators   []   Gluteus Paco 3- 3 []   HIp Abd 3  []     Special Tests       Global Muscular Weakness:  Abdominals: poor TA activation    Other tests/comments:   Patient challenged with supine bridges, and not able to fully clear buttock, but denied pain at left side of low back. Pain Level (0-10 scale) post treatment: 4/10     ASSESSMENT/Changes in Function: See POC. Patient reported reduced pain following manual and stated that STM helped to reduce pain. Therapist advised patient to perform HEP gently and to stop if pain increases.      Patient will continue to benefit from skilled PT services to modify and progress therapeutic interventions, address functional mobility deficits, address ROM deficits, address strength deficits, analyze and address soft tissue restrictions, analyze and cue movement patterns, assess and modify postural abnormalities and address imbalance/dizziness to attain remaining goals. [x]  See Plan of Care  []  See progress note/recertification  []  See Discharge Summary         Progress towards goals / Updated goals:    Short Term Goals: To be accomplished in 3 weeks:              1. Patient will be ind and compliant with HEP 1-2x/day to increase ease with ADLs. Eval: HEP established               2. Patient will be able to perform 5 bridges with buttock clearance with pain no more then 1/10 to increase ease with bed mobility. Eval: Patient is unable to clear buttock with a supine bridge  Long Term Goals: To be accomplished in 8 weeks:              1. Patient will improve FOTO to at least 47 to assist with return to PLOF. Eval: 33              2. Patient will be able to ambulate 200 feet in clinic with [facility] with low back pain no more then 2/10 to increase ease with home navigation. Eval: Patient presents with a gait dysfunction consisting of an antalgic gait with use of SPC and report of increased left sided LBP during Left LE weight bearing phase of gait. 3. Patient will report being able to sit for 20 minutes to increase ease with driving. Eval: Patient reports being able to sit for 10-15 minutes               4. Patient will improve left hip abd and hip extension strength to 3+/5 to increase safety with transfers.                 Eval: Hip strength: Abd: Left: 3/5 Ext: 3-/5      PLAN  []  Upgrade activities as tolerated     [x]  Continue plan of care  []  Update interventions per flow sheet       []  Discharge due to:_  []  Other:_      Deborha Litten, PT 1/22/2019  10:35 AM
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
## 2019-01-24 ENCOUNTER — HOSPITAL ENCOUNTER (OUTPATIENT)
Dept: PHYSICAL THERAPY | Age: 64
Discharge: HOME | End: 2019-01-24
Payer: MEDICARE

## 2019-01-24 PROCEDURE — 97110 THERAPEUTIC EXERCISES: CPT

## 2019-01-24 PROCEDURE — 97140 MANUAL THERAPY 1/> REGIONS: CPT

## 2019-01-24 NOTE — PROGRESS NOTES
PT DAILY TREATMENT NOTE 10-18    Patient Name: Abraham Euceda  Date:2019  : 1955  [x]  Patient  Verified  Payor: [place] MEDICARE / Plan: VA MEDICARE PART A & B / Product Type: Medicare /    In time:0900  Out time:09  Total Treatment Time (min): 55  Visit #: 2 of 16    Medicare/BCBS Only   Total Timed Codes (min):  45 1:1 Treatment Time:  45       Treatment Area: Lumbar spine pain [M54.5]    SUBJECTIVE  Pain Level (0-10 scale): 3  Any medication changes, allergies to medications, adverse drug reactions, diagnosis change, or new procedure performed?: [x] No    [] Yes (see summary sheet for update)  Subjective functional status/changes:   [] No changes reported  Started my exercises at home    OBJECTIVE    Modality rationale: decrease inflammation, decrease pain and increase tissue extensibility to improve the patients ability to perform increased ADL   Min Type Additional Details    [] Estim:  []Unatt       []IFC  []Premod                        []Other:  []w/ice   []w/heat  Position:  Location:    [] Estim: []Att    []TENS instruct  []NMES                    []Other:  []w/US   []w/ice   []w/heat  Position:  Location:    []  Traction: [] Cervical       []Lumbar                       [] Prone          []Supine                       []Intermittent   []Continuous Lbs:  [] before manual  [] after manual    []  Ultrasound: []Continuous   [] Pulsed                           []1MHz   []3MHz W/cm2:  Location:    []  Iontophoresis with dexamethasone         Location: [] Take home patch   [] In clinic   10 [x]  Ice     []  heat  []  Ice massage  []  Laser   []  Anodyne Position: Prone  Location: Left L/s/glute    []  Laser with stim  []  Other:  Position:  Location:    []  Vasopneumatic Device Pressure:       [] lo [] med [] hi   Temperature: [] lo [] med [] hi   [x] Skin assessment post-treatment:  []intact []redness- no adverse reaction    []redness - adverse reaction:     30 min Therapeutic Exercise:  [x] See flow sheet :   Rationale: increase ROM, increase strength and improve coordination to improve the patients ability to tolerate increased activity levels    15 min Manual Therapy:  STM/DTM Left Lumbar paraspinal/Glute   Rationale: decrease pain, increase ROM, increase tissue extensibility and decrease trigger points to walk with little pain          With   [x] TE   [] TA   [] neuro   [] other: Patient Education: [x] Review HEP    [] Progressed/Changed HEP based on:   [] positioning   [] body mechanics   [] transfers   [] heat/ice application    [] other:      Other Objective/Functional Measures:  - TTP lateral aspect of the left L/S/Glut region       Pain Level (0-10 scale) post treatment: 1-2    ASSESSMENT/Changes in Function:   - Good tolerance with first full treatment      Patient will continue to benefit from skilled PT services to modify and progress therapeutic interventions, address functional mobility deficits, address ROM deficits, address strength deficits, analyze and address soft tissue restrictions and analyze and cue movement patterns to attain remaining goals. []  See Plan of Care  []  See progress note/recertification  []  See Discharge Summary         Progress towards goals / Updated goals:  Short Term Goals: To be accomplished in 3 weeks:              1. Patient will be ind and compliant with HEP 1-2x/day to increase ease with ADLs. Current: Reports initiation of HEP 1/24/19              2. Patient will be able to perform 5 bridges with buttock clearance with pain no more then 1/10 to increase ease with bed mobility. Long Term Goals: To be accomplished in 8 weeks:              1. Patient will improve FOTO to at least 47 to assist with return to PLOF. 2. Patient will be able to ambulate 200 feet in clinic with [facility] with low back pain no more then 2/10 to increase ease with home navigation.                3. Patient will report being able to sit for 20 minutes to increase ease with driving. 4. Patient will improve left hip abd and hip extension strength to 3+/5 to increase safety with transfers.           PLAN  [x]  Upgrade activities as tolerated     [x]  Continue plan of care  []  Update interventions per flow sheet       []  Discharge due to:_  []  Other:_      Devinkath Head, PT 1/24/2019  8:59 AM    Future Appointments   Date Time Provider Missy Godoy   1/24/2019  9:00 AM Nik Obrien, PT [facility]   1/29/2019  9:00 AM Nik Obrien, PT [facility]   2/1/2019  8:00 AM Nik Obrien, PT [facility]   2/4/2019  8:00 AM Nik Obrien, PT [facility]   2/6/2019  9:00 AM Nik Obrien, PT [facility]   2/11/2019  8:00 AM Nik Obrien, PT [facility]   2/14/2019  9:30 AM [facility]   2/19/2019  9:00 AM Nik Obrien, PT [facility]   2/21/2019  9:00 AM Nikalaina Obrien, PT [facility]   2/25/2019  8:00 AM Nik Obrien, PT [facility]   2/25/2019  8:45 AM Ashutosh Manzano MD [facility]   2/28/2019  9:00 AM [facility]   4/4/2019  8:00 AM Ashutosh Manzano MD [facility]   4/17/2019  9:10 AM Anais Simms  [address]

## 2019-01-29 ENCOUNTER — HOSPITAL ENCOUNTER (OUTPATIENT)
Dept: PHYSICAL THERAPY | Age: 64
Discharge: HOME | End: 2019-01-29
Payer: MEDICARE

## 2019-01-29 PROCEDURE — 97530 THERAPEUTIC ACTIVITIES: CPT

## 2019-01-29 PROCEDURE — 97140 MANUAL THERAPY 1/> REGIONS: CPT

## 2019-01-29 PROCEDURE — 97110 THERAPEUTIC EXERCISES: CPT

## 2019-01-29 NOTE — PROGRESS NOTES
PT DAILY TREATMENT NOTE 10-18    Patient Name: Peyton Marquez  Date:2019  : 1955  [x]  Patient  Verified  Payor: Carlos A Art / Plan: VA MEDICARE PART A & B / Product Type: Medicare /    In time:0900  Out time: 957  Total Treatment Time (min): 55  Visit #: 3 of 16    Medicare/BCBS Only   Total Timed Codes (min):  55 1:1 Treatment Time:  55       Treatment Area: Lumbar spine pain [M54.5]    SUBJECTIVE  Pain Level (0-10 scale):  2  Any medication changes, allergies to medications, adverse drug reactions, diagnosis change, or new procedure performed?: [x] No    [] Yes (see summary sheet for update)  Subjective functional status/changes:   [] No changes reported  Doing alright    OBJECTIVE      30 min Therapeutic Exercise:  [x] See flow sheet :   Rationale: increase ROM, increase strength and improve coordination to improve the patients ability to tolerate increased activity levels  10 min Therapeutic Activity:  []  See flow sheet :   Rationale: increase ROM, increase strength and improve coordination  to improve the patients ability to improve activity tolerance    15 min Manual Therapy:  STM/DTM Left Lumbar paraspinal/Glute   Rationale: decrease pain, increase ROM, increase tissue extensibility and decrease trigger points to walk with little pain          With   [x] TE   [] TA   [] neuro   [] other: Patient Education: [x] Review HEP    [] Progressed/Changed HEP based on:   [] positioning   [] body mechanics   [] transfers   [] heat/ice application    [] other:      Other Objective/Functional Measures:  - Add Bike with good tolerance for 4 min Lv 1      Pain Level (0-10 scale) post treatment: 2    ASSESSMENT/Changes in Function:   - Good tolerance with treatment  - Pt responded well to [facility] with less discomfort      Patient will continue to benefit from skilled PT services to modify and progress therapeutic interventions, address functional mobility deficits, address ROM deficits, address strength deficits, analyze and address soft tissue restrictions and analyze and cue movement patterns to attain remaining goals. []  See Plan of Care  []  See progress note/recertification  []  See Discharge Summary         Progress towards goals / Updated goals:  Short Term Goals: To be accomplished in 3 weeks:              1. Patient will be ind and compliant with HEP 1-2x/day to increase ease with ADLs. Current: Reports initiation of HEP 1/24/19              2. Patient will be able to perform 5 bridges with buttock clearance with pain no more then 1/10 to increase ease with bed mobility. Current:  Met 1/29/19  Long Term Goals: To be accomplished in 8 weeks:              1. Patient will improve FOTO to at least 47 to assist with return to [facility]. 2. Patient will be able to ambulate 200 feet in clinic with [facility] with low back pain no more then 2/10 to increase ease with home navigation. 3. Patient will report being able to sit for 20 minutes to increase ease with driving. 4. Patient will improve left hip abd and hip extension strength to 3+/5 to increase safety with transfers.           PLAN  [x]  Upgrade activities as tolerated     [x]  Continue plan of care  []  Update interventions per flow sheet       []  Discharge due to:_  []  Other:_      Law Steinberg, PT 1/29/2019  8:59 AM    Future Appointments   Date Time Provider Missy Godoy   2/1/2019  8:00 AM Yanick Riding, PT [facility]   2/4/2019  8:00 AM Yanick Riding, PT [facility]   2/6/2019  9:00 AM Stu ZAVALETA, PT [facility]   2/11/2019  8:00 AM Yanick Riding, PT [facility]   2/14/2019  9:30 AM Yanick Riding, PT [facility]   2/19/2019  9:00 AM Stu ZAVALETA, PT [facility]   2/21/2019  9:00 AM Stu ZAVALETA, PT [facility]   2/25/2019  8:00 AM Yanick Arambula, PT [facility]   2/25/2019  8:45 AM Kala Diaz MD [facility]   2/28/2019 9:00 AM Tierra Smith, PT [place] [facility]   4/4/2019  8:00 AM Lotus Lynn MD [facility]   4/17/2019  9:10 AM Ross Davidson  [address]

## 2019-02-01 ENCOUNTER — HOSPITAL ENCOUNTER (OUTPATIENT)
Dept: PHYSICAL THERAPY | Age: 64
Discharge: HOME | End: 2019-02-01
Payer: MEDICARE

## 2019-02-01 PROCEDURE — 97110 THERAPEUTIC EXERCISES: CPT

## 2019-02-01 NOTE — PROGRESS NOTES
PT DAILY TREATMENT NOTE 10-18    Patient Name: Simi Found  Date:2019  : 1955  [x]  Patient  Verified  Payor: VA MEDICARE / Plan: VA MEDICARE PART A & B / Product Type: Medicare /    In time:0800 Out time: 0846  Total Treatment Time (min): 46  Visit #: 4 of 16    Medicare/BCBS Only   Total Timed Codes (min):  46 1:1 Treatment Time:  38       Treatment Area: Lumbar spine pain [M54.5]    SUBJECTIVE  Pain Level (0-10 scale): 1  Any medication changes, allergies to medications, adverse drug reactions, diagnosis change, or new procedure performed?: [x] No    [] Yes (see summary sheet for update)  Subjective functional status/changes:   [] No changes reported  Doing good    OBJECTIVE      41 min Therapeutic Exercise:  [x] See flow sheet :   Rationale: increase ROM, increase strength and improve coordination to improve the patients ability to tolerate increased activity levels  5 min Therapeutic Activity:  [x]  See flow sheet :   Rationale: increase ROM, increase strength and improve coordination  to improve the patients ability to improve activity tolerance              With   [x] TE   [] TA   [] neuro   [] other: Patient Education: [x] Review HEP    [] Progressed/Changed HEP based on:   [] positioning   [] body mechanics   [] transfers   [] heat/ice application    [] other:      Other Objective/Functional Measures:  - Add TM with good tolerance for 1/4 mile      Pain Level (0-10 scale) post treatment: 1    ASSESSMENT/Changes in Function:   - Good tolerance with treatment  - Pt responded well to treatment with improved trunk mobility  - Pt amb on TM for 1/4 mile in 4'45\" without difficulty      Patient will continue to benefit from skilled PT services to modify and progress therapeutic interventions, address functional mobility deficits, address ROM deficits, address strength deficits, analyze and address soft tissue restrictions and analyze and cue movement patterns to attain remaining goals.      [] See Plan of Care  []  See progress note/recertification  []  See Discharge Summary         Progress towards goals / Updated goals:  Short Term Goals: To be accomplished in 3 weeks:              1. Patient will be ind and compliant with HEP 1-2x/day to increase ease with ADLs. Current: Reports initiation of HEP 1/24/19              2. Patient will be able to perform 5 bridges with buttock clearance with pain no more then 1/10 to increase ease with bed mobility. Current:  Met 1/29/19  Long Term Goals: To be accomplished in 8 weeks:              1. Patient will improve FOTO to at least 47 to assist with return to PLOF. 2. Patient will be able to ambulate 200 feet in clinic with [facility] with low back pain no more then 2/10 to increase ease with home navigation. Cuttent:  Met with TM ambulation for 1/4 mile 2/1/19               3. Patient will report being able to sit for 20 minutes to increase ease with driving. 4. Patient will improve left hip abd and hip extension strength to 3+/5 to increase safety with transfers.           PLAN  [x]  Upgrade activities as tolerated     [x]  Continue plan of care  []  Update interventions per flow sheet       []  Discharge due to:_  []  Other:_      Beufnicole Folds, PT 2/1/2019  8:05 AM    Future Appointments   Date Time Provider Missy Godoy   2/4/2019  8:00 AM Jacquie Height, PT [facility]   2/6/2019  9:00 AM Jacquie Height, PT [facility]   2/11/2019  8:00 AM Jacquie Height, PT [facility]   2/14/2019  9:30 AM Jacquie Height, PT [facility]   2/19/2019  9:00 AM Domenico ZAVALETA, PT [facility]   2/21/2019  9:00 AM Jacquie Height, PT [facility]   2/25/2019  8:00 AM Jacquie Height, PT [facility]   2/25/2019  8:45 AM Jaylen Garcia MD [facility]   2/28/2019  9:00 AM Jacquie Michel, PT [facility]   4/4/2019  8:00 AM Jaylen Garcia MD [facility] 4/17/2019  9:10 AM Lester Soto, [facility] [address]

## 2019-02-04 ENCOUNTER — HOSPITAL ENCOUNTER (OUTPATIENT)
Dept: PHYSICAL THERAPY | Age: 64
Discharge: HOME | End: 2019-02-04
Payer: MEDICARE

## 2019-02-04 PROCEDURE — 97110 THERAPEUTIC EXERCISES: CPT

## 2019-02-04 PROCEDURE — 97530 THERAPEUTIC ACTIVITIES: CPT

## 2019-02-04 NOTE — PROGRESS NOTES
PT DAILY TREATMENT NOTE 10-18    Patient Name: Simi Found  Date:2019  : 1955  [x]  Patient  Verified  Payor: Vaishali Gunn / Plan: VA MEDICARE PART A & B / Product Type: Medicare /    In time:0800 Out time: 0839  Total Treatment Time (min): 39  Visit #: 5 of 16    Medicare/BCBS Only   Total Timed Codes (min):  39 1:1 Treatment Time:  39       Treatment Area: Lumbar spine pain [M54.5]    SUBJECTIVE  Pain Level (0-10 scale):  2  Any medication changes, allergies to medications, adverse drug reactions, diagnosis change, or new procedure performed?: [x] No    [] Yes (see summary sheet for update)  Subjective functional status/changes:   [] No changes reported  Doing good    OBJECTIVE      29 min Therapeutic Exercise:  [x] See flow sheet :   Rationale: increase ROM, increase strength and improve coordination to improve the patients ability to tolerate increased activity levels  10 min Therapeutic Activity:  [x]  See flow sheet :   Rationale: increase ROM, increase strength and improve coordination  to improve the patients ability to improve activity tolerance              With   [x] TE   [] TA   [] neuro   [] other: Patient Education: [x] Review HEP    [] Progressed/Changed HEP based on:   [] positioning   [] body mechanics   [] transfers   [] heat/ice application    [] other:      Other Objective/Functional Measures:  - Add TM with good tolerance for 1/4 mile      Pain Level (0-10 scale) post treatment: 0    ASSESSMENT/Changes in Function:   - Good tolerance with treatment  - Pt responded well to treatment with improved trunk mobility  - Pt amb on TM for 1/4 mile in 4 without difficulty  - - Increased pace at 4.5mph with good tolerance      Patient will continue to benefit from skilled PT services to modify and progress therapeutic interventions, address functional mobility deficits, address ROM deficits, address strength deficits, analyze and address soft tissue restrictions and analyze and cue movement patterns to attain remaining goals. []  See Plan of Care  []  See progress note/recertification  []  See Discharge Summary         Progress towards goals / Updated goals:  Short Term Goals: To be accomplished in 3 weeks:              1. Patient will be ind and compliant with HEP 1-2x/day to increase ease with ADLs. Current: Reports initiation of HEP 1/24/19              2. Patient will be able to perform 5 bridges with buttock clearance with pain no more then 1/10 to increase ease with bed mobility. Current:  Met 1/29/19  Long Term Goals: To be accomplished in 8 weeks:              1. Patient will improve FOTO to at least 47 to assist with return to PLOF. 2. Patient will be able to ambulate 200 feet in clinic with [facility] with low back pain no more then 2/10 to increase ease with home navigation. Cuttent:  Met with TM ambulation for 1/4 mile 2/1/19               3. Patient will report being able to sit for 20 minutes to increase ease with driving. 4. Patient will improve left hip abd and hip extension strength to 3+/5 to increase safety with transfers.           PLAN  [x]  Upgrade activities as tolerated     [x]  Continue plan of care  []  Update interventions per flow sheet       []  Discharge due to:_  []  Other:_      Law Steinberg, PT 2/4/2019  8:29 AM    Future Appointments   Date Time Provider Missy Godoy   2/6/2019  9:00 AM Yanick Riding, PT [facility]   2/11/2019  8:00 AM Yanick Riding, PT [facility]   2/14/2019  9:30 AM Yanick Riding, PT [facility]   2/19/2019  9:00 AM Stu ZAVALETA, PT [facility]   2/21/2019  9:00 AM Yanick Riding, PT [facility]   2/25/2019  8:00 AM Yanick Arambula, PT [facility]   2/25/2019  8:45 AM Kala Diaz MD [facility]   2/28/2019  9:00 AM Yanick Arambula, PT [facility]   4/4/2019  8:00 AM Kala Diaz MD [facility] 4/17/2019  9:10 AM Christina Soto, [facility] [address]

## 2019-02-06 ENCOUNTER — HOSPITAL ENCOUNTER (OUTPATIENT)
Dept: PHYSICAL THERAPY | Age: 64
Discharge: HOME | End: 2019-02-06
Payer: MEDICARE

## 2019-02-06 PROCEDURE — 97530 THERAPEUTIC ACTIVITIES: CPT

## 2019-02-06 PROCEDURE — 97110 THERAPEUTIC EXERCISES: CPT

## 2019-02-06 NOTE — PROGRESS NOTES
PT DAILY TREATMENT NOTE 10-18    Patient Name: Poly Mcclelland  Date:2019  : 1955  [x]  Patient  Verified  Payor: Regina Rodriguez / Plan: [place] MEDICARE PART A & B / Product Type: Medicare /    In time:910 Out time:   Total Treatment Time (min): 50  Visit #: 6 of 16    Medicare/BCBS Only   Total Timed Codes (min):  50 1:1 Treatment Time:  45       Treatment Area: Lumbar spine pain [M54.5]    SUBJECTIVE  Pain Level (0-10 scale):  2  Any medication changes, allergies to medications, adverse drug reactions, diagnosis change, or new procedure performed?: [x] No    [] Yes (see summary sheet for update)  Subjective functional status/changes:   [] No changes reported  Doing good    OBJECTIVE      33 min Therapeutic Exercise:  [x] See flow sheet :   Rationale: increase ROM, increase strength and improve coordination to improve the patients ability to tolerate increased activity levels  17 min Therapeutic Activity:  [x]  See flow sheet :   Rationale: increase ROM, increase strength and improve coordination  to improve the patients ability to improve activity tolerance              With   [x] TE   [] TA   [] neuro   [] other: Patient Education: [x] Review HEP    [] Progressed/Changed HEP based on:   [] positioning   [] body mechanics   [] transfers   [] heat/ice application    [] other:      Other Objective/Functional Measures:  - Amb 1/2 mile  - MMT (B) Hip ABD 5-/5 Ext Right 5 Left 4+    Pain Level (0-10 scale) post treatment: 1    ASSESSMENT/Changes in Function:   - Good tolerance with treatment  - Pt responded well to treatment with improved trunk mobility  - Pt amb on TM for 1/4 mile in 4 without difficulty  - - Increased pace at 4.5mph with good tolerance      Patient will continue to benefit from skilled PT services to modify and progress therapeutic interventions, address functional mobility deficits, address ROM deficits, address strength deficits, analyze and address soft tissue restrictions and analyze and cue movement patterns to attain remaining goals. []  See Plan of Care  []  See progress note/recertification  []  See Discharge Summary         Progress towards goals / Updated goals:  Short Term Goals: To be accomplished in 3 weeks:              1. Patient will be ind and compliant with HEP 1-2x/day to increase ease with ADLs. Current: Reports initiation of HEP 1/24/19              2. Patient will be able to perform 5 bridges with buttock clearance with pain no more then 1/10 to increase ease with bed mobility. Current:  Met 1/29/19  Long Term Goals: To be accomplished in 8 weeks:              1. Patient will improve FOTO to at least 47 to assist with return to PLOF. 2. Patient will be able to ambulate 200 feet in clinic with [facility] with low back pain no more then 2/10 to increase ease with home navigation. Cuttent:  Met with TM ambulation for 1/4 mile 2/1/19               3. Patient will report being able to sit for 20 minutes to increase ease with driving. Current: Met for 20 min but c/o pain at 30min 2/6/19              4. Patient will improve left hip abd and hip extension strength to 3+/5 to increase safety with transfers.                                   Current:  Met MMT (B) Hip ABD 5-/5 Ext Right 5 Left 4+ 2/6/19          PLAN  [x]  Upgrade activities as tolerated     [x]  Continue plan of care  []  Update interventions per flow sheet       []  Discharge due to:_  []  Other:_      Montse Evans PT 2/6/2019  8:29 AM    Future Appointments   Date Time Provider Missy Godoy   2/11/2019  8:00 AM Sallie Smith, PT [facility]   2/14/2019  9:30 AM Sallie Smith, PT [facility]   2/19/2019  9:00 AM Fabio ZAVALETA PT [facility]   2/21/2019  9:00 AM Sallie Smith PT [facility]   2/25/2019  8:00 AM Sallie Smith, PT [facility]   2/25/2019  8:45 AM Cooper Copeland, Rachel Boss MD [facility]   2/28/2019  9:00 AM Nael Marques, PT [place] [facility]   4/4/2019  8:00 AM MD Rhoda Felix   4/17/2019  9:10 AM Janee Eden,  [address]

## 2019-02-11 ENCOUNTER — HOSPITAL ENCOUNTER (OUTPATIENT)
Dept: PHYSICAL THERAPY | Age: 64
Discharge: HOME | End: 2019-02-11
Payer: MEDICARE

## 2019-02-11 PROCEDURE — 97530 THERAPEUTIC ACTIVITIES: CPT

## 2019-02-11 PROCEDURE — 97110 THERAPEUTIC EXERCISES: CPT

## 2019-02-11 PROCEDURE — 97140 MANUAL THERAPY 1/> REGIONS: CPT

## 2019-02-11 NOTE — PROGRESS NOTES
PT DAILY TREATMENT NOTE 10-18    Patient Name: Tony Solares  Date:2019  : 1955  [x]  Patient  Verified  Payor: [place] MEDICARE / Plan: [place] MEDICARE PART A & B / Product Type: Medicare /    In [id number] Out time: 0856  Total Treatment Time (min): 38  Visit #: 7 of 16    Medicare/BCBS Only   Total Timed Codes (min):  38 1:1 Treatment Time:  38       Treatment Area: Lumbar spine pain [M54.5]    SUBJECTIVE  Pain Level (0-10 scale): 5  Any medication changes, allergies to medications, adverse drug reactions, diagnosis change, or new procedure performed?: [x] No    [] Yes (see summary sheet for update)  Subjective functional status/changes:   [] No changes reported  Did some driving and a lot of Ironing.   Having a lot of pain in the left side    OBJECTIVE      19 min Therapeutic Exercise:  [x] See flow sheet :   Rationale: increase ROM, increase strength and improve coordination to improve the patients ability to tolerate increased activity levels  9 min Therapeutic Activity:  [x]  See flow sheet :   Rationale: increase ROM, increase strength and improve coordination  to improve the patients ability to improve activity tolerance  10 min Manual Therapy:  Left p/a Rib mobs, (B) Innominate p/a mobs, (B) LE LAD   Rationale: decrease pain, increase ROM and increase tissue extensibility to progress with increased dailyt activity              With   [x] TE   [] TA   [] neuro   [] other: Patient Education: [x] Review HEP    [] Progressed/Changed HEP based on:   [] positioning   [] body mechanics   [] transfers   [] heat/ice application    [] other:      Other Objective/Functional Measures:  - Post left rib at ~ T11 - 12  - elev left crest  - Decr mobility left innominate in stork stance  - Good sacral alignment    Pain Level (0-10 scale) post treatment: 2    ASSESSMENT/Changes in Function:   - Good tolerance with treatment  - Pt responded well to treatment with improved pelvic alignment and mobility        Patient will continue to benefit from skilled PT services to modify and progress therapeutic interventions, address functional mobility deficits, address ROM deficits, address strength deficits, analyze and address soft tissue restrictions and analyze and cue movement patterns to attain remaining goals. []  See Plan of Care  []  See progress note/recertification  []  See Discharge Summary         Progress towards goals / Updated goals:  Short Term Goals: To be accomplished in 3 weeks:              1. Patient will be ind and compliant with HEP 1-2x/day to increase ease with ADLs. Current: Reports initiation of HEP 1/24/19              2. Patient will be able to perform 5 bridges with buttock clearance with pain no more then 1/10 to increase ease with bed mobility. Current:  Met 1/29/19  Long Term Goals: To be accomplished in 8 weeks:              1. Patient will improve FOTO to at least 47 to assist with return to PLOF. 2. Patient will be able to ambulate 200 feet in clinic with [facility] with low back pain no more then 2/10 to increase ease with home navigation. Cuttent:  Met with TM ambulation for 1/4 mile 2/1/19               3. Patient will report being able to sit for 20 minutes to increase ease with driving. Current: Met for 20 min but c/o pain at 30min 2/6/19              4. Patient will improve left hip abd and hip extension strength to 3+/5 to increase safety with transfers.                                   Current:  Met MMT (B) Hip ABD 5-/5 Ext Right 5 Left 4+ 2/6/19          PLAN  [x]  Upgrade activities as tolerated     [x]  Continue plan of care  []  Update interventions per flow sheet       []  Discharge due to:_  []  Other:_      Law Steinberg, PT 2/11/2019  8:29 AM    Future Appointments   Date Time Provider Missy Godoy   2/14/2019  9:30 AM Yanick Whitting, PT [place] [facility] 2/19/2019  9:00 AM Juan Carlos Tomlinson, PT [facility]   2/21/2019  9:00 AM Juan Carlos Tomlinson, PT [facility]   2/25/2019  8:00 AM Juan Carlos Tomlinson, PT [facility]   2/25/2019  8:45 AM MD Rhoda Spears   2/28/2019  9:00 AM Juan Carlos Tomlinson, PT [facility]   4/4/2019  8:00 AM MD Rhoda Spaers   4/17/2019  9:10 AM Eliza Gruber,  [address]

## 2019-02-14 ENCOUNTER — HOSPITAL ENCOUNTER (OUTPATIENT)
Dept: PHYSICAL THERAPY | Age: 64
Discharge: HOME | End: 2019-02-14
Payer: MEDICARE

## 2019-02-14 PROCEDURE — 97530 THERAPEUTIC ACTIVITIES: CPT

## 2019-02-14 PROCEDURE — 97110 THERAPEUTIC EXERCISES: CPT

## 2019-02-21 ENCOUNTER — HOSPITAL ENCOUNTER (OUTPATIENT)
Dept: PHYSICAL THERAPY | Age: 64
Discharge: HOME | End: 2019-02-21
Payer: MEDICARE

## 2019-02-21 PROCEDURE — 97140 MANUAL THERAPY 1/> REGIONS: CPT

## 2019-02-21 PROCEDURE — 97035 APP MDLTY 1+ULTRASOUND EA 15: CPT

## 2019-02-21 PROCEDURE — 97110 THERAPEUTIC EXERCISES: CPT

## 2019-02-21 NOTE — PROGRESS NOTES
PT DAILY TREATMENT NOTE 10-18    Patient Name: Milton Fee  Date:2019  : 1955  [x]  Patient  Verified  Payor: Zenobia Domínguezt / Plan: [place] MEDICARE PART A & B / Product Type: Medicare /    In PCDI:[id number]  Out [id number]  Total Treatment Time (min): 24  Visit #: 9 of 16    Medicare/BCBS Only   Total Timed Codes (min):  24 1:1 Treatment Time:  24       Treatment Area: Lumbar spine pain [M54.5]    SUBJECTIVE  Pain Level (0-10 scale): 4  Any medication changes, allergies to medications, adverse drug reactions, diagnosis change, or new procedure performed?: [x] No    [] Yes (see summary sheet for update)  Subjective functional status/changes:   [] No changes reported  Having more pain on the left side    OBJECTIVE  Modality rationale: decrease inflammation, decrease pain and increase tissue extensibility to improve the patients ability to perform increased ADL   Min Type Additional Details    [] Estim:  []Unatt       []IFC  []Premod                        []Other:  []w/ice   []w/heat  Position:  Location:    [] Estim: []Att    []TENS instruct  []NMES                    []Other:  []w/US   []w/ice   []w/heat  Position:  Location:    []  Traction: [] Cervical       []Lumbar                       [] Prone          []Supine                       []Intermittent   []Continuous Lbs:  [] before manual  [] after manual   8 [x]  Ultrasound: [x]Continuous   [] Pulsed     8 min                      [x]1MHz   []3MHz Location: Left flank  W/cm2: 1.5    []  Iontophoresis with dexamethasone         Location: [] Take home patch   [] In clinic    []  Ice     []  heat  []  Ice massage  []  Laser   []  Anodyne Position:  Location:    []  Laser with stim  []  Other: Position:  Location:    []  Vasopneumatic Device Pressure:       [] lo [] med [] hi   Temperature: [] lo [] med [] hi   [x] Skin assessment post-treatment:  [x]intact []redness- no adverse reaction    []redness - adverse reaction:       3 min Therapeutic Exercise:  [] See flow sheet :   Rationale: increase ROM and increase strength to improve the patients ability to tolerate increased activity    15 min Manual Therapy:  Inf glide left sacral base, (B) Innominate p/a mobs, (B) LE LAD, (B) L/S and T/S Rot mobs     Rationale: decrease pain, increase ROM, increase tissue extensibility and decrease trigger points to perform increased ADL     With   [x] TE   [] TA   [] neuro   [] other: Patient Education: [x] Review HEP    [] Progressed/Changed HEP based on:   [] positioning   [] body mechanics   [] transfers   [] heat/ice application    [] other:      Other Objective/Functional Measures:   - Decr mobility Left Innominate in stork stance  - Elev left sacral base  - TTP Left lower rib and quad Lumb region  - Post left ribs       Pain Level (0-10 scale) post treatment: 4    ASSESSMENT/Changes in Function:   - Improve pelvic alignment and mobility after treatment  - Improved alignment of lower left ribs    Patient will continue to benefit from skilled PT services to modify and progress therapeutic interventions, address functional mobility deficits, address ROM deficits, address strength deficits, analyze and address soft tissue restrictions and analyze and cue movement patterns to attain remaining goals. []  See Plan of Care  []  See progress note/recertification  []  See Discharge Summary         Progress towards goals / Updated goals:  Short Term Goals: To be accomplished in 3 weeks:              1. Patient will be ind and compliant with HEP 1-2x/day to increase ease with ADLs. Current: Reports initiation of HEP 1/24/19              2. Patient will be able to perform 5 bridges with buttock clearance with pain no more then 1/10 to increase ease with bed mobility. Current:  Met 1/29/19  Long Term Goals: To be accomplished in 8 weeks:              1. Patient will improve FOTO to at least 47 to assist with return to PLOF. Current:  Ass at [address] 2/21/19              2. Patient will be able to ambulate 200 feet in clinic with [facility] with low back pain no more then 2/10 to increase ease with home navigation. Current:  Met with TM ambulation for 1/4 mile 2/1/19               3. Patient will report being able to sit for 20 minutes to increase ease with driving. Current: Met for 20 min but c/o pain at 30min 2/14/19              4. Patient will improve left hip abd and hip extension strength to 3+/5 to increase safety with transfers.                                   Current:  Met MMT (B) Hip ABD 5-/5 Ext Right 5 Left 4+ 2/13/19          PLAN  [x]  Upgrade activities as tolerated     [x]  Continue plan of care  []  Update interventions per flow sheet       []  Discharge due to:_  []  Other:_      Roscoe Santiago PT 2/21/2019  9:21 AM    Future Appointments   Date Time Provider Missy Godoy   2/25/2019  8:00 AM Behzad Lau PT [facility]   2/25/2019  8:45 AM Nai Hercules MD [facility]   2/28/2019  9:00 AM Behzad Lau PT [facility]   4/4/2019  8:00 AM Nai Hercules MD [facility]   4/17/2019  9:10 AM Jacobo Mahajan  [address]

## 2019-02-25 ENCOUNTER — HOSPITAL ENCOUNTER (OUTPATIENT)
Dept: PHYSICAL THERAPY | Age: 64
Discharge: HOME | End: 2019-02-25
Payer: MEDICARE

## 2019-02-25 ENCOUNTER — OFFICE VISIT (OUTPATIENT)
Dept: FAMILY MEDICINE CLINIC | Age: 64
End: 2019-02-25

## 2019-02-25 VITALS
SYSTOLIC BLOOD PRESSURE: 118 MMHG | BODY MASS INDEX: 40.58 KG/M2 | RESPIRATION RATE: 14 BRPM | WEIGHT: 274 LBS | HEIGHT: 69 IN | DIASTOLIC BLOOD PRESSURE: 80 MMHG | HEART RATE: 75 BPM | TEMPERATURE: 98.5 F | OXYGEN SATURATION: 96 %

## 2019-02-25 DIAGNOSIS — E55.9 VITAMIN D DEFICIENCY: ICD-10-CM

## 2019-02-25 DIAGNOSIS — E66.01 OBESITY, MORBID (HCC): ICD-10-CM

## 2019-02-25 DIAGNOSIS — I10 ESSENTIAL HYPERTENSION: Primary | ICD-10-CM

## 2019-02-25 PROCEDURE — 97112 NEUROMUSCULAR REEDUCATION: CPT

## 2019-02-25 PROCEDURE — 97110 THERAPEUTIC EXERCISES: CPT
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
## 2019-02-25 NOTE — PATIENT INSTRUCTIONS
High Blood Pressure: Care Instructions  Overview    It's normal for blood pressure to go up and down throughout the day. But if it stays up, you have high blood pressure. Another name for high blood pressure is hypertension. Despite what a lot of people think, high blood pressure usually doesn't cause headaches or make you feel dizzy or lightheaded. It usually has no symptoms. But it does increase your risk of stroke, heart attack, and other problems. You and your doctor will talk about your risks of these problems based on your blood pressure. Your doctor will give you a goal for your blood pressure. Your goal will be based on your health and your age. Lifestyle changes, such as eating healthy and being active, are always important to help lower blood pressure. You might also take medicine to reach your blood pressure goal.  Follow-up care is a key part of your treatment and safety. Be sure to make and go to all appointments, and call your doctor if you are having problems. It's also a good idea to know your test results and keep a list of the medicines you take. How can you care for yourself at home? Medical treatment  · If you stop taking your medicine, your blood pressure will go back up. You may take one or more types of medicine to lower your blood pressure. Be safe with medicines. Take your medicine exactly as prescribed. Call your doctor if you think you are having a problem with your medicine. · Talk to your doctor before you start taking aspirin every day. Aspirin can help certain people lower their risk of a heart attack or stroke. But taking aspirin isn't right for everyone, because it can cause serious bleeding. · See your doctor regularly. You may need to see the doctor more often at first or until your blood pressure comes down. · If you are taking blood pressure medicine, talk to your doctor before you take decongestants or anti-inflammatory medicine, such as ibuprofen.  Some of these medicines can raise blood pressure. · Learn how to check your blood pressure at home. Lifestyle changes  · Stay at a healthy weight. This is especially important if you put on weight around the waist. Losing even 10 pounds can help you lower your blood pressure. · If your doctor recommends it, get more exercise. Walking is a good choice. Bit by bit, increase the amount you walk every day. Try for at least 30 minutes on most days of the week. You also may want to swim, bike, or do other activities. · Avoid or limit alcohol. Talk to your doctor about whether you can drink any alcohol. · Try to limit how much sodium you eat to less than 2,300 milligrams (mg) a day. Your doctor may ask you to try to eat less than 1,500 mg a day. · Eat plenty of fruits (such as bananas and oranges), vegetables, legumes, whole grains, and low-fat dairy products. · Lower the amount of saturated fat in your diet. Saturated fat is found in animal products such as milk, cheese, and meat. Limiting these foods may help you lose weight and also lower your risk for heart disease. · Do not smoke. Smoking increases your risk for heart attack and stroke. If you need help quitting, talk to your doctor about stop-smoking programs and medicines. These can increase your chances of quitting for good. When should you call for help? Call 911 anytime you think you may need emergency care. This may mean having symptoms that suggest that your blood pressure is causing a serious heart or blood vessel problem. Your blood pressure may be over 180/120.   For example, call 911 if:    · You have symptoms of a heart attack. These may include:  ? Chest pain or pressure, or a strange feeling in the chest.  ? Sweating. ? Shortness of breath. ? Nausea or vomiting. ? Pain, pressure, or a strange feeling in the back, neck, jaw, or upper belly or in one or both shoulders or arms. ? Lightheadedness or sudden weakness.   ? A fast or irregular heartbeat.     · You have symptoms of a stroke. These may include:  ? Sudden numbness, tingling, weakness, or loss of movement in your face, arm, or leg, especially on only one side of your body. ? Sudden vision changes. ? Sudden trouble speaking. ? Sudden confusion or trouble understanding simple statements. ? Sudden problems with walking or balance. ? A sudden, severe headache that is different from past headaches.     · You have severe back or belly pain.    Do not wait until your blood pressure comes down on its own. Get help right away.   Call your doctor now or seek immediate care if:    · Your blood pressure is much higher than normal (such as 180/120 or higher), but you don't have symptoms.     · You think high blood pressure is causing symptoms, such as:  ? Severe headache.  ? Blurry vision.    Watch closely for changes in your health, and be sure to contact your doctor if:    · Your blood pressure measures higher than your doctor recommends at least 2 times. That means the top number is higher or the bottom number is higher, or both.     · You think you may be having side effects from your blood pressure medicine. Where can you learn more? Go to http://neel-stone.info/. Enter N747 in the search box to learn more about \"High Blood Pressure: Care Instructions. \"  Current as of: July 22, 2018  Content Version: 11.9  © 7044-9785 [employer]. Care instructions adapted under license by [employer] (which disclaims liability or warranty for this information). If you have questions about a medical condition or this instruction, always ask your healthcare professional. [place] [address] any warranty or liability for your use of this information.

## 2019-02-25 NOTE — PROGRESS NOTES
PT DAILY TREATMENT NOTE 10-18    Patient Name: Norman Mueller  Date:2019  : 1955  [x]  Patient  Verified  Payor: [place] MEDICARE / Plan: [place] MEDICARE PART A & B / Product Type: Medicare /    In time:0800  Out time:0839  Total Treatment Time (min): 39  Visit #: 1 of 7    Medicare/BCBS Only   Total Timed Codes (min):  39 1:1 Treatment Time:  39       Treatment Area: Lumbar spine pain [M54.5]    SUBJECTIVE  Pain Level (0-10 scale): 0  Any medication changes, allergies to medications, adverse drug reactions, diagnosis change, or new procedure performed?: [x] No    [] Yes (see summary sheet for update)  Subjective functional status/changes:   [x] No changes reported  Doing good, no pain today    OBJECTIVE      25 min Therapeutic Exercise:  [] See flow sheet :   Rationale: increase ROM, increase strength and improve coordination to improve the patients ability to perform increased ADL    14 min Neuromuscular Re-education:  []  See flow sheet :   Rationale: increase ROM, increase strength and improve coordination  to improve the patients ability for core stability          With   [x] TE   [] TA   [] neuro   [] other: Patient Education: [x] Review HEP    [] Progressed/Changed HEP based on:   [] positioning   [] body mechanics   [] transfers   [] heat/ice application    [] other:      Other Objective/Functional Measures:   - Added L/S stability exercises  - FOTO = 63      Pain Level (0-10 scale) post treatment: 0    ASSESSMENT/Changes in Function:   - VCs required for Half Roll Stability  - Good overall tolerance with Lumbar stability training     Patient will continue to benefit from skilled PT services to modify and progress therapeutic interventions, address functional mobility deficits, address ROM deficits, address strength deficits, analyze and address soft tissue restrictions and analyze and cue movement patterns to attain remaining goals.      []  See Plan of Care  [x]  See progress note/recertification  []  See Discharge Summary         Progress towards goals / Updated goals:     Goals for this certification period to be accomplished in 10 treatments:  1. Patient will improve FOTO to at least 47 to assist with return to PLOF.                               PAVYMGR:  Ass at [address] 2/21/19  2. Pt will ambulate on TM for 1/2 mile with pian 2/10 or better in the low back to return to walking with little discomfort              PNStatus: TM ambulation for 1/4 mile              Current Status: Increased pace with 1/4 mile  3.   Pt will report the ability to sit for 30 min without reports af added back pain to tolerate driving for normal chores and shopping              PNStatus: Pain with driving              Current Status:         PLAN  [x]  Upgrade activities as tolerated     [x]  Continue plan of care  []  Update interventions per flow sheet       []  Discharge due to:_  []  Other:_      Jenny Hastings, PT 2/25/2019  8:39 AM    Future Appointments   Date Time Provider Missy Sami   2/25/2019  8:45 AM MD Rhoda Aleman   2/28/2019  9:00 AM Gil Rodriguez, PT [facility]   4/4/2019  8:00 AM MD Rhoda Aleman   4/17/2019  9:10 AM Saundra Farr  [address]

## 2019-02-25 NOTE — PROGRESS NOTES
Chief Complaint   Patient presents with    GERD    Ringing in Ear    Excessive Sweating    Chronic Kidney Disease     Pt in office for follow up. He has seen audiology. he is process of getting a hearing aid. He has also seen nephro for his CKD. 1. Have you been to the ER, urgent care clinic since your last visit? Hospitalized since your last visit? No    2. Have you seen or consulted any other health care providers outside of the [address] [place] [address] since your last visit? Include any pap smears or colon screening.  No

## 2019-02-28 ENCOUNTER — HOSPITAL ENCOUNTER (OUTPATIENT)
Dept: PHYSICAL THERAPY | Age: 64
Discharge: HOME | End: 2019-02-28
Payer: MEDICARE

## 2019-02-28 PROCEDURE — 97110 THERAPEUTIC EXERCISES: CPT

## 2019-02-28 PROCEDURE — 97112 NEUROMUSCULAR REEDUCATION: CPT

## 2019-02-28 NOTE — PROGRESS NOTES
PT DAILY TREATMENT NOTE 10-18    Patient Name: Shelia Number  Date:2019  : 1955  [x]  Patient  Verified  Payor: [place] MEDICARE / Plan: [place] MEDICARE PART A & B / Product Type: Medicare /    In time:908 Out time:940  Total Treatment Time (min): 43  Visit #: 3 of 10    Medicare/BCBS Only   Total Timed Codes (min):  43 1:1 Treatment Time:  38          Treatment Area: Lumbar spine pain [M54.5]    SUBJECTIVE  Pain Level (0-10 scale): 0  Any medication changes, allergies to medications, adverse drug reactions, diagnosis change, or new procedure performed?: [x] No    [] Yes (see summary sheet for update)  Subjective functional status/changes:   [] No changes reported  Doing good today    OBJECTIVE      16 min Therapeutic Exercise:  [x] See flow sheet :   Rationale: increase ROM, increase strength and improve coordination to improve the patients ability to Return to normal use of low back  23 min Neuromuscular Re-education:  [x]  See flow sheet :   Rationale: increase strength, improve coordination, improve balance and increase proprioception  to improve the patients ability to stabilize low back  4 min Therapeutic Activity:  [x]  See flow sheet :   Rationale: increase strength and improve coordination  to improve the patients ability to return to usual walking         With   [x] TE   [] TA   [] neuro   [] other: Patient Education: [x] Review HEP    [] Progressed/Changed HEP based on:   [] positioning   [] body mechanics   [] transfers   [] heat/ice application    [] other:      Other Objective/Functional Measures:        Pain Level (0-10 scale) post treatment: 0    ASSESSMENT/Changes in Function:   - Improved tolerance and stability with core stability exercises  - increased freedom of movement today    Patient will continue to benefit from skilled PT services to modify and progress therapeutic interventions, address functional mobility deficits, address ROM deficits, address strength deficits, analyze and address soft tissue restrictions and analyze and cue movement patterns to attain remaining goals. []  See Plan of Care  []  See progress note/recertification  []  See Discharge Summary         Progress towards goals / Updated goals:  Goals for this certification period to be accomplished in 10 treatments:  1. Patient will improve FOTO to at least 47 to assist with return to PLOF.                                 NKRGDDI:  Ass at [address] 2/21/19  2.  Pt will ambulate on TM for 1/2 mile with pian 2/10 or better in the low back to return to walking with little discomfort              PNStatus: TM ambulation for 1/4 mile              Current Status: Increased pace with 1/4 mile  3.  Pt will report the ability to sit for 30 min without reports af added back pain to tolerate driving for normal chores and shopping              PNStatus: Pain with driving              Current Status:      PLAN  [x]  Upgrade activities as tolerated     [x]  Continue plan of care  []  Update interventions per flow sheet       []  Discharge due to:_  []  Other:_      Janet Garcia, PT 2/28/2019  9:12 AM    Future Appointments   Date Time Provider Missy Godoy   3/1/2019 10:15 AM Hallie Hernandez MD [address]   4/4/2019  8:00 AM Kamryn Wiggins MD [facility]   4/17/2019  9:10 AM Joesph Hernández  [address]

## 2019-03-01 PROBLEM — N28.1 RENAL CYST: Status: ACTIVE | Noted: 2019-03-01

## 2019-03-01 PROBLEM — R97.20 ELEVATED PSA: Status: ACTIVE | Noted: 2019-03-01

## 2019-03-14 ENCOUNTER — APPOINTMENT (OUTPATIENT)
Dept: PHYSICAL THERAPY | Age: 64
End: 2019-03-14
Payer: MEDICARE

## 2019-03-18 ENCOUNTER — HOSPITAL ENCOUNTER (OUTPATIENT)
Dept: PHYSICAL THERAPY | Age: 64
Discharge: HOME | End: 2019-03-18
Payer: MEDICARE

## 2019-03-18 PROCEDURE — 97110 THERAPEUTIC EXERCISES: CPT

## 2019-03-18 PROCEDURE — 97112 NEUROMUSCULAR REEDUCATION: CPT

## 2019-03-18 NOTE — PROGRESS NOTES
PT DAILY TREATMENT NOTE 10-18    Patient Name: Siva Sutton  Date:3/18/2019  : 1955  [x]  Patient  Verified  Payor: [place] MEDICARE / Plan: [place] MEDICARE PART A & B / Product Type: Medicare /    In [id number] Out time: 0858  Total Treatment Time (min): 33  Visit #: 4 of 10    Medicare/BCBS Only   Total Timed Codes (min):  33 1:1 Treatment Time:  33          Treatment Area: Lumbar spine pain [M54.5]    SUBJECTIVE  Pain Level (0-10 scale): 0  Any medication changes, allergies to medications, adverse drug reactions, diagnosis change, or new procedure performed?: [x] No    [] Yes (see summary sheet for update)  Subjective functional status/changes:   [] No changes reported  Doing good today    OBJECTIVE      15 min Therapeutic Exercise:  [x] See flow sheet :   Rationale: increase ROM, increase strength and improve coordination to improve the patients ability to Return to normal use of low back  18 min Neuromuscular Re-education:  [x]  See flow sheet :   Rationale: increase strength, improve coordination, improve balance and increase proprioception  to improve the patients ability to stabilize low back          With   [x] TE   [] TA   [] neuro   [] other: Patient Education: [x] Review HEP    [] Progressed/Changed HEP based on:   [] positioning   [] body mechanics   [] transfers   [] heat/ice application    [] other:      Other Objective/Functional Measures:        Pain Level (0-10 scale) post treatment: 0    ASSESSMENT/Changes in Function:   - Improved tolerance and stability with core stability exercises  - increased freedom of movement today    Patient will continue to benefit from skilled PT services to modify and progress therapeutic interventions, address functional mobility deficits, address ROM deficits, address strength deficits, analyze and address soft tissue restrictions and analyze and cue movement patterns to attain remaining goals.      []  See Plan of Care  []  See progress note/recertification  [] See Discharge Summary         Progress towards goals / Updated goals:  Goals for this certification period to be accomplished in 10 treatments:  1. Patient will improve FOTO to at least 47 to assist with return to PLOF.                                 QGRZIXB:  76 2/25/19  2.  Pt will ambulate on TM for 1/2 mile with pian 2/10 or better in the low back to return to walking with little discomfort              PNStatus: TM ambulation for 1/4 mile              Current Status:Unable to perform due to ankle pain 3/18/19  3.  Pt will report the ability to sit for 30 min without reports af added back pain to tolerate driving for normal chores and shopping              PNStatus: Pain with driving              Current Status:      PLAN  [x]  Upgrade activities as tolerated     [x]  Continue plan of care  []  Update interventions per flow sheet       []  Discharge due to:_  []  Other:_      Roscoe Santiago PT 3/18/2019  8:37 AM    Future Appointments   Date Time Provider Missy Godoy   3/20/2019  8:30 AM Behzad Lau PT [facility]   3/25/2019  8:30 AM Behzad Lau PT [facility]   3/27/2019  8:30 AM Behzad Lau PT [facility]   4/1/2019  8:30 AM Behzad Lau, PT [facility]   4/4/2019  8:00 AM MD Rhoda Starr   4/11/2019  2:00 PM MD Basia Payne   4/17/2019  9:10 AM AMRIT Clemente   3/2/2020  8:30 AM MD Basia Payne

## 2019-03-20 ENCOUNTER — HOSPITAL ENCOUNTER (OUTPATIENT)
Dept: PHYSICAL THERAPY | Age: 64
Discharge: HOME | End: 2019-03-20
Payer: MEDICARE

## 2019-03-20 PROCEDURE — 97140 MANUAL THERAPY 1/> REGIONS: CPT

## 2019-03-20 NOTE — PROGRESS NOTES
PT DAILY TREATMENT NOTE 10-18    Patient Name: Tank Fam  Date:3/20/2019  : 1955  [x]  Patient  Verified  [place]or: [place] MED[place]ARE / Plan: [place] MEDICARE PART A & B / Product Type: Medicare /    In [id number] Out time: 917  Total Treatment Time (min): 42  Visit #: 5 of 10    Medicare/BCBS Only   Total Timed Codes (min):  42 1:1 Treatment Time:  10          Treatment Area: Lumbar spine pain [M54.5]    SUBJECTIVE  Pain Level (0-10 scale): 0  Any medication changes, allergies to medications, adverse drug reactions, diagnosis change, or new procedure performed?: [x] No    [] Yes (see summary sheet for update)  Subjective functional status/changes:   [] No changes reported  Doing good today    OBJECTIVE      14 min Therapeutic Exercise:  [x] See flow sheet :   Rationale: increase ROM, increase strength and improve coordination to improve the patients ability to Return to normal use of low back  18 min Neuromuscular Re-education:  [x]  See flow sheet :   Rationale: increase strength, improve coordination, improve balance and increase proprioception  to improve the patients ability to stabilize low back  10 min Manual Therapy:  Inf glide right sacral base, (B) Innominate p/a mobs, (B) LE LAD, (B) L/S and T/S Rot mobs     Rationale: decrease pain, increase ROM and increase tissue extensibility to return to normal           With   [x] TE   [] TA   [] neuro   [] other: Patient Education: [x] Review HEP    [] Progressed/Changed HEP based on:   [] positioning   [] body mechanics   [] transfers   [] heat/ice application    [] other:      Other Objective/Functional Measures:  - Decreased mobility left innominate in stork stance         Pain Level (0-10 scale) post treatment: 0    ASSESSMENT/Changes in Function:   - Improved tolerance and stability with core stability exercises      Patient will continue to benefit from skilled PT services to modify and progress therapeutic interventions, address functional mobility deficits, address ROM deficits, address strength deficits, analyze and address soft tissue restrictions and analyze and cue movement patterns to attain remaining goals. []  See Plan of Care  []  See progress note/recertification  []  See Discharge Summary         Progress towards goals / Updated goals:  Goals for this certification period to be accomplished in 10 treatments:  1. Patient will improve FOTO to at least 47 to assist with return to PLOF.                                 DPIMKIV:  61 2/25/19  2.  Pt will ambulate on TM for 1/2 mile with pian 2/10 or better in the low back to return to walking with little discomfort              PNStatus: TM ambulation for 1/4 mile              Current Status:Unable to perform due to ankle pain 3/18/19  3.  Pt will report the ability to sit for 30 min without reports af added back pain to tolerate driving for normal chores and shopping              PNStatus: Pain with driving              Current Status:      PLAN  [x]  Upgrade activities as tolerated     [x]  Continue plan of care  []  Update interventions per flow sheet       []  Discharge due to:_  []  Other:_      Eyad Taylor PT 3/20/2019  8:37 AM    Future Appointments   Date Time Provider Missy Godoy   3/25/2019  8:30 AM Natacha Jason, PT [facility]   3/27/2019  8:30 AM Natacha Jason PT [facility]   4/1/2019  8:30 AM Natacha Jason PT [facility]   4/4/2019  8:00 AM MD Rhoda Hernandez   4/11/2019  2:00 PM MD Basia Chew   4/17/2019  9:10 AM AMRIT Baez   3/2/2020  8:30 AM MD Basia Chew

## 2019-03-25 ENCOUNTER — APPOINTMENT (OUTPATIENT)
Dept: PHYSICAL THERAPY | Age: 64
End: 2019-03-25
Payer: MEDICARE

## 2019-03-26 ENCOUNTER — HOSPITAL ENCOUNTER (OUTPATIENT)
Dept: PHYSICAL THERAPY | Age: 64
Discharge: HOME | End: 2019-03-26
Payer: MEDICARE

## 2019-03-26 PROCEDURE — 97110 THERAPEUTIC EXERCISES: CPT
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
## 2019-03-27 ENCOUNTER — APPOINTMENT (OUTPATIENT)
Dept: PHYSICAL THERAPY | Age: 64
End: 2019-03-27
Payer: MEDICARE

## 2019-04-04 ENCOUNTER — OFFICE VISIT (OUTPATIENT)
Dept: FAMILY MEDICINE CLINIC | Age: 64
End: 2019-04-04

## 2019-04-04 VITALS
WEIGHT: 272 LBS | SYSTOLIC BLOOD PRESSURE: 108 MMHG | HEART RATE: 89 BPM | DIASTOLIC BLOOD PRESSURE: 60 MMHG | BODY MASS INDEX: 40.29 KG/M2 | TEMPERATURE: 98.4 F | HEIGHT: 69 IN | RESPIRATION RATE: 14 BRPM | OXYGEN SATURATION: 95 %

## 2019-04-04 DIAGNOSIS — M19.90 ARTHRITIS: Primary | ICD-10-CM

## 2019-04-04 DIAGNOSIS — L72.9 SKIN CYST: ICD-10-CM

## 2019-04-04 DIAGNOSIS — I10 ESSENTIAL HYPERTENSION: ICD-10-CM

## 2019-04-04 DIAGNOSIS — E55.9 VITAMIN D DEFICIENCY: ICD-10-CM

## 2019-04-04 NOTE — PATIENT INSTRUCTIONS
High Blood Pressure: Care Instructions  Overview    It's normal for blood pressure to go up and down throughout the day. But if it stays up, you have high blood pressure. Another name for high blood pressure is hypertension. Despite what a lot of people think, high blood pressure usually doesn't cause headaches or make you feel dizzy or lightheaded. It usually has no symptoms. But it does increase your risk of stroke, heart attack, and other problems. You and your doctor will talk about your risks of these problems based on your blood pressure. Your doctor will give you a goal for your blood pressure. Your goal will be based on your health and your age. Lifestyle changes, such as eating healthy and being active, are always important to help lower blood pressure. You might also take medicine to reach your blood pressure goal.  Follow-up care is a key part of your treatment and safety. Be sure to make and go to all appointments, and call your doctor if you are having problems. It's also a good idea to know your test results and keep a list of the medicines you take. How can you care for yourself at home? Medical treatment  · If you stop taking your medicine, your blood pressure will go back up. You may take one or more types of medicine to lower your blood pressure. Be safe with medicines. Take your medicine exactly as prescribed. Call your doctor if you think you are having a problem with your medicine. · Talk to your doctor before you start taking aspirin every day. Aspirin can help certain people lower their risk of a heart attack or stroke. But taking aspirin isn't right for everyone, because it can cause serious bleeding. · See your doctor regularly. You may need to see the doctor more often at first or until your blood pressure comes down. · If you are taking blood pressure medicine, talk to your doctor before you take decongestants or anti-inflammatory medicine, such as ibuprofen.  Some of these medicines can raise blood pressure. · Learn how to check your blood pressure at home. Lifestyle changes  · Stay at a healthy weight. This is especially important if you put on weight around the waist. Losing even 10 pounds can help you lower your blood pressure. · If your doctor recommends it, get more exercise. Walking is a good choice. Bit by bit, increase the amount you walk every day. Try for at least 30 minutes on most days of the week. You also may want to swim, bike, or do other activities. · Avoid or limit alcohol. Talk to your doctor about whether you can drink any alcohol. · Try to limit how much sodium you eat to less than 2,300 milligrams (mg) a day. Your doctor may ask you to try to eat less than 1,500 mg a day. · Eat plenty of fruits (such as bananas and oranges), vegetables, legumes, whole grains, and low-fat dairy products. · Lower the amount of saturated fat in your diet. Saturated fat is found in animal products such as milk, cheese, and meat. Limiting these foods may help you lose weight and also lower your risk for heart disease. · Do not smoke. Smoking increases your risk for heart attack and stroke. If you need help quitting, talk to your doctor about stop-smoking programs and medicines. These can increase your chances of quitting for good. When should you call for help? Call 911 anytime you think you may need emergency care. This may mean having symptoms that suggest that your blood pressure is causing a serious heart or blood vessel problem. Your blood pressure may be over 180/120.   For example, call 911 if:    · You have symptoms of a heart attack. These may include:  ? Chest pain or pressure, or a strange feeling in the chest.  ? Sweating. ? Shortness of breath. ? Nausea or vomiting. ? Pain, pressure, or a strange feeling in the back, neck, jaw, or upper belly or in one or both shoulders or arms. ? Lightheadedness or sudden weakness.   ? A fast or irregular heartbeat.     · You have symptoms of a stroke. These may include:  ? Sudden numbness, tingling, weakness, or loss of movement in your face, arm, or leg, especially on only one side of your body. ? Sudden vision changes. ? Sudden trouble speaking. ? Sudden confusion or trouble understanding simple statements. ? Sudden problems with walking or balance. ? A sudden, severe headache that is different from past headaches.     · You have severe back or belly pain.    Do not wait until your blood pressure comes down on its own. Get help right away.   Call your doctor now or seek immediate care if:    · Your blood pressure is much higher than normal (such as 180/120 or higher), but you don't have symptoms.     · You think high blood pressure is causing symptoms, such as:  ? Severe headache.  ? Blurry vision.    Watch closely for changes in your health, and be sure to contact your doctor if:    · Your blood pressure measures higher than your doctor recommends at least 2 times. That means the top number is higher or the bottom number is higher, or both.     · You think you may be having side effects from your blood pressure medicine. Where can you learn more? Go to http://neel-stone.info/. Enter X954 in the search box to learn more about \"High Blood Pressure: Care Instructions. \"  Current as of: July 22, 2018  Content Version: 11.9  © 5384-1668 [employer]. Care instructions adapted under license by [employer] (which disclaims liability or warranty for this information). If you have questions about a medical condition or this instruction, always ask your healthcare professional. [place] [address] any warranty or liability for your use of this information.
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
## 2019-04-04 NOTE — PROGRESS NOTES
Patient here for 6 month f/u. He has concerned with a mass on left fore arm that moves. Denies any pain. 1. Have you been to the ER, urgent care clinic since your last visit? Hospitalized since your last visit? No  2. Have you seen or consulted any other health care providers outside of the [address] [place] [address] since your last visit? Include any pap smears or colon screening. No    Medication reconciliation has been completed with patient. Care team discussed/updated as well as pharmacy.     Health Maintenance reviewed - Will discuss need for Shingrix with provider

## 2019-04-30 ENCOUNTER — OFFICE VISIT (OUTPATIENT)
Dept: ORTHOPEDIC SURGERY | Age: 64
End: 2019-04-30

## 2019-04-30 DIAGNOSIS — M47.816 LUMBAR FACET ARTHROPATHY: ICD-10-CM

## 2019-04-30 DIAGNOSIS — M51.37 DEGENERATIVE DISC DISEASE AT L5-S1 LEVEL: ICD-10-CM

## 2019-04-30 DIAGNOSIS — Z98.890 S/P LUMBAR LAMINECTOMY: Primary | ICD-10-CM

## 2019-05-01 VITALS
HEIGHT: 69 IN | RESPIRATION RATE: 17 BRPM | DIASTOLIC BLOOD PRESSURE: 80 MMHG | BODY MASS INDEX: 39.66 KG/M2 | SYSTOLIC BLOOD PRESSURE: 129 MMHG | TEMPERATURE: 97.3 F | WEIGHT: 267.8 LBS | HEART RATE: 93 BPM | OXYGEN SATURATION: 96 %
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
## 2019-12-24 ENCOUNTER — OFFICE VISIT (OUTPATIENT)
Dept: FAMILY MEDICINE CLINIC | Age: 64
End: 2019-12-24

## 2019-12-24 VITALS
DIASTOLIC BLOOD PRESSURE: 82 MMHG | SYSTOLIC BLOOD PRESSURE: 118 MMHG | RESPIRATION RATE: 14 BRPM | HEART RATE: 87 BPM | OXYGEN SATURATION: 97 % | BODY MASS INDEX: 39.43 KG/M2 | TEMPERATURE: 98.1 F | WEIGHT: 266.2 LBS | HEIGHT: 69 IN

## 2019-12-24 DIAGNOSIS — I10 ESSENTIAL HYPERTENSION: Primary | ICD-10-CM

## 2019-12-24 DIAGNOSIS — G89.29 CHRONIC PAIN OF RIGHT KNEE: ICD-10-CM

## 2019-12-24 DIAGNOSIS — Z00.00 MEDICARE ANNUAL WELLNESS VISIT, SUBSEQUENT: ICD-10-CM

## 2019-12-24 DIAGNOSIS — Z71.89 ACP (ADVANCE CARE PLANNING): ICD-10-CM

## 2019-12-24 DIAGNOSIS — M25.561 CHRONIC PAIN OF RIGHT KNEE: ICD-10-CM

## 2019-12-24 NOTE — PROGRESS NOTES
Patient being seen today for his [address]    1. Have you been to the ER, urgent care clinic since your last visit? Hospitalized since your last visit? Yes When: 11/2019 [facility] Reason for visit: vertigo  2. Have you seen or consulted any other health care providers outside of the [address] [place] [address] since your last visit? Include any pap smears or colon screening. No    Medication reconciliation has been completed with patient. Care team discussed/updated as well as pharmacy. Health Maintenance Due   Topic Date Due    Shingrix Vaccine Age 49> (1 of 2) 05/24/2005    Influenza Age 5 to Adult  08/01/2019    MEDICARE YEARLY EXAM  12/25/2019     Health Maintenance reviewed - MWV today.

## 2019-12-24 NOTE — ACP (ADVANCE CARE PLANNING)
Advance Care Planning (ACP) Provider Conversation Snapshot    Date of ACP Conversation: 12/24/19  Persons included in Conversation:  patient  Length of ACP Conversation in minutes:  16 minutes    Authorized Decision Maker (if patient is incapable of making informed decisions):    This person is:   Healthcare Agent/Medical Power of  under Advance Directive            For Patients with Decision Making Capacity:   Values/Goals: Exploration of values, goals, and preferences if recovery is not expected, even with continued medical treatment in the event of:  Imminent death  Severe, permanent brain injury    Conversation Outcomes / Follow-Up Plan:   Recommended completion of Advance Directive form after review of ACP materials and conversation with prospective healthcare agent
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
## 2019-12-24 NOTE — PATIENT INSTRUCTIONS
Medicare Wellness Visit, Male    The best way to live healthy is to have a lifestyle where you eat a well-balanced diet, exercise regularly, limit alcohol use, and quit all forms of tobacco/nicotine, if applicable. Regular preventive services are another way to keep healthy. Preventive services (vaccines, screening tests, monitoring & exams) can help personalize your care plan, which helps you manage your own care. Screening tests can find health problems at the earliest stages, when they are easiest to treat. [facility] follows the current, evidence-based guidelines published by the [facility] ([facility]) when recommending preventive services for our patients. Because we follow these guidelines, sometimes recommendations change over time as research supports it. (For example, a prostate screening blood test is no longer routinely recommended for men with no symptoms). Of course, you and your doctor may decide to screen more often for some diseases, based on your risk and co-morbidities (chronic disease you are already diagnosed with). Preventive services for you include:  - Medicare offers their members a free annual wellness visit, which is time for you and your primary care provider to discuss and plan for your preventive service needs. Take advantage of this benefit every year!  -All adults over age 72 should receive the recommended pneumonia vaccines. Current USPSTF guidelines recommend a series of two vaccines for the best pneumonia protection.   -All adults should have a flu vaccine yearly and tetanus vaccine every 10 years.  -All adults age 48 and older should receive the shingles vaccines (series of two vaccines).        -All adults age 38-68 who are overweight should have a diabetes screening test once every three years.   -Other screening tests & preventive services for persons with diabetes include: an eye exam to screen for diabetic retinopathy, a kidney function test, a foot exam, and stricter control over your cholesterol.   -Cardiovascular screening for adults with routine risk involves an electrocardiogram (ECG) at intervals determined by the provider.   -Colorectal cancer screening should be done for adults age 54-65 with no increased risk factors for colorectal cancer. There are a number of acceptable methods of screening for this type of cancer. Each test has its own benefits and drawbacks. Discuss with your provider what is most appropriate for you during your annual wellness visit. The different tests include: colonoscopy (considered the best screening method), a fecal occult blood test, a fecal DNA test, and sigmoidoscopy.  -All adults born between [facility] should be screened once for Hepatitis C.  -An Abdominal Aortic Aneurysm (AAA) Screening is recommended for men age 73-68 who has ever smoked in their lifetime. Here is a list of your current Health Maintenance items (your personalized list of preventive services) with a due date:  Health Maintenance Due   Topic Date Due    Shingles Vaccine (1 of 2) 05/24/2005    Flu Vaccine  08/01/2019    Annual Well Visit  12/25/2019        Advance Directives: Care Instructions  Your Care Instructions  An advance directive is a legal way to state your wishes at the end of your life. It tells your family and your doctor what to do if you can no longer say what you want. There are two main types of advance directives. You can change them any time that your wishes change. · A living will tells your family and your doctor your wishes about life support and other treatment. · A durable power of  for health care lets you name a person to make treatment decisions for you when you can't speak for yourself. This person is called a health care agent.   If you do not have an advance directive, decisions about your medical care may be made by a doctor or a  who doesn't know you.  It may help to think of an advance directive as a gift to the people who care for you. If you have one, they won't have to make tough decisions by themselves. Follow-up care is a key part of your treatment and safety. Be sure to make and go to all appointments, and call your doctor if you are having problems. It's also a good idea to know your test results and keep a list of the medicines you take. How can you care for yourself at home? · Discuss your wishes with your loved ones and your doctor. This way, there are no surprises. · Many states have a unique form. Or you might use a universal form that has been approved by many states. This kind of form can sometimes be completed and stored online. Your electronic copy will then be available wherever you have a connection to the Internet. In most cases, doctors will respect your wishes even if you have a form from a different state. · You don't need a  to do an advance directive. But you may want to get legal advice. · Think about these questions when you prepare an advance directive:  ? Who do you want to make decisions about your medical care if you are not able to? Many people choose a family member or close friend. ? Do you know enough about life support methods that might be used? If not, talk to your doctor so you understand. ? What are you most afraid of that might happen? You might be afraid of having pain, losing your independence, or being kept alive by machines. ? Where would you prefer to die? Choices include your home, a hospital, or a nursing home. ? Would you like to have information about hospice care to support you and your family? ? Do you want to donate organs when you die? ? Do you want certain [employer] practices performed before you die? If so, put your wishes in the advance directive. · Read your advance directive every year, and make changes as needed. When should you call for help?   Be sure to contact your doctor if you have any questions. Where can you learn more? Go to http://neel-stone.info/. Enter R264 in the search box to learn more about \"Advance Directives: Care Instructions. \"  Current as of: April 1, 2019  Content Version: 12.2  © 3415-1923 [employer]. Care instructions adapted under license by [employer] (which disclaims liability or warranty for this information). If you have questions about a medical condition or this instruction, always ask your healthcare professional. [address] any warranty or liability for your use of this information.

## 2020-02-24 ENCOUNTER — OFFICE VISIT (OUTPATIENT)
Dept: FAMILY MEDICINE CLINIC | Age: 65
End: 2020-02-24

## 2020-02-24 VITALS
OXYGEN SATURATION: 98 % | WEIGHT: 271 LBS | DIASTOLIC BLOOD PRESSURE: 87 MMHG | RESPIRATION RATE: 13 BRPM | HEIGHT: 69 IN | SYSTOLIC BLOOD PRESSURE: 138 MMHG | BODY MASS INDEX: 40.14 KG/M2 | HEART RATE: 90 BPM

## 2020-02-24 DIAGNOSIS — F43.10 PTSD (POST-TRAUMATIC STRESS DISORDER): Primary | ICD-10-CM

## 2020-02-24 DIAGNOSIS — G62.9 NEUROPATHY: ICD-10-CM

## 2020-02-24 RX ORDER — GABAPENTIN 600 MG/1
TABLET ORAL
COMMUNITY
Start: 2020-01-17

## 2020-02-24 NOTE — PATIENT INSTRUCTIONS
Neuropathic Pain: Care Instructions  Your Care Instructions    Neuropathic pain is caused by pressure on or damage to your nerves. It's often simply called nerve pain. Some people feel this type of pain all the time. For others, it comes and goes. Diabetes, shingles, or an injury can cause nerve pain. Many people say the pain feels sharp, burning, or stabbing. But some people feel it as a dull ache. In some cases, it makes your skin very sensitive. So touch, pressure, and other sensations that did not hurt before may now cause pain. It's important to know that this kind of pain is real and can affect your quality of life. It's also important to know that treatment can help. Treatment includes pain medicines, exercise, and physical therapy. Medicines can help reduce the number of pain signals that travel over the nerves. This can make the painful areas less sensitive. It can also help you sleep better and improve your mood. But medicines are only one part of successful treatment. Most people do best with more than one kind of treatment. Your doctor may recommend that you try cognitive-behavioral therapy and stress management. Or, if needed, you may decide to try to quit smoking, lower your blood pressure, or better control blood sugar. These kinds of healthy changes can also make a difference. If you feel that your treatment is not working, talk to your doctor. And be sure to tell your doctor if you think you might be depressed or anxious. These are common problems that can also be treated. Follow-up care is a key part of your treatment and safety. Be sure to make and go to all appointments, and call your doctor if you are having problems. It's also a good idea to know your test results and keep a list of the medicines you take. How can you care for yourself at home? · Be safe with medicines. Read and follow all instructions on the label.   ? If the doctor gave you a prescription medicine for pain, take it as prescribed. ? If you are not taking a prescription pain medicine, ask your doctor if you can take an over-the-counter medicine. · Save hard tasks for days when you have less pain. Follow a hard task with an easy task. And remember to take breaks. · Relax, and reduce stress. You may want to try deep breathing or meditation. These can help. · Keep moving. Gentle, daily exercise can help reduce pain. Your doctor or physical therapist can tell you what type of exercise is best for you. This may include walking, swimming, and stationary biking. It may also include stretches and range-of-motion exercises. · Try heat, cold packs, and massage. · Get enough sleep. Constant pain can make you more tired. If the pain makes it hard to sleep, talk with your doctor. · Think positively. Your thoughts can affect your pain. Do fun things to distract yourself from the pain. See a movie, read a book, listen to music, or spend time with a friend. · Keep a pain diary. Try to write down how strong your pain is and what it feels like. Also try to notice and write down how your moods, thoughts, sleep, activities, and medicine affect your pain. These notes can help you and your doctor find the best ways to treat your pain. Reducing constipation caused by pain medicine  Pain medicines often cause constipation. To reduce constipation:  · Include fruits, vegetables, beans, and whole grains in your diet each day. These foods are high in fiber. · Drink plenty of fluids, enough so that your urine is light yellow or clear like water. If you have kidney, heart, or liver disease and have to limit fluids, talk with your doctor before you increase the amount of fluids you drink. · Get some exercise every day. Build up slowly to 30 to 60 minutes a day on 5 or more days of the week. · Take a fiber supplement, such as Citrucel or Metamucil, every day if needed. Read and follow all instructions on the label.   · Schedule time each day for a bowel movement. Having a daily routine may help. Take your time and do not strain when having a bowel movement. · Ask your doctor about a laxative. The goal is to have one easy bowel movement every 1 to 2 days. Do not let constipation go untreated for more than 3 days. When should you call for help? Call your doctor now or seek immediate medical care if:    · You feel sad, anxious, or hopeless for more than a few days. This could mean you are depressed. Depression is common in people who have a lot of pain. But it can be treated.     · You have trouble with bowel movements, such as:  ? No bowel movement in 3 days. ? Blood in the anal area, in your stool, or on the toilet paper. ? Diarrhea for more than 24 hours.    Watch closely for changes in your health, and be sure to contact your doctor if:    · Your pain is getting worse.     · You can't sleep because of pain.     · You are very worried or anxious about your pain.     · You have trouble taking your pain medicine.     · You have any concerns about your pain medicine or its side effects.     · You have vomiting or cramps for more than 2 hours. Where can you learn more? Go to http://neel-stone.info/. Enter Q800 in the search box to learn more about \"Neuropathic Pain: Care Instructions. \"  Current as of: March 28, 2019  Content Version: 12.2  © 2182-1871 [employer]. Care instructions adapted under license by [employer] (which disclaims liability or warranty for this information). If you have questions about a medical condition or this instruction, always ask your healthcare professional. [place] [address] any warranty or liability for your use of this information.
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
## 2020-02-24 NOTE — PROGRESS NOTES
Pt is follow up from PT referral. Was given gabapentin for nerve pain by podiatry. 1. Have you been to the ER, urgent care clinic since your last visit? Hospitalized since your last visit? No    2. Have you seen or consulted any other health care providers outside of the [address] [place] [address] since your last visit? Include any pap smears or colon screening.  No

## 2020-10-06 NOTE — PROGRESS NOTES
To achieve and maintain your maximum potential bone strength/density you need adequate calcium and vitamin D as well as regular weight bearing exercise and resistance exercise.  Bone density continues to increase until around age 30 when it reaches its peak. At this  time you enter the \"maintenance\" phase of your bone density which lasts through menopause.  Without the right nutrients, there is potential for bone loss during this time leading to osteopenia - the precursor to osteoporosis.      Here are things to avoid to help prevent osteopenia / osteoporosis:  · Alcohol is toxic to bones. It is a major cause of bone loss. Heavy drinking can cause osteoporosis even if you have no other risk factors.  · Smoking reduces bone mass. Smoking may also interfere with estrogen levels and cause early menopause.  · Inactivity makes your bones lose strength and become thinner. Over time, thin bones may break. Women who aren't active are at a high risk for osteoporosis.  · Certain medicines, such as cortisone, increase bone loss. They also decrease bone growth. Ask your healthcare provider about any side effects of your medicines, and how to prevent them.  · Protein-rich or salty foods eaten in large amounts may deplete calcium.  · Caffeine increases calcium loss. People who drink a lot of coffee, tea, or alessandra lose more calcium than those who don't.      Calcium intake should be 1200 mg daily (this is 100% when reading nutrition labels), from all sources (food/diet + supplements). IF supplements are needed they should be taken with food for better absorption and not exceed 500 mg per dose (not effectively absorbed above this level; divided doses throughout the day provide better absorption and delivers calcium more effectively in the body).    Vitamin D 8027-3333 IU daily for most people, some people need more. If you have blood work to determine your level, your recommended dose will be based on this.    Lab Results  PT DAILY TREATMENT NOTE 10-18    Patient Name: Laith Coburn  Date:2019  : 1955  [x]  Patient  Verified  Payor: Alfonzo Jang / Plan: [facility] [place] [place] & B / Product Type: Medicare /    In time:930 Out time: 1016  Total Treatment Time (min): 46  Visit #: 8 of 16    Medicare/BCBS Only   Total Timed Codes (min):  46 1:1 Treatment Time:  40       Treatment Area: Lumbar spine pain [M54.5]    SUBJECTIVE  Pain Level (0-10 scale): 2  Any medication changes, allergies to medications, adverse drug reactions, diagnosis change, or new procedure performed?: [x] No    [] Yes (see summary sheet for update)  Subjective functional status/changes:   [] No changes reported  Ankle is hurting worse today. No pain in the back but have a little pain in the left side.     OBJECTIVE      30 min Therapeutic Exercise:  [x] See flow sheet :   Rationale: increase ROM, increase strength and improve coordination to improve the patients ability to tolerate increased activity levels  16 min Therapeutic Activity:  [x]  See flow sheet :   Rationale: increase ROM, increase strength and improve coordination  to improve the patients ability to improve activity tolerance                With   [x] TE   [] TA   [] neuro   [] other: Patient Education: [x] Review HEP    [] Progressed/Changed HEP based on:   [] positioning   [] body mechanics   [] transfers   [] heat/ice application    [] other:      Other Objective/Functional Measures:      Pain Level (0-10 scale) post treatment: 2    ASSESSMENT/Changes in Function:   - Improved exercise participation today  - Pt responded well to treatment with improved pelvic alignment and mobility  - Increased Left Antalgic gait  - Pt showing increased endurance with increased time on bike  - No TM today due to Left ankle pain        Patient will continue to benefit from skilled PT services to modify and progress therapeutic interventions, address functional mobility deficits, address ROM deficits,   Component Value Date    VITD25 27.6 (L) 11/19/2019    VITD25 20.3 (L) 07/11/2019         Exercise - anything that increases your heart rate and breathing rate. Goal is to get 150-300 minutes per week of moderate intensity exercise (spread over days). Strength/muscle building exercise/activities are recommended on 2 days a week - be sure to incorporate all muscle groups (arms, legs, back, hips, shoulders, abdomen).   Moderate intensity is anything that increases your heart rate, increases your respiratory rate and makes you sweat just a little.    Diet - eat more \"whole\" foods and less processed and refined foods. Limit late night snacking.  Aim for a goal of 25-35 grams of fiber daily. Drink enough fluids to keep your urine pale.     Sleep - get enough so you do not feel like you need to nap in the day. Don't get in the habit of pressing \"snooze\" on your alarm clock more than once. Keep a regular sleep/wake cycle during the entire week, try not to vary by more than 1-2 hours - this helps to keep your body in a healthy rhythm.    Healthy weight - Being overweight increases risk of high blood pressure, high cholesterol, diabetes, joint problems, certain cancers and fertility issues. Being underweight increases risk of immune and nutritional deficiencies, osteopenia/osteoporosis and fertility issues.    Underweight BMI <18.5  Normal BMI 18.5-24.9  Overweight BMI 25-29.9  Obesity BMI 30 and above  YOUR BMI TODAY: Body mass index is 2.78 kg/m².  Wt Readings from Last 2 Encounters:   10/06/20 6.804 kg   11/19/19 67.7 kg         IDEAL blood pressure is LESS THAN 130/80.   Your last blood pressure readings:  BP Readings from Last 5 Encounters:   10/06/20 110/80   11/19/19 108/72   10/01/19 110/70   07/11/19 118/82   11/08/18 126/80       Screening cholesterol levels - this should be done periodically starting by age 25 (earlier if there is family history). Your last cholesterol panel:  Lab Results   Component Value Date  address strength deficits, analyze and address soft tissue restrictions and analyze and cue movement patterns to attain remaining goals. []  See Plan of Care  []  See progress note/recertification  []  See Discharge Summary         Progress towards goals / Updated goals:  Short Term Goals: To be accomplished in 3 weeks:              1. Patient will be ind and compliant with HEP 1-2x/day to increase ease with ADLs. Current: Reports initiation of HEP 1/24/19              2. Patient will be able to perform 5 bridges with buttock clearance with pain no more then 1/10 to increase ease with bed mobility. Current:  Met 1/29/19  Long Term Goals: To be accomplished in 8 weeks:              1. Patient will improve FOTO to at least 47 to assist with return to PLOF. 2. Patient will be able to ambulate 200 feet in clinic with [facility] with low back pain no more then 2/10 to increase ease with home navigation. Cuttent:  Met with TM ambulation for 1/4 mile 2/1/19               3. Patient will report being able to sit for 20 minutes to increase ease with driving. Current: Met for 20 min but c/o pain at 30min 2/14/19              4. Patient will improve left hip abd and hip extension strength to 3+/5 to increase safety with transfers.                                   Current:  Met MMT (B) Hip ABD 5-/5 Ext Right 5 Left 4+ 2/13/19          PLAN  [x]  Upgrade activities as tolerated     [x]  Continue plan of care  []  Update interventions per flow sheet       []  Discharge due to:_  []  Other:_      Feliciano Dunn PT 2/14/2019  9:36 AM    Future Appointments   Date Time Provider Missy Godoy   2/19/2019  9:00 AM Ksenia Gooden PT [facility]   2/21/2019  9:00 AM Ksenia Gooden PT [facility]   2/25/2019  8:00 AM Ksenia Gooden PT [facility]   2/25/2019  8:45 AM MD Rhoda Alvarenga    CHOLESTEROL 147 11/08/2018    CHOLESTEROL 183 11/14/2017    HDL 72 11/08/2018    HDL 90 11/14/2017    CALCLDL 69 11/08/2018    CALCLDL 88 11/14/2017    TRIGLYCERIDE 29 11/08/2018    TRIGLYCERIDE 26 11/14/2017       Screening for diabetes - this should be done periodically (every 1-3 years) based on your risk factors.  Normal fasting blood sugar level is <100. Normal hemoglobin A1c is <5.7%, pre diabetes is 5.7-6.4% and diabetes is >6.4%.  Your last fasting blood sugar level and/or hemoglobin A1c:  Fasting Status   Date Value Ref Range Status   11/19/2019 0 hrs Final     Glucose   Date Value Ref Range Status   11/19/2019 86 65 - 99 mg/dL Final     No results found for: HGBA1C      If you are a woman of child bearing age, it is recommended that you take a folic acid supplement (400-800 mcg daily) to prevent certain birth defects (neural tube defects). This can be found in a multivitamin / prenatal vitamin or can be taken as a stand-alone supplement.    Cervical cancer screening with a pap smear should occur every 3-5 years depending on your age. HPV screening can extend time between pap smears after the age of 30. If you have abnormalities on your pap smear or HPV is detected, you may need more frequent pap smears, a colposcopy (a more detailed exam of your cervix with possible biopsies taken) or a procedure to remove the abnormal cells.     Routine STD screening is recommended for:  - all young adults who are sexually active  - all adults who are not in a mutually monogamous long term relationship  - all adults who have a new partner since last screening  - at the beginning of each pregnancy    STD screening (meaning no symptoms are present) can include gonorrhea, chlamydia, trichomonas, hepatitis, HIV, syphilis, HPV. If you are having symptoms or have known exposures, other testing may be performed as indicated.   Gonorrhea and chlamydia are the two most common STDs. In women they can largely persist without any  2/28/2019  9:00 AM Gustavo Tang, PT [facility]   4/4/2019  8:00 AM Yaneth Amado MD [facility]   4/17/2019  9:10 AM Terrance Denson  [address]  symptoms, leading to pelvic inflammatory disease (PID), infertility, pregnancy complications, and/or chronic pelvic pain. For men, these infections are less likely to be asymptomatic and also less likely to have long term consequences for men if they go untreated.  Testing for gonorrhea and chlamydia can be done via urine sampling (preferred for men over urethral swab) or cervical swab for women (preferred over a urine sample as it is not as sensitive at detection in women).    Breast cancer screening should start between the ages of 40-45 for women at average risk. Bcrisktool.cancer.gov can help determine your risk of developing breast cancer. Overall, breast cancer affects 1 in 8 women over a lifetime.   Screening mammograms for average risk women under the age of 54 should be done yearly, after this changing to every other year can be a consideration. 3D enhanced mammography can help improve the accuracy of findings especially in women with dense tissues.      Breast Health: Breast Self-Awareness  Breast self-awareness is knowing how your breasts normally look and feel. Your breasts change as you go through different stages of your life. So it’s important to learn what is normal for your breasts. Breast self-awareness helps you notice any changes in your breasts right away. Report any changes to your healthcare provider.  Why is breast self-awareness important?  Many experts now say that women should focus on breast self-awareness instead of doing a breast self-examination (BSE). These experts include the American Cancer Society, the U.S. Preventive Services Task Force, and the American Congress of Obstetricians and Gynecologists. Some experts even advise not teaching women to do a BSE. That’s because research hasn’t shown a clear benefit to doing BSEs.  Breast self-awareness is different than a BSE. Breast self-awareness isn’t about following a certain method and schedule. It’s about knowing what's normal for  your breasts. That way you can notice even small changes right away. If you see any changes, report them to your healthcare provider.  Changes to look for  Call your healthcare provider if you find any changes in your breasts that concern you. These changes may include:  · A lump  · Nipple discharge other than breastmilk, especially a bloody discharge  · Swelling  · A change in size or shape  · Skin irritation, such as redness, thickening, or dimpling of the skin  · Swollen lymph nodes in the armpit  · Nipple problems, such as pain or redness  If you find a lump  Contact your provider if you find lumpiness in one breast, feel something different in the tissue, or feel a definite lump. Sometimes lumpiness may be due to menstrual changes. But there may be reason for concern.  Your provider may want to see you right away if you have:  · Nipple discharge that is bloody  · Skin changes on your breast, such as dimpling or puckering  It’s normal to be upset if you find a lump. But it’s important to contact your provider right away. Remember that most breast lumps are benign. This means they are not cancer.  Date Last Reviewed: 8/1/2017  © 0554-9178 [employer]. [address] [place] [address], [place] [address]. All rights reserved. This information is not intended as a substitute for professional medical care. Always follow your healthcare professional's instructions.    All nicotine products cause damage to your body, there is not a difference between cigarettes and vaping in this regard, nor chewing tobacco products. If you are using any tobacco products, please work on quitting. [place] has a \"quit line\" you can call for support: 1-800-QUIT-NOW. Periodically they are also able to provide nicotine replacement products to help you quit at no cost. There are also prescription medications and nicotine replacement products that I can prescribe if you would like.     Low risk drinking or moderate alcohol consumption  is defined as 1 drink a day for women and 2 drinks a day for men. Binge drinking is defined as 4 drinks in 2 hours for women and 5 drinks in 2 hours for men.   NIAAA’s (National [place] on Alcohol Abuse and Alcoholism) Definition of Drinking at Low Risk for Developing Alcohol Use Disorder (AUD):  For women, low-risk drinking is defined as no more than 3 drinks on any single day and no more than 7 drinks per week. For men, it is defined as no more than 4 drinks on any single day and no more than 14 drinks per week. NIAAA research shows that only about 2 in 100 people who drink within these limits have AUD (alcohol use disorder). If you are concerned about your alcohol intake please let me know.      Important reminders for our patients:    Please be sure to have any past records sent to us from your previous/outside doctor(s) so your chart is complete. You can sign a release form at our  to have these faxed to our office if they are not available through Epic.     Please bring your medication bottles with you to every office visit - including any over-the-counter medicines, vitamins, and supplements. If you are given any new medications, this will help us make sure that the medicines are safe to use together. If you need any refills in the future, we need to make sure we have the correct medicines and doses in your chart.    All lab appointments must be scheduled, most of the time this can be set up for the same day (exception typically being early morning fasting appointments).   Please plan to have any ordered labs done 2-7 days prior to your next appointment so results are available to review and discuss together (unless otherwise directed). You will be notified prior to your visit only if results are critical, otherwise your labs will be discussed during your visit.     We have a set amount of time together during our visits (15 minutes for most new concerns and some follow up visits, 30 minutes for  physicals and visits where multiple medications/conditions are discussed). In order to make sure we have enough time to address your concerns and come up with a plan for your care, please arrive for your appointments 10-15 minutes early for check in and to meet with your nurse. This will allow every patient to have the most time during their visits and helps keep the schedule \"on-time\".  In some instances you may need to schedule another appointment if there is not time enough for all of your concerns to be addressed appropriately.  We do have reserved spots on the schedule to accommodate this in a timely fashion. It is my sincere intent to follow the schedule, respecting the time of all of my patients, but please understand there are unforeseen circumstances that arise throughout the day requiring more time and that are not appropriate to put off for another day. When I am running behind please know that it is because a patient needs care and there is simply not a way to \"make up\" time throughout the day.     Thank you for allowing us to care for you today! Please complete any survey that is sent as we take feedback seriously and want to be sure we are meeting your needs.  We look forward to seeing you again.     After Hours Care  Nurse triage / On Call Physician [phone]    Scheduling Referrals:  - [facility]al [facility] Central Scheduling  [phone]    - [facility]  (you will get a call to schedule your test)    - [facility]ist Referrals   (you will get a call to schedule your appointment)      Urgent Care Locations  [facility]     [address] [place]  [address] [phone]  >>>>>patients with or without COVID-19 symptoms>>>>>     [facility]  [address]  [place]  [address] [phone]  >>>>>patients without COVID-19 symptoms>>>>>     Both urgent care locations are opened Monday - Friday 8 am - 8 pm and Saturday - Sunday  8 am - 4 pm.    [facility] - Emergency Department  [address] [place]  [address]  [phone]  Open 24 hrs      Coronavirus Disease 2019 (COVID-19): Prevention  The best prevention is to have no contact with the SARS-CoV-2 virus. There is no vaccine yet.  Canceling travel and other outings  Stay informed about COVID-19 in your area. Follow local instructions about being in public. Be aware of events in your community that may be postponed or canceled, such as school and sporting events. You may be advised not to attend public gatherings.   You will be advised to stay at least 6 feet from others as much as possible. This is called \"social distancing.\" You may be advised to stay at home and isolate yourself as much as possible if COVID-19 is in your area. You may hear terms such as \"self isolate, \"quarantine,\" “stay at home,” and “shelter in place.”  The CDC advises that people should not travel to areas where there are COVID-19 outbreaks right now for any reason that is not urgent. For the most current CDC travel advisories, visit the CDC website at www.cdc.gov/coronavirus/2019-ncov/travelers. Don’t go on cruises or do non-essential travel right now.   When you are at home  · Wash your hands often. Use soap and clean, running water for at least 20 seconds.  · If you don't have access to soap and water, use an alcohol-based hand  often. Make sure it has at least 60% alcohol.  · Don't touch your eyes, nose, or mouth unless you have clean hands.  · Don’t kiss someone who is sick.  · If you need to cough or sneeze, do it into a tissue. Then throw the tissue into the trash. If you don't have tissues, cough or sneeze into the bend of your elbow.  · When possible, don't touch \"high-touch\" shared surfaces such as doorknobs and handles, cabinet handles, and light switches.  · Clean frequently-touched home surfaces often with disinfectant. This includes desk surfaces, printers, phones,  kitchen counters, tables, fridge door handle, bathroom surfaces, and any soiled surface. Closely follow disinfectant label instructions. Go to the CDC’s detailed cleaning website at www.cdc.gov/coronavirus/2019-ncov/prepare/cleaning-disinfection.html.  · Check your home supplies. Consider keeping a 2-week supply of medicines, food, and other needed household items.  · Make a plan for childcare, work, and ways to stay in touch with others. Know who will help you if you get sick.  · Don't be around people who are sick.  · There is no evidence right now that animals spread SARS-CoV-2. But it's always a good idea to wash your hands after touching any animals. Don't touch animals that may be sick.  · Don’t share eating or drinking utensils with sick people.    If you leave home    · Stay at least 6 feet away from all people. This is called \"social distancing.\"  · When possible, don't touch \"high-touch\" public surfaces such as doorknobs and handles, cabinet handles, and light switches. If you touch these surfaces, try to clean them first with a disinfecting wipe. Or touch them using a tissue or paper towel.  · Use an alcohol-based hand  often. Make sure it has at least 60% alcohol.  · Don't touch your eyes, nose, or mouth unless you have clean hands.  · If you need to cough or sneeze, do it into a tissue. Then throw the tissue into the trash. If you don't have tissues, cough or sneeze into the bend of your elbow.  · Don't attend public gatherings if possible. If you do attend public gatherings, follow social distancing rules. Don't share food or personal items such as water bottles.  · The CDC advises wearing a cloth face mask in public. During a public health emergency, medical face masks may be reserved for healthcare workers. You may need to make a cloth face mask of your own. You can do this using a bandana, T-shirt, or other cloth. The CDC has instructions on how to make a mask. Wear the mask so that it  covers both your nose and mouth.  · The CDC advises all people over age 2 to wear cloth face masks in public settings when around people outside of their household, especially when it's hard to socially distance. For example, wear a mask in populated places such as public transit, public protests and marches, and crowded stores, bars, and restaurants. Cloth masks may help prevent people who have COVID-19 form spreading the virus to others. Cloth masks are most likely to reduce COVID-19 spread when masks are widely used by people who are out in the public.  · Certain people should not wear a face mask. This includes:  ? Children younger than 2 years old  ? Anyone with a health, developmental, or mental health condition that can be made worse by wearing a mask  ? Anyone who is unconscious or unable to remove the face covering without help. See the CDC's guidance on who should not wear a face mask.     If you are at a work site  · If you feel sick in any way, go home and stay home.  · Tell your supervisor if you are well but live with someone who has COVID-19.  · Stay at least 6 feet away from all people.  · Don't shake hands with anyone.  · Don't attend in-person meetings, or limit how many you attend. Meet over phone or video if possible.  · Don't use other people's desks, phones, equipment, or offices, if possible.  · Wash your hands often. Use soap and clean, running water for at least 20 seconds.  · If you don't have access to soap and water, use an alcohol-based hand  often. Make sure it has at least 60% alcohol.  · Don't touch your eyes, nose, or mouth unless you have clean hands.  · The CDC advises wearing a cloth face mask in public. During a public health emergency, medical face masks may be reserved for healthcare workers. You may need to make a cloth face mask of your own. You can do this using a bandana, T-shirt, or other cloth. The CDC has instructions on how to make a mask. Wear the mask so that  it covers both your nose and mouth. Follow the CDC's advice listed earlier.  · When possible, don't touch \"high-touch\" public surfaces such as doorknobs and handles, cabinet handles, and light switches. If you touch these surfaces, clean them first with a disinfecting wipe. Or touch them using a tissue or paper towel.  · Use office angel one person at a time.  · Consider not having office coffee or tea, or group foods.  · Don’t have meals in groups.  · Clean work surfaces often with disinfectant. This includes desk surfaces, photocopier, printer, phones, kitchen counters, fridge door handle, bathroom surfaces, and others.  · Don’t touch other people’s personal work tools, such as phones, keyboards, pens, and other items.  · Don’t touch other people’s eating or drinking utensils.  · If you need to cough or sneeze, do it into a tissue. Then throw the tissue into the trash. If you don't have tissues, cough or sneeze into the bend of your elbow.    If you have been exposed to a person with COVID-19  If you've been exposed within that last 14 days to someone who is suspected of having COVID-19 or has tested positive for it:  · Call your healthcare provider and follow all instructions. Your activities and where you go likely will be restricted for up to 2 weeks. Check your community's instructions about activity restrictions. You may be directed to stay home, or \"self-isolate.\"  · Take your temperature every morning and evening for at least 14 days. This is to check for fever. Keep a record of the readings.  · Watch for symptoms of the virus. Call your provider if you have symptoms. Call your provider first before going to any clinic or hospital. See the CDC's symptom .  · Stay home if you are sick for any reason.  When to call your healthcare provider  Call your healthcare provider if you think you have COVID-19 symptoms. These can include fever, cough, and trouble breathing. They may also include body aches,  headache, chills or repeated shaking with chills, sore throat, loss of taste or smell, or diarrhea. Don’t go to a healthcare facility before speaking to a healthcare provider.  Last modified date: 6/29/2020  [employer] last reviewed this educational content on 4/1/2020  © 2387-4536 [employer], [employer]. [address] [place] [id number]. All rights reserved. This information is not intended as a substitute for professional medical care. Always follow your healthcare professional's instructions.        +

## 2021-02-17 ENCOUNTER — VIRTUAL VISIT (OUTPATIENT)
Dept: FAMILY MEDICINE CLINIC | Age: 66
End: 2021-02-17
Payer: MEDICARE

## 2021-02-17 DIAGNOSIS — G89.29 CHRONIC PAIN OF LEFT ANKLE: ICD-10-CM

## 2021-02-17 DIAGNOSIS — M25.572 CHRONIC PAIN OF LEFT ANKLE: ICD-10-CM

## 2021-02-17 DIAGNOSIS — M25.512 PAIN OF BOTH SHOULDER JOINTS: Primary | ICD-10-CM

## 2021-02-17 DIAGNOSIS — E66.01 OBESITY, MORBID (HCC): ICD-10-CM

## 2021-02-17 DIAGNOSIS — Z00.00 MEDICARE ANNUAL WELLNESS VISIT, SUBSEQUENT: ICD-10-CM

## 2021-02-17 DIAGNOSIS — M25.511 PAIN OF BOTH SHOULDER JOINTS: Primary | ICD-10-CM

## 2021-02-17 PROCEDURE — G8536 NO DOC ELDER MAL SCRN: HCPCS | Performed by: FAMILY MEDICINE

## 2021-02-17 PROCEDURE — 1101F PT FALLS ASSESS-DOCD LE1/YR: CPT | Performed by: FAMILY MEDICINE

## 2021-02-17 PROCEDURE — 3017F COLORECTAL CA SCREEN DOC REV: CPT | Performed by: FAMILY MEDICINE

## 2021-02-17 PROCEDURE — G0439 PPPS, SUBSEQ VISIT: HCPCS | Performed by: FAMILY MEDICINE

## 2021-02-17 PROCEDURE — G8756 NO BP MEASURE DOC: HCPCS | Performed by: FAMILY MEDICINE

## 2021-02-17 PROCEDURE — G8427 DOCREV CUR MEDS BY ELIG CLIN: HCPCS | Performed by: FAMILY MEDICINE

## 2021-02-17 PROCEDURE — G8510 SCR DEP NEG, NO PLAN REQD: HCPCS | Performed by: FAMILY MEDICINE

## 2021-02-17 PROCEDURE — G0444 DEPRESSION SCREEN ANNUAL: HCPCS | Performed by: FAMILY MEDICINE

## 2021-02-17 PROCEDURE — 99213 OFFICE O/P EST LOW 20 MIN: CPT | Performed by: FAMILY MEDICINE

## 2021-02-17 PROCEDURE — G8417 CALC BMI ABV UP PARAM F/U: HCPCS | Performed by: FAMILY MEDICINE

## 2021-02-17 RX ORDER — DICLOFENAC SODIUM 50 MG/1
50 TABLET, DELAYED RELEASE ORAL 2 TIMES DAILY
Qty: 60 TAB | Refills: 2 | Status: SHIPPED | OUTPATIENT
Start: 2021-02-17

## 2021-02-17 NOTE — PROGRESS NOTES
Patient c/o bilateral shoulder pain says his left is worse than right. He has had prior surgeries on both shoulders. 1. Have you been to the ER, urgent care clinic since your last visit? Hospitalized since your last visit? No  2. Have you seen or consulted any other health care providers outside of the [address] [place] [address] since your last visit? Include any pap smears or colon screening. Has seen his kidney specialists    Medication reconciliation has been completed with patient. Care team discussed/updated as well as pharmacy. Health Maintenance Due   Topic Date Due    COVID-19 Vaccine (1 of 2) 05/24/1971    Shingrix Vaccine Age 50> (1 of 2) 05/24/2005    Lipid Screen  10/04/2019    GLAUCOMA SCREENING Q2Y  05/24/2020    Pneumococcal 65+ years (1 of 1 - PPSV23) 05/24/2020    Flu Vaccine (1) 09/01/2020    Colorectal Cancer Screening Combo  10/07/2020    Medicare Yearly Exam  12/24/2020     Health Maintenance reviewed - MWV today.
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
## 2021-02-17 NOTE — PATIENT INSTRUCTIONS
Medicare Wellness Visit, Male    The best way to live healthy is to have a lifestyle where you eat a well-balanced diet, exercise regularly, limit alcohol use, and quit all forms of tobacco/nicotine, if applicable. Regular preventive services are another way to keep healthy. Preventive services (vaccines, screening tests, monitoring & exams) can help personalize your care plan, which helps you manage your own care. Screening tests can find health problems at the earliest stages, when they are easiest to treat. [facility] follows the current, evidence-based guidelines published by the [facility] Faisal Cardenas ([facility]) when recommending preventive services for our patients. Because we follow these guidelines, sometimes recommendations change over time as research supports it. (For example, a prostate screening blood test is no longer routinely recommended for men with no symptoms). Of course, you and your doctor may decide to screen more often for some diseases, based on your risk and co-morbidities (chronic disease you are already diagnosed with). Preventive services for you include:  - Medicare offers their members a free annual wellness visit, which is time for you and your primary care provider to discuss and plan for your preventive service needs. Take advantage of this benefit every year!  -All adults over age 72 should receive the recommended pneumonia vaccines. Current USPSTF guidelines recommend a series of two vaccines for the best pneumonia protection.   -All adults should have a flu vaccine yearly and tetanus vaccine every 10 years.  -All adults age 48 and older should receive the shingles vaccines (series of two vaccines).        -All adults age 38-68 who are overweight should have a diabetes screening test once every three years.   -Other screening tests & preventive services for persons with diabetes include: an eye exam to screen for diabetic retinopathy, a kidney function test, a foot exam, and stricter control over your cholesterol.   -Cardiovascular screening for adults with routine risk involves an electrocardiogram (ECG) at intervals determined by the provider.   -Colorectal cancer screening should be done for adults age 54-65 with no increased risk factors for colorectal cancer. There are a number of acceptable methods of screening for this type of cancer. Each test has its own benefits and drawbacks. Discuss with your provider what is most appropriate for you during your annual wellness visit. The different tests include: colonoscopy (considered the best screening method), a fecal occult blood test, a fecal DNA test, and sigmoidoscopy.  -All adults born between [facility] should be screened once for Hepatitis C.  -An Abdominal Aortic Aneurysm (AAA) Screening is recommended for men age 73-68 who has ever smoked in their lifetime.      Here is a list of your current Health Maintenance items (your personalized list of preventive services) with a due date:  Health Maintenance Due   Topic Date Due    COVID-19 Vaccine (1 of 2) 05/24/1971    Shingles Vaccine (1 of 2) 05/24/2005    Cholesterol Test   10/04/2019    Glaucoma Screening   05/24/2020    Pneumococcal Vaccine (1 of 1 - PPSV23) 05/24/2020    Yearly Flu Vaccine (1) 09/01/2020    Colorectal Screening  10/07/2020    Annual Well Visit  12/24/2020
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
## 2021-05-20 NOTE — PROGRESS NOTES
Noted The pt is here today to discuss procedure options with Dr. Farhat Guzman. Pt rates pain as 4/10.

## 2021-08-03 PROBLEM — M47.816 LUMBAR SPONDYLOSIS: Status: RESOLVED | Noted: 2017-10-26 | Resolved: 2021-08-03

## 2022-03-18 PROBLEM — G89.29 CHRONIC BILATERAL LOW BACK PAIN WITHOUT SCIATICA: Status: ACTIVE | Noted: 2017-06-29

## 2022-03-18 PROBLEM — M48.061 SPINAL STENOSIS OF LUMBAR REGION WITHOUT NEUROGENIC CLAUDICATION: Status: ACTIVE | Noted: 2017-10-26

## 2022-03-18 PROBLEM — N28.9 RENAL INSUFFICIENCY: Status: ACTIVE | Noted: 2018-11-12

## 2022-03-18 PROBLEM — M54.50 CHRONIC BILATERAL LOW BACK PAIN WITHOUT SCIATICA: Status: ACTIVE | Noted: 2017-06-29

## 2022-03-18 PROBLEM — R97.20 ELEVATED PSA: Status: ACTIVE | Noted: 2019-03-01

## 2022-03-18 PROBLEM — N28.1 RENAL CYST: Status: ACTIVE | Noted: 2019-03-01

## 2022-03-19 PROBLEM — E55.9 VITAMIN D DEFICIENCY: Status: ACTIVE | Noted: 2018-10-18

## 2022-03-19 PROBLEM — L72.9 SKIN CYST: Status: ACTIVE | Noted: 2019-04-04

## 2022-03-19 PROBLEM — G89.4 CHRONIC PAIN SYNDROME: Status: ACTIVE | Noted: 2017-10-26

## 2022-03-19 PROBLEM — M51.37 DEGENERATIVE DISC DISEASE AT L5-S1 LEVEL: Status: ACTIVE | Noted: 2017-10-26

## 2022-03-19 PROBLEM — M48.061 SPINAL STENOSIS OF LUMBAR REGION: Status: ACTIVE | Noted: 2018-10-22

## 2022-03-19 PROBLEM — M47.816 LUMBAR FACET ARTHROPATHY: Status: ACTIVE | Noted: 2017-10-26

## 2022-03-19 PROBLEM — Z71.89 ACP (ADVANCE CARE PLANNING): Status: ACTIVE | Noted: 2017-09-28

## 2022-03-19 PROBLEM — E66.01 OBESITY, MORBID (HCC): Status: ACTIVE | Noted: 2017-12-26

## 2022-03-19 PROBLEM — M51.379 DEGENERATIVE DISC DISEASE AT L5-S1 LEVEL: Status: ACTIVE | Noted: 2017-10-26

## 2022-06-13 ENCOUNTER — OFFICE VISIT (OUTPATIENT)
Dept: FAMILY MEDICINE CLINIC | Age: 67
End: 2022-06-13
Payer: MEDICARE

## 2022-06-13 VITALS
SYSTOLIC BLOOD PRESSURE: 132 MMHG | RESPIRATION RATE: 10 BRPM | TEMPERATURE: 98 F | BODY MASS INDEX: 41.94 KG/M2 | OXYGEN SATURATION: 99 % | WEIGHT: 284 LBS | HEART RATE: 89 BPM | DIASTOLIC BLOOD PRESSURE: 82 MMHG

## 2022-06-13 DIAGNOSIS — M25.512 PAIN OF BOTH SHOULDER JOINTS: Primary | ICD-10-CM

## 2022-06-13 DIAGNOSIS — M25.511 PAIN OF BOTH SHOULDER JOINTS: Primary | ICD-10-CM

## 2022-06-13 DIAGNOSIS — Z00.00 MEDICARE ANNUAL WELLNESS VISIT, SUBSEQUENT: ICD-10-CM

## 2022-06-13 PROCEDURE — G8417 CALC BMI ABV UP PARAM F/U: HCPCS | Performed by: FAMILY MEDICINE

## 2022-06-13 PROCEDURE — 3017F COLORECTAL CA SCREEN DOC REV: CPT | Performed by: FAMILY MEDICINE

## 2022-06-13 PROCEDURE — 1101F PT FALLS ASSESS-DOCD LE1/YR: CPT | Performed by: FAMILY MEDICINE

## 2022-06-13 PROCEDURE — 99213 OFFICE O/P EST LOW 20 MIN: CPT | Performed by: FAMILY MEDICINE

## 2022-06-13 PROCEDURE — G8510 SCR DEP NEG, NO PLAN REQD: HCPCS | Performed by: FAMILY MEDICINE

## 2022-06-13 PROCEDURE — G8752 SYS BP LESS 140: HCPCS | Performed by: FAMILY MEDICINE

## 2022-06-13 PROCEDURE — 1123F ACP DISCUSS/DSCN MKR DOCD: CPT | Performed by: FAMILY MEDICINE

## 2022-06-13 PROCEDURE — G8427 DOCREV CUR MEDS BY ELIG CLIN: HCPCS | Performed by: FAMILY MEDICINE

## 2022-06-13 PROCEDURE — G8754 DIAS BP LESS 90: HCPCS | Performed by: FAMILY MEDICINE

## 2022-06-13 PROCEDURE — G0439 PPPS, SUBSEQ VISIT: HCPCS | Performed by: FAMILY MEDICINE

## 2022-06-13 PROCEDURE — G8536 NO DOC ELDER MAL SCRN: HCPCS | Performed by: FAMILY MEDICINE

## 2022-06-13 RX ORDER — METHYLPREDNISOLONE 4 MG/1
TABLET ORAL
Qty: 1 DOSE PACK | Refills: 0 | Status: SHIPPED | OUTPATIENT
Start: 2022-06-13 | End: 2022-10-05
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
## 2022-06-13 NOTE — PATIENT INSTRUCTIONS
Medicare Wellness Visit, Male    The best way to live healthy is to have a lifestyle where you eat a well-balanced diet, exercise regularly, limit alcohol use, and quit all forms of tobacco/nicotine, if applicable. Regular preventive services are another way to keep healthy. Preventive services (vaccines, screening tests, monitoring & exams) can help personalize your care plan, which helps you manage your own care. Screening tests can find health problems at the earliest stages, when they are easiest to treat. [facility] follows the current, evidence-based guidelines published by the [facility] ([facility]) when recommending preventive services for our patients. Because we follow these guidelines, sometimes recommendations change over time as research supports it. (For example, a prostate screening blood test is no longer routinely recommended for men with no symptoms). Of course, you and your doctor may decide to screen more often for some diseases, based on your risk and co-morbidities (chronic disease you are already diagnosed with). Preventive services for you include:  - Medicare offers their members a free annual wellness visit, which is time for you and your primary care provider to discuss and plan for your preventive service needs. Take advantage of this benefit every year!  -All adults over age 72 should receive the recommended pneumonia vaccines. Current USPSTF guidelines recommend a series of two vaccines for the best pneumonia protection.   -All adults should have a flu vaccine yearly and tetanus vaccine every 10 years.  -All adults age 48 and older should receive the shingles vaccines (series of two vaccines).        -All adults age 38-68 who are overweight should have a diabetes screening test once every three years.   -Other screening tests & preventive services for persons with diabetes include: an eye exam to screen for diabetic retinopathy, a kidney function test, a foot exam, and stricter control over your cholesterol.   -Cardiovascular screening for adults with routine risk involves an electrocardiogram (ECG) at intervals determined by the provider.   -Colorectal cancer screening should be done for adults age 54-65 with no increased risk factors for colorectal cancer. There are a number of acceptable methods of screening for this type of cancer. Each test has its own benefits and drawbacks. Discuss with your provider what is most appropriate for you during your annual wellness visit. The different tests include: colonoscopy (considered the best screening method), a fecal occult blood test, a fecal DNA test, and sigmoidoscopy.  -All adults born between [facility] should be screened once for Hepatitis C.  -An Abdominal Aortic Aneurysm (AAA) Screening is recommended for men age 73-68 who has ever smoked in their lifetime.      Here is a list of your current Health Maintenance items (your personalized list of preventive services) with a due date:  Health Maintenance Due   Topic Date Due    COVID-19 Vaccine (1) Never done    Shingles Vaccine (1 of 2) Never done    Cholesterol Test   10/04/2019    Pneumococcal Vaccine (1 - PCV) Never done    Colorectal Screening  10/07/2020    Depresssion Screening  02/17/2022

## 2022-10-05 ENCOUNTER — OFFICE VISIT (OUTPATIENT)
Dept: FAMILY MEDICINE CLINIC | Age: 67
End: 2022-10-05
Payer: MEDICARE

## 2022-10-05 VITALS
HEART RATE: 95 BPM | RESPIRATION RATE: 16 BRPM | TEMPERATURE: 97.4 F | WEIGHT: 290 LBS | DIASTOLIC BLOOD PRESSURE: 82 MMHG | BODY MASS INDEX: 42.83 KG/M2 | OXYGEN SATURATION: 96 % | SYSTOLIC BLOOD PRESSURE: 136 MMHG

## 2022-10-05 DIAGNOSIS — M19.90 ARTHRITIS: Primary | ICD-10-CM

## 2022-10-05 DIAGNOSIS — M54.2 NECK PAIN: ICD-10-CM

## 2022-10-05 DIAGNOSIS — Z23 ENCOUNTER FOR IMMUNIZATION: ICD-10-CM

## 2022-10-05 DIAGNOSIS — Z12.11 COLON CANCER SCREENING: ICD-10-CM

## 2022-10-05 DIAGNOSIS — L30.9 DERMATITIS: ICD-10-CM

## 2022-10-05 PROCEDURE — G0008 ADMIN INFLUENZA VIRUS VAC: HCPCS | Performed by: FAMILY MEDICINE

## 2022-10-05 PROCEDURE — 1123F ACP DISCUSS/DSCN MKR DOCD: CPT | Performed by: FAMILY MEDICINE

## 2022-10-05 PROCEDURE — G8536 NO DOC ELDER MAL SCRN: HCPCS | Performed by: FAMILY MEDICINE

## 2022-10-05 PROCEDURE — 1101F PT FALLS ASSESS-DOCD LE1/YR: CPT | Performed by: FAMILY MEDICINE

## 2022-10-05 PROCEDURE — G8417 CALC BMI ABV UP PARAM F/U: HCPCS | Performed by: FAMILY MEDICINE

## 2022-10-05 PROCEDURE — G8432 DEP SCR NOT DOC, RNG: HCPCS | Performed by: FAMILY MEDICINE

## 2022-10-05 PROCEDURE — G8754 DIAS BP LESS 90: HCPCS | Performed by: FAMILY MEDICINE

## 2022-10-05 PROCEDURE — 3017F COLORECTAL CA SCREEN DOC REV: CPT | Performed by: FAMILY MEDICINE

## 2022-10-05 PROCEDURE — 90694 VACC AIIV4 NO PRSRV 0.5ML IM: CPT | Performed by: FAMILY MEDICINE

## 2022-10-05 PROCEDURE — G8427 DOCREV CUR MEDS BY ELIG CLIN: HCPCS | Performed by: FAMILY MEDICINE

## 2022-10-05 PROCEDURE — G8752 SYS BP LESS 140: HCPCS | Performed by: FAMILY MEDICINE

## 2022-10-05 PROCEDURE — 99214 OFFICE O/P EST MOD 30 MIN: CPT | Performed by: FAMILY MEDICINE

## 2022-10-05 RX ORDER — PREDNISONE 10 MG/1
TABLET ORAL
Qty: 21 TABLET | Refills: 0 | Status: SHIPPED | OUTPATIENT
Start: 2022-10-05
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
## 2022-10-05 NOTE — PROGRESS NOTES
Chief Complaint   Patient presents with    Neck Pain    Shoulder Pain     Patient here today to be seen for neck and shoulder pain since June that has gotten worse. 1. \"Have you been to the ER, urgent care clinic since your last visit? Hospitalized since your last visit? \" No    2. \"Have you seen or consulted any other health care providers outside of the [address] [place] [address] since your last visit? \" No     3. For patients aged 39-70: Has the patient had a colonoscopy / FIT/ Cologuard? No      If the patient is female:    4. For patients aged 41-77: Has the patient had a mammogram within the past 2 years? NA - based on age or sex      11. For patients aged 21-65: Has the patient had a pap smear?  NA - based on age or sex

## 2022-10-27 ENCOUNTER — OFFICE VISIT (OUTPATIENT)
Dept: ORTHOPEDIC SURGERY | Age: 67
End: 2022-10-27
Payer: MEDICARE

## 2022-10-27 VITALS
HEIGHT: 69 IN | HEART RATE: 192 BPM | OXYGEN SATURATION: 96 % | BODY MASS INDEX: 43.1 KG/M2 | TEMPERATURE: 97.3 F | WEIGHT: 291 LBS

## 2022-10-27 DIAGNOSIS — M75.102 TEAR OF LEFT ROTATOR CUFF, UNSPECIFIED TEAR EXTENT, UNSPECIFIED WHETHER TRAUMATIC: ICD-10-CM

## 2022-10-27 DIAGNOSIS — M75.101 TEAR OF RIGHT ROTATOR CUFF, UNSPECIFIED TEAR EXTENT, UNSPECIFIED WHETHER TRAUMATIC: Primary | ICD-10-CM

## 2022-10-27 DIAGNOSIS — G89.29 CHRONIC PAIN OF BOTH SHOULDERS: ICD-10-CM

## 2022-10-27 DIAGNOSIS — M25.512 CHRONIC PAIN OF BOTH SHOULDERS: ICD-10-CM

## 2022-10-27 DIAGNOSIS — M25.511 CHRONIC PAIN OF BOTH SHOULDERS: ICD-10-CM

## 2022-10-27 PROCEDURE — 1101F PT FALLS ASSESS-DOCD LE1/YR: CPT | Performed by: ORTHOPAEDIC SURGERY

## 2022-10-27 PROCEDURE — G8417 CALC BMI ABV UP PARAM F/U: HCPCS | Performed by: ORTHOPAEDIC SURGERY

## 2022-10-27 PROCEDURE — G8756 NO BP MEASURE DOC: HCPCS | Performed by: ORTHOPAEDIC SURGERY

## 2022-10-27 PROCEDURE — G8427 DOCREV CUR MEDS BY ELIG CLIN: HCPCS | Performed by: ORTHOPAEDIC SURGERY

## 2022-10-27 PROCEDURE — 3017F COLORECTAL CA SCREEN DOC REV: CPT | Performed by: ORTHOPAEDIC SURGERY

## 2022-10-27 PROCEDURE — 73030 X-RAY EXAM OF SHOULDER: CPT | Performed by: ORTHOPAEDIC SURGERY

## 2022-10-27 PROCEDURE — 99203 OFFICE O/P NEW LOW 30 MIN: CPT | Performed by: ORTHOPAEDIC SURGERY

## 2022-10-27 PROCEDURE — G8536 NO DOC ELDER MAL SCRN: HCPCS | Performed by: ORTHOPAEDIC SURGERY

## 2022-10-27 PROCEDURE — G8432 DEP SCR NOT DOC, RNG: HCPCS | Performed by: ORTHOPAEDIC SURGERY

## 2022-10-27 PROCEDURE — 1123F ACP DISCUSS/DSCN MKR DOCD: CPT | Performed by: ORTHOPAEDIC SURGERY
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
## 2022-11-16 ENCOUNTER — TELEPHONE (OUTPATIENT)
Dept: ORTHOPEDIC SURGERY | Age: 67
End: 2022-11-16

## 2022-11-16 NOTE — TELEPHONE ENCOUNTER
Must pick just one shoulder. Insurance will only cover one.  Cancel current order and order for whatever shoulder he chooses

## 2022-11-16 NOTE — TELEPHONE ENCOUNTER
Notes for bilat shoulder MRI auth were faxed to insurance on 11/20 and order is still pending. Please call to provide additional clinical, AIM order ID # [id number], phone # [phone]. Patient is scheduled for 11/18, determination date 11/18.

## 2022-11-16 NOTE — TELEPHONE ENCOUNTER
Attempted to reach patient unable to leave message.  If he calls back please ask patient which side he would like to get done first.

## 2024-01-16 ENCOUNTER — OFFICE VISIT (OUTPATIENT)
Facility: CLINIC | Age: 69
End: 2024-01-16
Payer: MEDICARE

## 2024-01-16 DIAGNOSIS — Z00.00 MEDICARE ANNUAL WELLNESS VISIT, SUBSEQUENT: Primary | ICD-10-CM

## 2024-01-16 DIAGNOSIS — Z12.11 ENCOUNTER FOR SCREENING FOR MALIGNANT NEOPLASM OF COLON: ICD-10-CM

## 2024-01-16 PROCEDURE — 1123F ACP DISCUSS/DSCN MKR DOCD: CPT | Performed by: FAMILY MEDICINE

## 2024-01-16 PROCEDURE — G0439 PPPS, SUBSEQ VISIT: HCPCS | Performed by: FAMILY MEDICINE

## 2024-01-16 RX ORDER — MULTIVIT WITH MINERALS/LUTEIN
1000 TABLET ORAL DAILY
COMMUNITY

## 2024-01-16 SDOH — ECONOMIC STABILITY: FOOD INSECURITY: WITHIN THE PAST 12 MONTHS, THE FOOD YOU BOUGHT JUST DIDN'T LAST AND YOU DIDN'T HAVE MONEY TO GET MORE.: NEVER TRUE

## 2024-01-16 SDOH — ECONOMIC STABILITY: INCOME INSECURITY: HOW HARD IS IT FOR YOU TO PAY FOR THE VERY BASICS LIKE FOOD, HOUSING, MEDICAL CARE, AND HEATING?: NOT VERY HARD

## 2024-01-16 SDOH — ECONOMIC STABILITY: HOUSING INSECURITY
IN THE LAST 12 MONTHS, WAS THERE A TIME WHEN YOU DID NOT HAVE A STEADY PLACE TO SLEEP OR SLEPT IN A SHELTER (INCLUDING NOW)?: NO

## 2024-01-16 SDOH — ECONOMIC STABILITY: FOOD INSECURITY: WITHIN THE PAST 12 MONTHS, YOU WORRIED THAT YOUR FOOD WOULD RUN OUT BEFORE YOU GOT MONEY TO BUY MORE.: NEVER TRUE

## 2024-01-16 ASSESSMENT — PATIENT HEALTH QUESTIONNAIRE - PHQ9
SUM OF ALL RESPONSES TO PHQ9 QUESTIONS 1 & 2: 1
SUM OF ALL RESPONSES TO PHQ QUESTIONS 1-9: 1
1. LITTLE INTEREST OR PLEASURE IN DOING THINGS: 1
SUM OF ALL RESPONSES TO PHQ QUESTIONS 1-9: 1
SUM OF ALL RESPONSES TO PHQ QUESTIONS 1-9: 1
2. FEELING DOWN, DEPRESSED OR HOPELESS: 0
SUM OF ALL RESPONSES TO PHQ QUESTIONS 1-9: 1

## 2024-01-16 ASSESSMENT — LIFESTYLE VARIABLES
HOW MANY STANDARD DRINKS CONTAINING ALCOHOL DO YOU HAVE ON A TYPICAL DAY: PATIENT DOES NOT DRINK
HOW OFTEN DO YOU HAVE A DRINK CONTAINING ALCOHOL: NEVER

## 2024-01-16 NOTE — PATIENT INSTRUCTIONS
reliever, such as acetaminophen (Tylenol).   When should you call for help?   Call 911 if you have symptoms of a heart attack. These may include:    Chest pain or pressure, or a strange feeling in the chest.     Sweating.     Shortness of breath.     Pain, pressure, or a strange feeling in the back, neck, jaw, or upper belly or in one or both shoulders or arms.     Lightheadedness or sudden weakness.     A fast or irregular heartbeat.   After you call 911, the  may tell you to chew 1 adult-strength or 2 to 4 low-dose aspirin. Wait for an ambulance. Do not try to drive yourself.  Watch closely for changes in your health, and be sure to contact your doctor if you have any problems.  Where can you learn more?  Go to https://www.OU[employer].net/patientEd and enter F075 to learn more about \"A Healthy Heart: Care Instructions.\"  Current as of: June 25, 2023               Content Version: 13.9  © 7117-5933 [facility].   Care instructions adapted under license by [employer]. If you have questions about a medical condition or this instruction, always ask your healthcare professional. [facility] disclaims any warranty or liability for your use of this information.      Personalized Preventive Plan for Bienvenido Alford - 1/16/2024  Medicare offers a range of preventive health benefits. Some of the tests and screenings are paid in full while other may be subject to a deductible, co-insurance, and/or copay.    Some of these benefits include a comprehensive review of your medical history including lifestyle, illnesses that may run in your family, and various assessments and screenings as appropriate.    After reviewing your medical record and screening and assessments performed today your provider may have ordered immunizations, labs, imaging, and/or referrals for you.  A list of these orders (if applicable) as well as your Preventive Care list are included within your After Visit Summary for

## 2024-01-16 NOTE — PROGRESS NOTES
Patient presents today for     Chief Complaint   Patient presents with    Medicare AWV    Shoulder Pain     Bilateral, worse on the left- states that this has been an ongoing issue it is just now starting to get worse.     Ankle Pain     Left- [facility]      \"Have you been to the ER, urgent care clinic since your last visit?  Hospitalized since your last visit?\"    NO    “Have you seen or consulted any other health care providers outside of [facility] since your last visit?”    YES - When: approximately 1 months ago.  [facility] and [facility]    “Have you had a colorectal cancer screening such as a colonoscopy/FIT/Cologuard?    NO

## 2024-01-16 NOTE — PROGRESS NOTES
Medicare Annual Wellness Visit    Bienvenido Alford is here for Medicare AWV, Shoulder Pain (Bilateral, worse on the left- states that this has been an ongoing issue it is just now starting to get worse. ), and Ankle Pain (Left- states that [facility]. )    Assessment & Plan   Medicare annual wellness visit, subsequent  Encounter for screening for malignant neoplasm of colon    Recommendations for Preventive Services Due: see orders and patient instructions/AVS.  Recommended screening schedule for the next 5-10 years is provided to the patient in written form: see Patient Instructions/AVS.     No follow-ups on file.     Subjective       Patient's complete Health Risk Assessment and screening values have been reviewed and are found in Flowsheets. The following problems were reviewed today and where indicated follow up appointments were made and/or referrals ordered.    Positive Risk Factor Screenings with Interventions:    Fall Risk:  Do you feel unsteady or are you worried about falling? : (!) yes  2 or more falls in past year?: no  Fall with injury in past year?: no     Interventions:    Reviewed medications, home hazards, visual acuity, and co-morbidities that can increase risk for falls             Activity, Diet, and Weight:  On average, how many days per week do you engage in moderate to strenuous exercise (like a brisk walk)?: 1 day  On average, how many minutes do you engage in exercise at this level?: 20 min    Do you eat balanced/healthy meals regularly?: Yes    There is no height or weight on file to calculate BMI. (!) Abnormal    Obesity Interventions:  Patient declines any further evaluation or treatment            Dentist Screen:  Have you seen the dentist within the past year?: (!) No    Intervention:  Patient declines any further evaluation or treatment     Vision Screen:  Do you have difficulty driving, watching TV, or doing any of your daily activities because of your eyesight?: No  Have

## 2024-01-17 ENCOUNTER — HOSPITAL ENCOUNTER (OUTPATIENT)
Facility: HOSPITAL | Age: 69
Setting detail: SPECIMEN
Discharge: HOME | End: 2024-01-20

## 2024-01-17 LAB — LABCORP SPECIMEN COLLECTION: NORMAL

## 2024-01-18 LAB — HEMOCCULT STL QL IA: NEGATIVE

## 2024-04-25 ENCOUNTER — OFFICE VISIT (OUTPATIENT)
Facility: CLINIC | Age: 69
End: 2024-04-25
Payer: MEDICARE

## 2024-04-25 VITALS
HEART RATE: 88 BPM | SYSTOLIC BLOOD PRESSURE: 134 MMHG | OXYGEN SATURATION: 98 % | HEIGHT: 69 IN | TEMPERATURE: 98.6 F | DIASTOLIC BLOOD PRESSURE: 88 MMHG | WEIGHT: 289.8 LBS | BODY MASS INDEX: 42.92 KG/M2

## 2024-04-25 DIAGNOSIS — J02.9 SORE THROAT: Primary | ICD-10-CM

## 2024-04-25 DIAGNOSIS — R09.81 CONGESTION OF NASAL SINUS: ICD-10-CM

## 2024-04-25 DIAGNOSIS — R05.1 ACUTE COUGH: ICD-10-CM

## 2024-04-25 LAB
EXP DATE SOLUTION: NORMAL
EXP DATE SWAB: NORMAL
EXPIRATION DATE: NORMAL
GROUP A STREP ANTIGEN, POC: NEGATIVE
INFLUENZA A ANTIGEN, POC: NEGATIVE
INFLUENZA B ANTIGEN, POC: NEGATIVE
LOT NUMBER POC: NORMAL
LOT NUMBER SOLUTION: NORMAL
LOT NUMBER SWAB: NORMAL
SARS-COV-2 RNA, POC: NEGATIVE
VALID INTERNAL CONTROL, POC: YES
VALID INTERNAL CONTROL, POC: YES

## 2024-04-25 PROCEDURE — 3075F SYST BP GE 130 - 139MM HG: CPT | Performed by: STUDENT IN AN ORGANIZED HEALTH CARE EDUCATION/TRAINING PROGRAM

## 2024-04-25 PROCEDURE — 1123F ACP DISCUSS/DSCN MKR DOCD: CPT | Performed by: STUDENT IN AN ORGANIZED HEALTH CARE EDUCATION/TRAINING PROGRAM

## 2024-04-25 PROCEDURE — 87804 INFLUENZA ASSAY W/OPTIC: CPT | Performed by: STUDENT IN AN ORGANIZED HEALTH CARE EDUCATION/TRAINING PROGRAM

## 2024-04-25 PROCEDURE — 87635 SARS-COV-2 COVID-19 AMP PRB: CPT | Performed by: STUDENT IN AN ORGANIZED HEALTH CARE EDUCATION/TRAINING PROGRAM

## 2024-04-25 PROCEDURE — 87880 STREP A ASSAY W/OPTIC: CPT | Performed by: STUDENT IN AN ORGANIZED HEALTH CARE EDUCATION/TRAINING PROGRAM

## 2024-04-25 PROCEDURE — 3079F DIAST BP 80-89 MM HG: CPT | Performed by: STUDENT IN AN ORGANIZED HEALTH CARE EDUCATION/TRAINING PROGRAM

## 2024-04-25 PROCEDURE — 99213 OFFICE O/P EST LOW 20 MIN: CPT | Performed by: STUDENT IN AN ORGANIZED HEALTH CARE EDUCATION/TRAINING PROGRAM

## 2024-04-25 NOTE — PROGRESS NOTES
Bienvenido Alford (: 1955) is a 68 y.o. male, established patient, here for evaluation of the following chief complaint(s):  Cough, Congestion, and Pharyngitis          Assessment & Plan  1. Viral infection.  The patient's wheezing is likely a result of chest mucus accumulation, rather than a typical asthma or COPD-type wheezing. The patient has been advised to persist with his current regimen of tea with honey and lemon tea, along with Robitussin, and to ensure adequate hydration. Should the patient's condition not improve or deteriorate by the upcoming Monday, he is advised to contact the clinic for a potential antibiotic prescription.    Results    1. Sore throat  -     AMB POC COVID-19 COV  -     AMB POC RAPID INFLUENZA TEST  -     AMB POC RAPID STREP A  2. Acute cough  -     AMB POC COVID-19 COV  -     AMB POC RAPID INFLUENZA TEST  -     AMB POC RAPID STREP A  3. Congestion of nasal sinus  -     AMB POC COVID-19 COV  -     AMB POC RAPID INFLUENZA TEST  -     AMB POC RAPID STREP A    Return if symptoms worsen or fail to improve.         Subjective   History of Present Illness  The patient presents for evaluation of cough, sore throat, and headache.    The patient began experiencing symptoms on Monday, characterized by a persistent cough, sore throat, and a headache. Although he has a history of headaches, these have progressively worsened on Tuesday and Wednesday, accompanied by wheezing. His wife, who also had a week-long period of illness, recommended an appointment due to the patient's wheezing. The patient has been self-medicating with Robitussin with honey, NyQuil, orange juice, and vitamin C, along with his wife's medication. He has no history of asthma or COPD and does not use an inhaler. He utilizes a CPAP machine for sleep apnea and reports satisfactory supplies. He denies experiencing fevers or chills. His fluid intake has been high, primarily consisting of juice, hot tea, and lemon tea, and

## 2024-04-25 NOTE — PROGRESS NOTES
Chief Complaint   Patient presents with    Cough    Congestion    Pharyngitis     \"Have you been to the ER, urgent care clinic since your last visit?  Hospitalized since your last visit?\"    NO    “Have you seen or consulted any other health care providers outside of [facility] since your last visit?”    NO            Click Here for Release of Records Request

## 2024-07-16 ENCOUNTER — OFFICE VISIT (OUTPATIENT)
Facility: CLINIC | Age: 69
End: 2024-07-16
Payer: MEDICARE

## 2024-07-16 VITALS
WEIGHT: 291.8 LBS | HEART RATE: 76 BPM | BODY MASS INDEX: 43.22 KG/M2 | SYSTOLIC BLOOD PRESSURE: 134 MMHG | DIASTOLIC BLOOD PRESSURE: 84 MMHG | OXYGEN SATURATION: 95 % | HEIGHT: 69 IN

## 2024-07-16 DIAGNOSIS — I10 ESSENTIAL HYPERTENSION: Primary | ICD-10-CM

## 2024-07-16 DIAGNOSIS — Z12.11 COLON CANCER SCREENING: ICD-10-CM

## 2024-07-16 PROCEDURE — 1123F ACP DISCUSS/DSCN MKR DOCD: CPT | Performed by: FAMILY MEDICINE

## 2024-07-16 PROCEDURE — 99213 OFFICE O/P EST LOW 20 MIN: CPT | Performed by: FAMILY MEDICINE

## 2024-07-16 PROCEDURE — 3079F DIAST BP 80-89 MM HG: CPT | Performed by: FAMILY MEDICINE

## 2024-07-16 PROCEDURE — 3075F SYST BP GE 130 - 139MM HG: CPT | Performed by: FAMILY MEDICINE

## 2024-07-16 SDOH — ECONOMIC STABILITY: FOOD INSECURITY: WITHIN THE PAST 12 MONTHS, YOU WORRIED THAT YOUR FOOD WOULD RUN OUT BEFORE YOU GOT MONEY TO BUY MORE.: NEVER TRUE

## 2024-07-16 SDOH — ECONOMIC STABILITY: INCOME INSECURITY: HOW HARD IS IT FOR YOU TO PAY FOR THE VERY BASICS LIKE FOOD, HOUSING, MEDICAL CARE, AND HEATING?: NOT VERY HARD

## 2024-07-16 SDOH — ECONOMIC STABILITY: FOOD INSECURITY: WITHIN THE PAST 12 MONTHS, THE FOOD YOU BOUGHT JUST DIDN'T LAST AND YOU DIDN'T HAVE MONEY TO GET MORE.: NEVER TRUE

## 2024-07-16 ASSESSMENT — ENCOUNTER SYMPTOMS
NAUSEA: 0
COUGH: 0
RESPIRATORY NEGATIVE: 1
VOMITING: 0

## 2024-07-16 ASSESSMENT — PATIENT HEALTH QUESTIONNAIRE - PHQ9
2. FEELING DOWN, DEPRESSED OR HOPELESS: NOT AT ALL
SUM OF ALL RESPONSES TO PHQ QUESTIONS 1-9: 0
1. LITTLE INTEREST OR PLEASURE IN DOING THINGS: NOT AT ALL
SUM OF ALL RESPONSES TO PHQ QUESTIONS 1-9: 0
SUM OF ALL RESPONSES TO PHQ9 QUESTIONS 1 & 2: 0

## 2024-07-16 NOTE — PROGRESS NOTES
Chief Complaint   Patient presents with    Pain     Left side- states that it was be with the lightest touch on his lower left side and he will have pain. States that it has been going on since December.     Headache     Patient states that it is not any better, he is receiving botox every 90 days for this issue.     Follow-up     6 month     \"Have you been to the ER, urgent care clinic since your last visit?  Hospitalized since your last visit?\"    NO    “Have you seen or consulted any other health care providers outside of [facility] since your last visit?”    YES - When: approximately 3 months ago.  Where and Why: Podiatry- ankle pain.            Click Here for Release of Records Request

## 2024-07-16 NOTE — PROGRESS NOTES
Bienvenido Alford is a 69 y.o. male  presents for   Chief Complaint   Patient presents with    Pain     Left side- states that it was be with the lightest touch on his lower left side and he will have pain. States that it has been going on since December.     Headache     Patient states that it is not any better, he is receiving botox every 90 days for this issue.     Follow-up     6 month        No Known Allergies    Current Outpatient Medications:     Ascorbic Acid (VITAMIN C) 1000 MG tablet, Take 1 tablet by mouth daily, Disp: , Rfl:     ZINC PO, Take by mouth, Disp: , Rfl:     amLODIPine (NORVASC) 10 MG tablet, Take 1 tablet by mouth daily, Disp: , Rfl:     atorvastatin (LIPITOR) 40 MG tablet, Take 2 tablets by mouth daily, Disp: , Rfl:     vitamin D 25 MCG (1000 UT) CAPS, Take 1 capsule by mouth daily, Disp: , Rfl:     Cinnamon 500 MG CAPS, Take 1 capsule by mouth daily, Disp: , Rfl:     diclofenac sodium (VOLTAREN) 1 % GEL, Apply topically 4 times daily, Disp: , Rfl:     DULoxetine (CYMBALTA) 60 MG extended release capsule, ceived the following from [facility]: Outside name: DULoxetine (CYMBALTA) 60 mg capsule, Disp: , Rfl:     finasteride (PROSCAR) 5 MG tablet, Take 1 tablet by mouth daily, Disp: , Rfl:     meclizine (ANTIVERT) 25 MG tablet, Take 1 tablet by mouth daily, Disp: , Rfl:     omeprazole (PRILOSEC OTC) 20 MG tablet, Take 1 tablet by mouth daily, Disp: , Rfl:     prazosin (MINIPRESS) 5 MG capsule, Take 2 mg by mouth, Disp: , Rfl:     sildenafil (VIAGRA) 100 MG tablet, Take 1 tablet by mouth as needed, Disp: , Rfl:     traZODone (DESYREL) 150 MG tablet, Take 1 tablet by mouth, Disp: , Rfl:     diclofenac (VOLTAREN) 50 MG EC tablet, Take 1 tablet by mouth 2 times daily (Patient not taking: Reported on 7/16/2024), Disp: , Rfl:    Patient Active Problem List   Diagnosis    Renal cyst    PTSD (post-traumatic stress disorder)    Chronic bilateral low back pain without sciatica    Renal

## 2025-01-16 ENCOUNTER — OFFICE VISIT (OUTPATIENT)
Facility: CLINIC | Age: 70
End: 2025-01-16
Payer: MEDICARE

## 2025-01-16 VITALS
OXYGEN SATURATION: 97 % | BODY MASS INDEX: 51.42 KG/M2 | DIASTOLIC BLOOD PRESSURE: 86 MMHG | WEIGHT: 290.2 LBS | SYSTOLIC BLOOD PRESSURE: 136 MMHG | HEART RATE: 67 BPM | HEIGHT: 63 IN | TEMPERATURE: 97 F

## 2025-01-16 DIAGNOSIS — Z12.11 COLON CANCER SCREENING: ICD-10-CM

## 2025-01-16 DIAGNOSIS — Z00.00 MEDICARE ANNUAL WELLNESS VISIT, SUBSEQUENT: Primary | ICD-10-CM

## 2025-01-16 PROCEDURE — 3079F DIAST BP 80-89 MM HG: CPT | Performed by: FAMILY MEDICINE

## 2025-01-16 PROCEDURE — 3075F SYST BP GE 130 - 139MM HG: CPT | Performed by: FAMILY MEDICINE

## 2025-01-16 PROCEDURE — G0439 PPPS, SUBSEQ VISIT: HCPCS | Performed by: FAMILY MEDICINE

## 2025-01-16 PROCEDURE — 1159F MED LIST DOCD IN RCRD: CPT | Performed by: FAMILY MEDICINE

## 2025-01-16 PROCEDURE — 1123F ACP DISCUSS/DSCN MKR DOCD: CPT | Performed by: FAMILY MEDICINE

## 2025-01-16 PROCEDURE — 1160F RVW MEDS BY RX/DR IN RCRD: CPT | Performed by: FAMILY MEDICINE

## 2025-01-16 SDOH — ECONOMIC STABILITY: FOOD INSECURITY: WITHIN THE PAST 12 MONTHS, THE FOOD YOU BOUGHT JUST DIDN'T LAST AND YOU DIDN'T HAVE MONEY TO GET MORE.: NEVER TRUE

## 2025-01-16 SDOH — ECONOMIC STABILITY: FOOD INSECURITY: WITHIN THE PAST 12 MONTHS, YOU WORRIED THAT YOUR FOOD WOULD RUN OUT BEFORE YOU GOT MONEY TO BUY MORE.: NEVER TRUE

## 2025-01-16 ASSESSMENT — PATIENT HEALTH QUESTIONNAIRE - PHQ9
SUM OF ALL RESPONSES TO PHQ QUESTIONS 1-9: 0
2. FEELING DOWN, DEPRESSED OR HOPELESS: NOT AT ALL
1. LITTLE INTEREST OR PLEASURE IN DOING THINGS: NOT AT ALL
SUM OF ALL RESPONSES TO PHQ9 QUESTIONS 1 & 2: 0

## 2025-01-16 NOTE — PROGRESS NOTES
Medicare Annual Wellness Visit    Bienvenido Alford is here for Hypertension (Patient here for his routine follow up. No concerns. ) and Medicare AWV    Assessment & Plan   Medicare annual wellness visit, subsequent     No follow-ups on file.     Subjective       Patient's complete Health Risk Assessment and screening values have been reviewed and are found in Flowsheets. The following problems were reviewed today and where indicated follow up appointments were made and/or referrals ordered.    Positive Risk Factor Screenings with Interventions:    Fall Risk:  Do you feel unsteady or are you worried about falling? : (!) yes  2 or more falls in past year?: no  Fall with injury in past year?: no     Interventions:    Reviewed medications, home hazards, visual acuity, and co-morbidities that can increase risk for falls             Inactivity:  On average, how many days per week do you engage in moderate to strenuous exercise (like a brisk walk)?: 0 days (!) Abnormal  On average, how many minutes do you engage in exercise at this level?: 0 min  Interventions:  Patient declined any further interventions or treatment     Abnormal BMI (obese):  Body mass index is 51.41 kg/m². (!) Abnormal  Interventions:  Patient declines any further evaluation or treatment             Advanced Directives:  Do you have a Living Will?: (!) No    Intervention:  has NO advanced directive - information provided                     Objective   Vitals:    01/16/25 0843   BP: 136/86   Site: Left Upper Arm   Position: Sitting   Pulse: 67   Temp: 97 °F (36.1 °C)   TempSrc: Tympanic   SpO2: 97%   Weight: 131.6 kg (290 lb 3.2 oz)   Height: 1.6 m (5' 3\")      Body mass index is 51.41 kg/m².        General Appearance: alert and oriented to person, place and time, well developed and well- nourished, in no acute distress  Skin: warm and dry, no rash or erythema  Head: normocephalic and atraumatic  Eyes: pupils equal, round, and reactive to light,

## 2025-01-16 NOTE — PATIENT INSTRUCTIONS
professional. [employer], [facility], disclaims any warranty or liability for your use of this information.    Personalized Preventive Plan for Bienvenido Alford - 1/16/2025  Medicare offers a range of preventive health benefits. Some of the tests and screenings are paid in full while other may be subject to a deductible, co-insurance, and/or copay.  Some of these benefits include a comprehensive review of your medical history including lifestyle, illnesses that may run in your family, and various assessments and screenings as appropriate.  After reviewing your medical record and screening and assessments performed today your provider may have ordered immunizations, labs, imaging, and/or referrals for you.  A list of these orders (if applicable) as well as your Preventive Care list are included within your After Visit Summary for your review.

## 2025-01-16 NOTE — PROGRESS NOTES
Chief Complaint   Patient presents with    Hypertension     Patient here for his routine follow up. No concerns.     Medicare AWV       \"Have you been to the ER, urgent care clinic since your last visit?  Hospitalized since your last visit?\"    NO    “Have you seen or consulted any other health care providers outside our system since your last visit?”    YES - When: approximately 1 months ago.  Where [facility] Why[facility].

## 2025-03-03 LAB — NONINV COLON CA DNA+OCC BLD SCRN STL QL: NEGATIVE

## 2025-07-14 NOTE — PROGRESS NOTES
Have you been to the ER, urgent care clinic since your last visit?  Hospitalized since your last visit?   NO    Have you seen or consulted any other health care providers outside our system since your last visit?   NO      “Have you had a colorectal cancer screening such as a colonoscopy/FIT/Cologuard?    NO    Date of last Colonoscopy: 10/7/2015  Date of last Cologuard: 2/26/2025  Date of last FIT: 1/17/2024   No flexible sigmoidoscopy on file

## 2025-07-16 ENCOUNTER — OFFICE VISIT (OUTPATIENT)
Facility: CLINIC | Age: 70
End: 2025-07-16
Payer: MEDICARE

## 2025-07-16 VITALS
WEIGHT: 283 LBS | SYSTOLIC BLOOD PRESSURE: 112 MMHG | OXYGEN SATURATION: 98 % | BODY MASS INDEX: 50.13 KG/M2 | DIASTOLIC BLOOD PRESSURE: 70 MMHG | HEART RATE: 78 BPM | TEMPERATURE: 97.6 F

## 2025-07-16 DIAGNOSIS — G43.809 OTHER MIGRAINE WITHOUT STATUS MIGRAINOSUS, NOT INTRACTABLE: ICD-10-CM

## 2025-07-16 DIAGNOSIS — I10 ESSENTIAL HYPERTENSION: Primary | ICD-10-CM

## 2025-07-16 PROCEDURE — G8427 DOCREV CUR MEDS BY ELIG CLIN: HCPCS | Performed by: FAMILY MEDICINE

## 2025-07-16 PROCEDURE — 3074F SYST BP LT 130 MM HG: CPT | Performed by: FAMILY MEDICINE

## 2025-07-16 PROCEDURE — 3017F COLORECTAL CA SCREEN DOC REV: CPT | Performed by: FAMILY MEDICINE

## 2025-07-16 PROCEDURE — 1036F TOBACCO NON-USER: CPT | Performed by: FAMILY MEDICINE

## 2025-07-16 PROCEDURE — 99214 OFFICE O/P EST MOD 30 MIN: CPT | Performed by: FAMILY MEDICINE

## 2025-07-16 PROCEDURE — 1123F ACP DISCUSS/DSCN MKR DOCD: CPT | Performed by: FAMILY MEDICINE

## 2025-07-16 PROCEDURE — G8417 CALC BMI ABV UP PARAM F/U: HCPCS | Performed by: FAMILY MEDICINE

## 2025-07-16 PROCEDURE — 3078F DIAST BP <80 MM HG: CPT | Performed by: FAMILY MEDICINE

## 2025-07-16 RX ORDER — SUMATRIPTAN SUCCINATE 100 MG/1
100 TABLET ORAL
Qty: 9 TABLET | Refills: 1 | Status: SHIPPED | OUTPATIENT
Start: 2025-07-16

## 2025-07-16 SDOH — ECONOMIC STABILITY: FOOD INSECURITY: WITHIN THE PAST 12 MONTHS, THE FOOD YOU BOUGHT JUST DIDN'T LAST AND YOU DIDN'T HAVE MONEY TO GET MORE.: NEVER TRUE

## 2025-07-16 SDOH — ECONOMIC STABILITY: FOOD INSECURITY: WITHIN THE PAST 12 MONTHS, YOU WORRIED THAT YOUR FOOD WOULD RUN OUT BEFORE YOU GOT MONEY TO BUY MORE.: NEVER TRUE

## 2025-07-16 ASSESSMENT — ENCOUNTER SYMPTOMS
COUGH: 0
VOMITING: 0
NAUSEA: 0
RESPIRATORY NEGATIVE: 1

## 2025-07-16 ASSESSMENT — PATIENT HEALTH QUESTIONNAIRE - PHQ9
2. FEELING DOWN, DEPRESSED OR HOPELESS: NOT AT ALL
SUM OF ALL RESPONSES TO PHQ QUESTIONS 1-9: 0
1. LITTLE INTEREST OR PLEASURE IN DOING THINGS: NOT AT ALL
SUM OF ALL RESPONSES TO PHQ QUESTIONS 1-9: 0

## 2025-07-16 NOTE — PROGRESS NOTES
Bienvenido Alford is a 70 y.o. male  presents for   Chief Complaint   Patient presents with    Follow-up     6 mo f/u       Hypertension    Headache        No Known Allergies    Current Outpatient Medications:     Ascorbic Acid (VITAMIN C) 1000 MG tablet, Take 1 tablet by mouth daily, Disp: , Rfl:     ZINC PO, Take by mouth, Disp: , Rfl:     amLODIPine (NORVASC) 10 MG tablet, Take 1 tablet by mouth daily, Disp: , Rfl:     atorvastatin (LIPITOR) 40 MG tablet, Take 2 tablets by mouth daily, Disp: , Rfl:     vitamin D 25 MCG (1000 UT) CAPS, Take 1 capsule by mouth daily, Disp: , Rfl:     Cinnamon 500 MG CAPS, Take 1 capsule by mouth daily, Disp: , Rfl:     diclofenac sodium (VOLTAREN) 1 % GEL, Apply topically 4 times daily, Disp: , Rfl:     DULoxetine (CYMBALTA) 60 MG extended release capsule, ceived the following from [facility]: Outside name: DULoxetine (CYMBALTA) 60 mg capsule, Disp: , Rfl:     finasteride (PROSCAR) 5 MG tablet, Take 1 tablet by mouth daily, Disp: , Rfl:     meclizine (ANTIVERT) 25 MG tablet, Take 1 tablet by mouth daily, Disp: , Rfl:     omeprazole (PRILOSEC OTC) 20 MG tablet, Take 1 tablet by mouth daily, Disp: , Rfl:     prazosin (MINIPRESS) 5 MG capsule, Take 2 mg by mouth, Disp: , Rfl:     sildenafil (VIAGRA) 100 MG tablet, Take 1 tablet by mouth as needed, Disp: , Rfl:     traZODone (DESYREL) 150 MG tablet, Take 1 tablet by mouth, Disp: , Rfl:    Patient Active Problem List   Diagnosis    Renal cyst    PTSD (post-traumatic stress disorder)    Chronic bilateral low back pain without sciatica    Renal insufficiency    Spinal stenosis of lumbar region without neurogenic claudication    Elevated PSA    ACP (advance care planning)    Lumbar facet arthropathy    Chronic pain syndrome    Skin cyst    Gastroesophageal reflux disease without esophagitis    Vitamin D deficiency    Obesity, morbid (HCC)    Essential hypertension    Benign prostatic hyperplasia without lower urinary tract

## (undated) DEVICE — Device: Brand: MEDEX

## (undated) DEVICE — DRAPE TWL SURG 16X26IN BLU ORB04] ALLCARE INC]

## (undated) DEVICE — TRAY SUPP STD NO DRUG W EXTENSION SET

## (undated) DEVICE — KIT CLN UP BON SECOURS MARYV

## (undated) DEVICE — KENDALL SCD EXPRESS SLEEVES, KNEE LENGTH, MEDIUM: Brand: KENDALL SCD

## (undated) DEVICE — ELECTRODE ES AD DISPER HYDRGEL THN FOAM ADH SCALLOPED EDGE

## (undated) DEVICE — (D)BNDG ADHESIVE FABRIC 3/4X3 -- DISC BY MFR USE ITEM 357960

## (undated) DEVICE — 3.0MM PRECISION NEURO (MATCH HEAD)

## (undated) DEVICE — 3M™ BAIR PAWS FLEX™ WARMING GOWN, STANDARD, 20 PER CASE 81003: Brand: BAIR PAWS™

## (undated) DEVICE — DRAPE,REIN 53X77,STERILE: Brand: MEDLINE

## (undated) DEVICE — (D)PREP SKN CHLRAPRP APPL 26ML -- CONVERT TO ITEM 371833

## (undated) DEVICE — AGGRESSIVE PLUS, ANGLED CUTTER: Brand: FORMULA

## (undated) DEVICE — GEL BAG FOR WRAPS --

## (undated) DEVICE — MEDIA CONTRAST 10ML 200MG/ML 41%

## (undated) DEVICE — ARTHROSCOPIC KNEE HOLDER: Brand: DEVON

## (undated) DEVICE — [ARTHROSCOPY PUMP,  DO NOT USE IF PACKAGE IS DAMAGED,  KEEP DRY,  KEEP AWAY FROM SUNLIGHT,  PROTECT FROM HEAT AND RADIOACTIVE SOURCES.]: Brand: FLOSTEADY

## (undated) DEVICE — STERILE POLYISOPRENE POWDER-FREE SURGICAL GLOVES: Brand: PROTEXIS

## (undated) DEVICE — WATERPROOF, BACTERIA PROOF DRESSING WITH ABSORBENT SEE THROUGH PAD: Brand: OPSITE POST-OP VISIBLE 10X8CM CTN 20

## (undated) DEVICE — REM POLYHESIVE ADULT PATIENT RETURN ELECTRODE: Brand: VALLEYLAB

## (undated) DEVICE — DYONICS 25 INFLOW TUBE SET, 3 PER BOX

## (undated) DEVICE — CURVED SHARP RF CANNULA, RADIOPAQUE MARKER: Brand: RADIOPAQUE RADIOFREQUENCY CANNULA

## (undated) DEVICE — AVANOS* SHORT BEVEL NEEDLE: Brand: AVANOS

## (undated) DEVICE — MIRAGE SWIFT II PILLOW LGE: Brand: MIRAGE SWIFT II

## (undated) DEVICE — CUFF BLD PRESSURE MONITORING LNG AD 23-33 CM 1 TUBE MY CUF

## (undated) DEVICE — INTENDED FOR TISSUE SEPARATION, AND OTHER PROCEDURES THAT REQUIRE A SHARP SURGICAL BLADE TO PUNCTURE OR CUT.: Brand: BARD-PARKER SAFETY BLADES SIZE 11, STERILE

## (undated) DEVICE — INTENDED FOR TISSUE SEPARATION, AND OTHER PROCEDURES THAT REQUIRE A SHARP SURGICAL BLADE TO PUNCTURE OR CUT.: Brand: BARD-PARKER SAFETY BLADES SIZE 15, STERILE

## (undated) DEVICE — SUTURE VCRL SZ 1 L18IN ABSRB VLT CT-1 L36MM 1/2 CIR J741D

## (undated) DEVICE — STOCKING COMPR L L31-34IN LNG 19MMHG ANK 9-10IN CALF

## (undated) DEVICE — STOCKING COMPR L L29-31IN REG 19MMHG ANK 9-10IN CALF

## (undated) DEVICE — WAX SURG 2.5GM HEMSTAT BNE BEESWAX PARAFFIN ISO PALMITATE

## (undated) DEVICE — INTENDED FOR TISSUE SEPARATION, AND OTHER PROCEDURES THAT REQUIRE A SHARP SURGICAL BLADE TO PUNCTURE OR CUT.: Brand: BARD-PARKER SAFETY BLADES SIZE 20, STERILE

## (undated) DEVICE — KIT POS W/ FOAM ARM CRADL SHEARGUARD CHST PD CVR FOR SPNL

## (undated) DEVICE — SYR 10ML LUER LOK 1/5ML GRAD --

## (undated) DEVICE — TRAY MYEL SFTY +

## (undated) DEVICE — SOFT SILICONE HYDROCELLULAR SACRUM DRESSING WITH LOCK AWAY LAYER: Brand: ALLEVYN LIFE SACRUM (LARGE) PACK OF 10

## (undated) DEVICE — Device

## (undated) DEVICE — WATERPROOF, BACTERIA PROOF DRESSING WITH ABSORBENT SEE THROUGH PAD: Brand: OPSITE POST-OP VISIBLE 15X10CM CTN 20

## (undated) DEVICE — KNEE ARTHROSCOPY III-LF: Brand: MEDLINE INDUSTRIES, INC.

## (undated) DEVICE — SUTURE VCRL SZ 2-0 L18IN ABSRB VLT CT-1 L36MM 1/2 CIR J739D

## (undated) DEVICE — SET EPI 18GA L3.5IN TUOHY NDL W/ 20GA CLS TIP NYL CATH

## (undated) DEVICE — SUT ETHLN 3-0 18IN PS1 BLK --

## (undated) DEVICE — SOLUTION IRRIG 3000ML 0.9% SOD CHL FLX CONT 0797208] ICU MEDICAL INC]

## (undated) DEVICE — SUTURE MCRYL SZ 3-0 L27IN ABSRB UD L24MM PS-1 3/8 CIR PRIM Y936H

## (undated) DEVICE — FLEX ADVANTAGE 3000CC: Brand: FLEX ADVANTAGE

## (undated) DEVICE — SOLUTION IV 1000ML 0.9% SOD CHL

## (undated) DEVICE — WRAP COMPR BK

## (undated) DEVICE — (D)GLOVE SURG TRIFLX 7.5 PWD L -- DISC BY MFR USE ITEM 302993

## (undated) DEVICE — NEEDLE HYPO 22GA L1.5IN BLK S STL HUB POLYPR SHLD REG BVL